# Patient Record
Sex: FEMALE | Race: WHITE
[De-identification: names, ages, dates, MRNs, and addresses within clinical notes are randomized per-mention and may not be internally consistent; named-entity substitution may affect disease eponyms.]

---

## 2018-02-19 ENCOUNTER — HOSPITAL ENCOUNTER (OUTPATIENT)
Age: 68
End: 2018-02-19
Payer: MEDICARE

## 2018-02-19 DIAGNOSIS — R91.8: Primary | ICD-10-CM

## 2018-02-19 DIAGNOSIS — Z87.891: ICD-10-CM

## 2018-02-19 DIAGNOSIS — C50.911: ICD-10-CM

## 2018-02-19 PROCEDURE — 77067 SCR MAMMO BI INCL CAD: CPT

## 2018-02-19 PROCEDURE — 77080 DXA BONE DENSITY AXIAL: CPT

## 2018-06-05 ENCOUNTER — HOSPITAL ENCOUNTER (OUTPATIENT)
Age: 68
End: 2018-06-05
Payer: MEDICARE

## 2018-06-05 DIAGNOSIS — F17.200: ICD-10-CM

## 2018-06-05 DIAGNOSIS — Z78.0: ICD-10-CM

## 2018-06-05 DIAGNOSIS — C50.911: Primary | ICD-10-CM

## 2018-06-05 DIAGNOSIS — R91.8: ICD-10-CM

## 2018-06-05 PROCEDURE — 71250 CT THORAX DX C-: CPT

## 2018-06-06 ENCOUNTER — HOSPITAL ENCOUNTER (OUTPATIENT)
Age: 68
End: 2018-06-06
Payer: MEDICARE

## 2018-06-06 DIAGNOSIS — Z79.810: ICD-10-CM

## 2018-06-06 DIAGNOSIS — Z51.81: ICD-10-CM

## 2018-06-06 DIAGNOSIS — R29.898: ICD-10-CM

## 2018-06-06 DIAGNOSIS — C50.911: Primary | ICD-10-CM

## 2018-06-06 DIAGNOSIS — M81.0: ICD-10-CM

## 2018-06-06 LAB
ALBUMIN LEVEL: 3.5 GM/DL (ref 3.4–5)
ALBUMIN/GLOB SERPL: 1.3 {RATIO} (ref 1.1–1.8)
ALP ISO SERPL-ACNC: 52 U/L (ref 46–116)
ALT SERPLBLD-CCNC: 31 U/L (ref 12–78)
ANION GAP SERPL CALC-SCNC: 11.1 MEQ/L (ref 5–15)
AST SERPL QL: 27 U/L (ref 15–37)
BILIRUBIN,TOTAL: 0.2 MG/DL (ref 0.2–1)
BUN SERPL-MCNC: 23 MG/DL (ref 7–18)
CALCIUM SPEC-MCNC: 8.5 MG/DL (ref 8.5–10.1)
CHLORIDE SPEC-SCNC: 105 MMOL/L (ref 98–107)
CO2 SERPL-SCNC: 31 MMOL/L (ref 21–32)
CREAT BLD-SCNC: 0.91 MG/DL (ref 0.55–1.02)
ESTIMATED GLOMERULAR FILT RATE: 62 ML/MIN (ref 60–?)
GFR (AFRICAN AMERICAN): 75 ML/MIN (ref 60–?)
GLOBULIN SER CALC-MCNC: 2.8 GM/DL (ref 1.3–3.2)
GLUCOSE: 92 MG/DL (ref 74–106)
HCT VFR BLD CALC: 38 % (ref 37–47)
HGB BLD-MCNC: 11.2 G/DL (ref 12.2–16.2)
MCHC RBC-ENTMCNC: 29.5 G/DL (ref 31.8–35.4)
MCV RBC: 103.7 FL (ref 81–99)
MEAN CORPUSCULAR HEMOGLOBIN: 30.6 PG (ref 27–31.2)
PLATELET # BLD: 223 K/MM3 (ref 142–424)
POTASSIUM: 4.1 MMOL/L (ref 3.5–5.1)
PROT SERPL-MCNC: 6.3 GM/DL (ref 6.4–8.2)
RBC # BLD AUTO: 3.66 M/MM3 (ref 4.2–5.4)
SODIUM SPEC-SCNC: 143 MMOL/L (ref 136–145)
WBC # BLD AUTO: 8.4 K/MM3 (ref 4.8–10.8)

## 2018-06-06 PROCEDURE — 80053 COMPREHEN METABOLIC PANEL: CPT

## 2018-06-06 PROCEDURE — 36415 COLL VENOUS BLD VENIPUNCTURE: CPT

## 2018-06-06 PROCEDURE — 85025 COMPLETE CBC W/AUTO DIFF WBC: CPT

## 2018-07-17 ENCOUNTER — HOSPITAL ENCOUNTER (OUTPATIENT)
Dept: HOSPITAL 22 - PT | Age: 68
Discharge: HOME | End: 2018-07-17
Payer: MEDICARE

## 2018-07-17 DIAGNOSIS — I89.0: Primary | ICD-10-CM

## 2018-07-17 PROCEDURE — 97162 PT EVAL MOD COMPLEX 30 MIN: CPT

## 2018-07-17 PROCEDURE — 97140 MANUAL THERAPY 1/> REGIONS: CPT

## 2018-07-17 PROCEDURE — 97760 ORTHOTIC MGMT&TRAING 1ST ENC: CPT

## 2018-10-29 ENCOUNTER — HOSPITAL ENCOUNTER (OUTPATIENT)
Age: 68
End: 2018-10-29
Payer: MEDICARE

## 2018-10-29 DIAGNOSIS — N89.8: ICD-10-CM

## 2018-10-29 DIAGNOSIS — R09.89: Primary | ICD-10-CM

## 2018-10-29 PROCEDURE — 76830 TRANSVAGINAL US NON-OB: CPT

## 2018-10-29 PROCEDURE — 93880 EXTRACRANIAL BILAT STUDY: CPT

## 2020-02-22 ENCOUNTER — HOSPITAL ENCOUNTER (OUTPATIENT)
Age: 70
End: 2020-02-22
Payer: MEDICARE

## 2020-02-22 DIAGNOSIS — D50.9: Primary | ICD-10-CM

## 2020-02-22 DIAGNOSIS — C50.111: ICD-10-CM

## 2020-02-22 PROCEDURE — 71046 X-RAY EXAM CHEST 2 VIEWS: CPT

## 2020-02-22 PROCEDURE — 36415 COLL VENOUS BLD VENIPUNCTURE: CPT

## 2020-02-22 PROCEDURE — 86850 RBC ANTIBODY SCREEN: CPT

## 2020-02-24 ENCOUNTER — HOSPITAL ENCOUNTER (OUTPATIENT)
Dept: HOSPITAL 22 - INF | Age: 70
Discharge: HOME | End: 2020-02-24
Payer: MEDICARE

## 2020-02-24 VITALS
TEMPERATURE: 98.24 F | SYSTOLIC BLOOD PRESSURE: 117 MMHG | DIASTOLIC BLOOD PRESSURE: 54 MMHG | RESPIRATION RATE: 20 BRPM | HEART RATE: 72 BPM | OXYGEN SATURATION: 97 %

## 2020-02-24 VITALS
HEART RATE: 71 BPM | DIASTOLIC BLOOD PRESSURE: 59 MMHG | RESPIRATION RATE: 18 BRPM | SYSTOLIC BLOOD PRESSURE: 110 MMHG | TEMPERATURE: 98.4 F | OXYGEN SATURATION: 96 %

## 2020-02-24 VITALS
TEMPERATURE: 98.3 F | RESPIRATION RATE: 16 BRPM | SYSTOLIC BLOOD PRESSURE: 114 MMHG | DIASTOLIC BLOOD PRESSURE: 52 MMHG | OXYGEN SATURATION: 97 % | HEART RATE: 72 BPM

## 2020-02-24 VITALS
SYSTOLIC BLOOD PRESSURE: 106 MMHG | RESPIRATION RATE: 18 BRPM | DIASTOLIC BLOOD PRESSURE: 54 MMHG | HEART RATE: 72 BPM | OXYGEN SATURATION: 97 % | TEMPERATURE: 98.24 F

## 2020-02-24 VITALS
SYSTOLIC BLOOD PRESSURE: 114 MMHG | RESPIRATION RATE: 18 BRPM | TEMPERATURE: 98.7 F | HEART RATE: 74 BPM | OXYGEN SATURATION: 94 % | DIASTOLIC BLOOD PRESSURE: 58 MMHG

## 2020-02-24 VITALS
RESPIRATION RATE: 20 BRPM | OXYGEN SATURATION: 94 % | TEMPERATURE: 98.24 F | HEART RATE: 78 BPM | DIASTOLIC BLOOD PRESSURE: 56 MMHG | SYSTOLIC BLOOD PRESSURE: 122 MMHG

## 2020-02-24 VITALS
HEART RATE: 76 BPM | TEMPERATURE: 98.4 F | DIASTOLIC BLOOD PRESSURE: 59 MMHG | SYSTOLIC BLOOD PRESSURE: 114 MMHG | OXYGEN SATURATION: 96 % | RESPIRATION RATE: 18 BRPM

## 2020-02-24 VITALS
DIASTOLIC BLOOD PRESSURE: 73 MMHG | OXYGEN SATURATION: 94 % | RESPIRATION RATE: 20 BRPM | HEART RATE: 75 BPM | SYSTOLIC BLOOD PRESSURE: 137 MMHG | TEMPERATURE: 98.9 F

## 2020-02-24 VITALS
DIASTOLIC BLOOD PRESSURE: 61 MMHG | HEART RATE: 71 BPM | RESPIRATION RATE: 18 BRPM | OXYGEN SATURATION: 99 % | SYSTOLIC BLOOD PRESSURE: 114 MMHG | TEMPERATURE: 98.4 F

## 2020-02-24 VITALS
HEART RATE: 75 BPM | OXYGEN SATURATION: 97 % | TEMPERATURE: 98.3 F | DIASTOLIC BLOOD PRESSURE: 52 MMHG | SYSTOLIC BLOOD PRESSURE: 111 MMHG | RESPIRATION RATE: 20 BRPM

## 2020-02-24 VITALS
HEART RATE: 72 BPM | DIASTOLIC BLOOD PRESSURE: 56 MMHG | OXYGEN SATURATION: 98 % | TEMPERATURE: 98.6 F | SYSTOLIC BLOOD PRESSURE: 109 MMHG | RESPIRATION RATE: 18 BRPM

## 2020-02-24 VITALS
RESPIRATION RATE: 16 BRPM | DIASTOLIC BLOOD PRESSURE: 50 MMHG | HEART RATE: 71 BPM | TEMPERATURE: 98.24 F | SYSTOLIC BLOOD PRESSURE: 118 MMHG | OXYGEN SATURATION: 98 %

## 2020-02-24 VITALS
SYSTOLIC BLOOD PRESSURE: 126 MMHG | RESPIRATION RATE: 18 BRPM | DIASTOLIC BLOOD PRESSURE: 53 MMHG | OXYGEN SATURATION: 94 % | HEART RATE: 73 BPM | TEMPERATURE: 98.6 F

## 2020-02-24 VITALS
RESPIRATION RATE: 18 BRPM | SYSTOLIC BLOOD PRESSURE: 112 MMHG | TEMPERATURE: 98.42 F | DIASTOLIC BLOOD PRESSURE: 51 MMHG | OXYGEN SATURATION: 98 % | HEART RATE: 71 BPM

## 2020-02-24 VITALS
TEMPERATURE: 98.5 F | SYSTOLIC BLOOD PRESSURE: 123 MMHG | HEART RATE: 77 BPM | DIASTOLIC BLOOD PRESSURE: 63 MMHG | RESPIRATION RATE: 18 BRPM | OXYGEN SATURATION: 93 %

## 2020-02-24 VITALS
SYSTOLIC BLOOD PRESSURE: 125 MMHG | TEMPERATURE: 98.24 F | DIASTOLIC BLOOD PRESSURE: 62 MMHG | HEART RATE: 73 BPM | OXYGEN SATURATION: 94 % | RESPIRATION RATE: 16 BRPM

## 2020-02-24 VITALS
RESPIRATION RATE: 16 BRPM | HEART RATE: 74 BPM | OXYGEN SATURATION: 94 % | SYSTOLIC BLOOD PRESSURE: 138 MMHG | TEMPERATURE: 98.3 F | DIASTOLIC BLOOD PRESSURE: 64 MMHG

## 2020-02-24 VITALS
HEART RATE: 73 BPM | RESPIRATION RATE: 18 BRPM | DIASTOLIC BLOOD PRESSURE: 60 MMHG | OXYGEN SATURATION: 97 % | SYSTOLIC BLOOD PRESSURE: 115 MMHG | TEMPERATURE: 98.24 F

## 2020-02-24 VITALS
TEMPERATURE: 98.24 F | RESPIRATION RATE: 20 BRPM | HEART RATE: 73 BPM | OXYGEN SATURATION: 97 % | SYSTOLIC BLOOD PRESSURE: 115 MMHG | DIASTOLIC BLOOD PRESSURE: 50 MMHG

## 2020-02-24 VITALS — BODY MASS INDEX: 21.2 KG/M2

## 2020-02-24 VITALS
RESPIRATION RATE: 18 BRPM | DIASTOLIC BLOOD PRESSURE: 59 MMHG | OXYGEN SATURATION: 98 % | HEART RATE: 72 BPM | TEMPERATURE: 98.4 F | SYSTOLIC BLOOD PRESSURE: 104 MMHG

## 2020-02-24 VITALS
RESPIRATION RATE: 16 BRPM | HEART RATE: 75 BPM | SYSTOLIC BLOOD PRESSURE: 105 MMHG | DIASTOLIC BLOOD PRESSURE: 51 MMHG | OXYGEN SATURATION: 94 % | TEMPERATURE: 98.5 F

## 2020-02-24 DIAGNOSIS — D50.8: Primary | ICD-10-CM

## 2020-02-24 LAB
HCT VFR BLD CALC: 29.9 % (ref 37–47)
HCT VFR BLD CALC: 35.9 % (ref 37–47)
HGB BLD-MCNC: 10.7 G/DL (ref 12.2–16.2)
HGB BLD-MCNC: 8.3 G/DL (ref 12.2–16.2)
MCHC RBC-ENTMCNC: 27.6 G/DL (ref 31.8–35.4)
MCV RBC: 90.9 FL (ref 81–99)
MEAN CORPUSCULAR HEMOGLOBIN: 25.1 PG (ref 27–31.2)
PLATELET # BLD: 440 K/MM3 (ref 142–424)
RBC # BLD AUTO: 3.29 M/MM3 (ref 4.2–5.4)
WBC # BLD AUTO: 7 K/MM3 (ref 4.8–10.8)

## 2020-02-24 PROCEDURE — 85014 HEMATOCRIT: CPT

## 2020-02-24 PROCEDURE — 85025 COMPLETE CBC W/AUTO DIFF WBC: CPT

## 2020-02-24 PROCEDURE — P9016 RBC LEUKOCYTES REDUCED: HCPCS

## 2020-02-24 PROCEDURE — 36430 TRANSFUSION BLD/BLD COMPNT: CPT

## 2020-02-24 PROCEDURE — 85018 HEMOGLOBIN: CPT

## 2020-02-24 PROCEDURE — 36415 COLL VENOUS BLD VENIPUNCTURE: CPT

## 2020-02-24 NOTE — PC.NURSE
1600  1st unit of blood complete. Pt has tolerated transfusion of 1st unit of blood well with no problems/no s/s reaction noted. VSS. Resp unlabored. O2 remains in use at 2lpm per NC, as pt is home O2 dependent at 2lpm per NC. Pt denies complaints

## 2020-02-24 NOTE — PC.NURSE
1345  1st unit of PRBC's initiated at this time. VSS. Lungs diminished throughout, pt with hx of severe COPD. O2 in use at 2lpm per nc consistent with pt baseline home O2 dependency. IV patent. Sister at bedside. Resp unlabored. Skin warm/dry to

## 2020-02-24 NOTE — PC.NURSE
1720  Pt tolerating 2nd unit of blood well. Denies c/o. VSS. Lungs consistent with baseline pre-transfusion assessment. Resp unlabored. ORe2 remains in use at 2lpm per nc.  Pt transported via wheelchair per [facility] and [facility] to room 210

## 2020-09-03 ENCOUNTER — TRANSCRIBE ORDERS (OUTPATIENT)
Dept: ADMINISTRATIVE | Facility: HOSPITAL | Age: 70
End: 2020-09-03

## 2020-09-03 DIAGNOSIS — Z12.31 VISIT FOR SCREENING MAMMOGRAM: Primary | ICD-10-CM

## 2020-10-05 ENCOUNTER — APPOINTMENT (OUTPATIENT)
Dept: OTHER | Facility: HOSPITAL | Age: 70
End: 2020-10-05

## 2020-10-05 ENCOUNTER — HOSPITAL ENCOUNTER (OUTPATIENT)
Dept: MAMMOGRAPHY | Facility: HOSPITAL | Age: 70
Discharge: HOME | End: 2020-10-05
Admitting: NURSE PRACTITIONER

## 2020-10-05 DIAGNOSIS — Z12.31 VISIT FOR SCREENING MAMMOGRAM: ICD-10-CM

## 2020-10-05 PROCEDURE — 77067 SCR MAMMO BI INCL CAD: CPT

## 2020-10-05 PROCEDURE — 77063 BREAST TOMOSYNTHESIS BI: CPT | Performed by: RADIOLOGY

## 2020-10-05 PROCEDURE — 77063 BREAST TOMOSYNTHESIS BI: CPT

## 2020-10-05 PROCEDURE — 77067 SCR MAMMO BI INCL CAD: CPT | Performed by: RADIOLOGY

## 2021-12-21 ENCOUNTER — TELEPHONE (OUTPATIENT)
Dept: ONCOLOGY | Facility: CLINIC | Age: 71
End: 2021-12-21

## 2021-12-21 NOTE — TELEPHONE ENCOUNTER
Caller: Sharmila Delarosa    Relationship to patient: Self    Best call back number: [phone]    Type of visit: NEW PATIENT    Requested date: PLEASE CALL TO R/S.    If rescheduling, when is the original appointment: 12/21    Additional notes: HUB UNABLE TO REACH WARM TRANSFER.

## 2022-01-01 ENCOUNTER — TRANSCRIBE ORDERS (OUTPATIENT)
Dept: ADMINISTRATIVE | Facility: HOSPITAL | Age: 72
End: 2022-01-01

## 2022-01-01 ENCOUNTER — TELEPHONE (OUTPATIENT)
Dept: ONCOLOGY | Facility: CLINIC | Age: 72
End: 2022-01-01

## 2022-01-01 ENCOUNTER — OFFICE VISIT (OUTPATIENT)
Dept: PULMONOLOGY | Facility: CLINIC | Age: 72
End: 2022-01-01

## 2022-01-01 ENCOUNTER — LAB (OUTPATIENT)
Dept: LAB | Facility: HOSPITAL | Age: 72
End: 2022-01-01

## 2022-01-01 ENCOUNTER — CONSULT (OUTPATIENT)
Dept: ONCOLOGY | Facility: CLINIC | Age: 72
End: 2022-01-01

## 2022-01-01 ENCOUNTER — HOSPITAL ENCOUNTER (OUTPATIENT)
Dept: MAMMOGRAPHY | Facility: HOSPITAL | Age: 72
Discharge: HOME | End: 2022-07-26
Admitting: FAMILY MEDICINE

## 2022-01-01 VITALS
TEMPERATURE: 97.1 F | OXYGEN SATURATION: 100 % | WEIGHT: 130.8 LBS | BODY MASS INDEX: 21.02 KG/M2 | DIASTOLIC BLOOD PRESSURE: 90 MMHG | HEART RATE: 84 BPM | SYSTOLIC BLOOD PRESSURE: 130 MMHG | HEIGHT: 66 IN

## 2022-01-01 VITALS
SYSTOLIC BLOOD PRESSURE: 124 MMHG | HEART RATE: 90 BPM | HEIGHT: 66 IN | BODY MASS INDEX: 21.21 KG/M2 | RESPIRATION RATE: 20 BRPM | WEIGHT: 132 LBS | DIASTOLIC BLOOD PRESSURE: 56 MMHG | TEMPERATURE: 97.3 F | OXYGEN SATURATION: 92 %

## 2022-01-01 DIAGNOSIS — Z00.6 EXAMINATION FOR NORMAL COMPARISON OR CONTROL IN CLINICAL RESEARCH: Primary | ICD-10-CM

## 2022-01-01 DIAGNOSIS — Z12.31 VISIT FOR SCREENING MAMMOGRAM: ICD-10-CM

## 2022-01-01 DIAGNOSIS — Z87.891 FORMER SMOKER: ICD-10-CM

## 2022-01-01 DIAGNOSIS — J44.9 STAGE 4 VERY SEVERE COPD BY GOLD CLASSIFICATION: ICD-10-CM

## 2022-01-01 DIAGNOSIS — D64.9 ANEMIA, UNSPECIFIED TYPE: ICD-10-CM

## 2022-01-01 DIAGNOSIS — J96.11 CHRONIC RESPIRATORY FAILURE WITH HYPOXIA: ICD-10-CM

## 2022-01-01 DIAGNOSIS — J47.9 BRONCHIECTASIS WITHOUT COMPLICATION: ICD-10-CM

## 2022-01-01 DIAGNOSIS — D64.9 ANEMIA, UNSPECIFIED TYPE: Primary | ICD-10-CM

## 2022-01-01 DIAGNOSIS — M81.0 OSTEOPOROSIS WITHOUT CURRENT PATHOLOGICAL FRACTURE, UNSPECIFIED OSTEOPOROSIS TYPE: ICD-10-CM

## 2022-01-01 DIAGNOSIS — R91.1 PULMONARY NODULE: ICD-10-CM

## 2022-01-01 DIAGNOSIS — R06.02 SOB (SHORTNESS OF BREATH): Primary | ICD-10-CM

## 2022-01-01 DIAGNOSIS — Z12.31 VISIT FOR SCREENING MAMMOGRAM: Primary | ICD-10-CM

## 2022-01-01 LAB
25(OH)D3 SERPL-MCNC: 48.2 NG/ML (ref 30–100)
ALBUMIN SERPL ELPH-MCNC: 3.5 G/DL (ref 2.9–4.4)
ALBUMIN SERPL-MCNC: 3.9 G/DL (ref 3.5–5.2)
ALBUMIN/GLOB SERPL: 1.1 {RATIO} (ref 0.7–1.7)
ALBUMIN/GLOB SERPL: 1.3 G/DL
ALP SERPL-CCNC: 57 U/L (ref 39–117)
ALPHA1 GLOB SERPL ELPH-MCNC: 0.2 G/DL (ref 0–0.4)
ALPHA2 GLOB SERPL ELPH-MCNC: 0.8 G/DL (ref 0.4–1)
ALT SERPL W P-5'-P-CCNC: 16 U/L (ref 1–33)
ANION GAP SERPL CALCULATED.3IONS-SCNC: 9 MMOL/L (ref 5–15)
AST SERPL-CCNC: 18 U/L (ref 1–32)
B-GLOBULIN SERPL ELPH-MCNC: 0.9 G/DL (ref 0.7–1.3)
BASOPHILS # BLD AUTO: 0.02 10*3/MM3 (ref 0–0.2)
BASOPHILS NFR BLD AUTO: 0.2 % (ref 0–1.5)
BILIRUB SERPL-MCNC: 0.2 MG/DL (ref 0–1.2)
BUN SERPL-MCNC: 21 MG/DL (ref 8–23)
BUN/CREAT SERPL: 22.1 (ref 7–25)
CALCIUM SPEC-SCNC: 10.3 MG/DL (ref 8.6–10.5)
CHLORIDE SERPL-SCNC: 99 MMOL/L (ref 98–107)
CO2 SERPL-SCNC: 37 MMOL/L (ref 22–29)
CREAT SERPL-MCNC: 0.95 MG/DL (ref 0.57–1)
DEPRECATED RDW RBC AUTO: 57.3 FL (ref 37–54)
EGFRCR SERPLBLD CKD-EPI 2021: 64.6 ML/MIN/1.73
EOSINOPHIL # BLD AUTO: 0.17 10*3/MM3 (ref 0–0.4)
EOSINOPHIL NFR BLD AUTO: 1.6 % (ref 0.3–6.2)
ERYTHROCYTE [DISTWIDTH] IN BLOOD BY AUTOMATED COUNT: 16.1 % (ref 12.3–15.4)
FERRITIN SERPL-MCNC: 106.1 NG/ML (ref 13–150)
FOLATE SERPL-MCNC: >20 NG/ML (ref 4.78–24.2)
GAMMA GLOB SERPL ELPH-MCNC: 1.2 G/DL (ref 0.4–1.8)
GLOBULIN SER CALC-MCNC: 3.2 G/DL (ref 2.2–3.9)
GLOBULIN UR ELPH-MCNC: 2.9 GM/DL
GLUCOSE SERPL-MCNC: 91 MG/DL (ref 65–99)
HAPTOGLOB SERPL-MCNC: 174 MG/DL (ref 30–200)
HCT VFR BLD AUTO: 35.7 % (ref 34–46.6)
HGB BLD-MCNC: 10 G/DL (ref 12–15.9)
IMM GRANULOCYTES # BLD AUTO: 0.02 10*3/MM3 (ref 0–0.05)
IMM GRANULOCYTES NFR BLD AUTO: 0.2 % (ref 0–0.5)
IRON 24H UR-MRATE: 26 MCG/DL (ref 37–145)
IRON SATN MFR SERPL: 8 % (ref 20–50)
LABORATORY COMMENT REPORT: NORMAL
LDH SERPL-CCNC: 166 U/L (ref 135–214)
LYMPHOCYTES # BLD AUTO: 1.91 10*3/MM3 (ref 0.7–3.1)
LYMPHOCYTES NFR BLD AUTO: 17.5 % (ref 19.6–45.3)
M PROTEIN SERPL ELPH-MCNC: NORMAL G/DL
MCH RBC QN AUTO: 27.3 PG (ref 26.6–33)
MCHC RBC AUTO-ENTMCNC: 28 G/DL (ref 31.5–35.7)
MCV RBC AUTO: 97.5 FL (ref 79–97)
MONOCYTES # BLD AUTO: 1.4 10*3/MM3 (ref 0.1–0.9)
MONOCYTES NFR BLD AUTO: 12.8 % (ref 5–12)
NEUTROPHILS NFR BLD AUTO: 67.7 % (ref 42.7–76)
NEUTROPHILS NFR BLD AUTO: 7.4 10*3/MM3 (ref 1.7–7)
PLATELET # BLD AUTO: 255 10*3/MM3 (ref 140–450)
PMV BLD AUTO: 8.2 FL (ref 6–12)
POTASSIUM SERPL-SCNC: 4.7 MMOL/L (ref 3.5–5.2)
PROT SERPL-MCNC: 6.7 G/DL (ref 6–8.5)
PROT SERPL-MCNC: 6.8 G/DL (ref 6–8.5)
RBC # BLD AUTO: 3.66 10*6/MM3 (ref 3.77–5.28)
RETICS # AUTO: 0.07 10*6/MM3 (ref 0.02–0.13)
RETICS/RBC NFR AUTO: 1.84 % (ref 0.7–1.9)
SODIUM SERPL-SCNC: 145 MMOL/L (ref 136–145)
TIBC SERPL-MCNC: 341 MCG/DL (ref 298–536)
TRANSFERRIN SERPL-MCNC: 229 MG/DL (ref 200–360)
VIT B12 BLD-MCNC: 455 PG/ML (ref 211–946)
WBC NRBC COR # BLD: 10.92 10*3/MM3 (ref 3.4–10.8)

## 2022-01-01 PROCEDURE — 36415 COLL VENOUS BLD VENIPUNCTURE: CPT

## 2022-01-01 PROCEDURE — 82728 ASSAY OF FERRITIN: CPT

## 2022-01-01 PROCEDURE — 82306 VITAMIN D 25 HYDROXY: CPT

## 2022-01-01 PROCEDURE — 99204 OFFICE O/P NEW MOD 45 MIN: CPT | Performed by: INTERNAL MEDICINE

## 2022-01-01 PROCEDURE — 77067 SCR MAMMO BI INCL CAD: CPT

## 2022-01-01 PROCEDURE — 94729 DIFFUSING CAPACITY: CPT | Performed by: INTERNAL MEDICINE

## 2022-01-01 PROCEDURE — 94726 PLETHYSMOGRAPHY LUNG VOLUMES: CPT | Performed by: INTERNAL MEDICINE

## 2022-01-01 PROCEDURE — 84466 ASSAY OF TRANSFERRIN: CPT

## 2022-01-01 PROCEDURE — 80053 COMPREHEN METABOLIC PANEL: CPT

## 2022-01-01 PROCEDURE — 77063 BREAST TOMOSYNTHESIS BI: CPT | Performed by: RADIOLOGY

## 2022-01-01 PROCEDURE — 83540 ASSAY OF IRON: CPT

## 2022-01-01 PROCEDURE — 83010 ASSAY OF HAPTOGLOBIN QUANT: CPT

## 2022-01-01 PROCEDURE — 77063 BREAST TOMOSYNTHESIS BI: CPT

## 2022-01-01 PROCEDURE — 94010 BREATHING CAPACITY TEST: CPT | Performed by: INTERNAL MEDICINE

## 2022-01-01 PROCEDURE — 85045 AUTOMATED RETICULOCYTE COUNT: CPT

## 2022-01-01 PROCEDURE — 83615 LACTATE (LD) (LDH) ENZYME: CPT

## 2022-01-01 PROCEDURE — 82746 ASSAY OF FOLIC ACID SERUM: CPT

## 2022-01-01 PROCEDURE — 77067 SCR MAMMO BI INCL CAD: CPT | Performed by: RADIOLOGY

## 2022-01-01 PROCEDURE — 84165 PROTEIN E-PHORESIS SERUM: CPT

## 2022-01-01 PROCEDURE — 85025 COMPLETE CBC W/AUTO DIFF WBC: CPT

## 2022-01-01 PROCEDURE — 99205 OFFICE O/P NEW HI 60 MIN: CPT | Performed by: INTERNAL MEDICINE

## 2022-01-01 PROCEDURE — 82607 VITAMIN B-12: CPT

## 2022-01-01 RX ORDER — FEXOFENADINE HYDROCHLORIDE AND PSEUDOEPHEDRINE HYDROCHLORIDE 60; 120 MG/1; MG/1
1 TABLET, FILM COATED, EXTENDED RELEASE ORAL DAILY PRN
COMMUNITY
Start: 2022-01-01

## 2022-01-01 RX ORDER — METOPROLOL SUCCINATE 50 MG/1
50 TABLET, EXTENDED RELEASE ORAL DAILY
COMMUNITY
Start: 2022-01-01

## 2022-01-01 RX ORDER — BUDESONIDE 0.5 MG/2ML
0.5 INHALANT ORAL 2 TIMES DAILY
Qty: 60 EACH | Refills: 5 | Status: SHIPPED | OUTPATIENT
Start: 2022-01-01 | End: 2023-01-01

## 2022-01-01 RX ORDER — REVEFENACIN 175 UG/3ML
175 SOLUTION RESPIRATORY (INHALATION) DAILY
Qty: 90 ML | Refills: 5 | Status: SHIPPED | OUTPATIENT
Start: 2022-01-01 | End: 2023-01-01

## 2022-01-01 RX ORDER — ALBUTEROL SULFATE 90 UG/1
4 AEROSOL, METERED RESPIRATORY (INHALATION) ONCE
Status: COMPLETED | OUTPATIENT
Start: 2022-01-01 | End: 2022-01-01

## 2022-01-01 RX ORDER — AZITHROMYCIN 250 MG/1
TABLET, FILM COATED ORAL
COMMUNITY
Start: 2022-01-01 | End: 2022-01-01

## 2022-01-01 RX ORDER — ALENDRONATE SODIUM 70 MG/1
70 TABLET ORAL
COMMUNITY
Start: 2022-01-01

## 2022-01-01 RX ORDER — SERTRALINE HYDROCHLORIDE 100 MG/1
100 TABLET, FILM COATED ORAL DAILY
COMMUNITY
Start: 2022-01-01

## 2022-01-01 RX ORDER — ESOMEPRAZOLE MAGNESIUM 40 MG/1
40 CAPSULE, DELAYED RELEASE ORAL DAILY
COMMUNITY
Start: 2022-01-01

## 2022-01-01 RX ORDER — HYDROXYCHLOROQUINE SULFATE 200 MG/1
200 TABLET, FILM COATED ORAL DAILY
COMMUNITY

## 2022-01-01 RX ORDER — FOLIC ACID 1 MG/1
1 TABLET ORAL DAILY
COMMUNITY
End: 2023-01-01

## 2022-01-01 RX ORDER — OXYBUTYNIN CHLORIDE 5 MG/1
5 TABLET ORAL 2 TIMES DAILY
COMMUNITY
Start: 2022-01-01

## 2022-01-01 RX ORDER — SODIUM, POTASSIUM,MAG SULFATES 17.5-3.13G
SOLUTION, RECONSTITUTED, ORAL ORAL
COMMUNITY
Start: 2022-01-01 | End: 2022-01-01

## 2022-01-01 RX ORDER — FORMOTEROL FUMARATE 20 UG/2ML
20 SOLUTION RESPIRATORY (INHALATION) 2 TIMES DAILY
Qty: 60 EACH | Refills: 5 | Status: SHIPPED | OUTPATIENT
Start: 2022-01-01 | End: 2023-01-01

## 2022-01-01 RX ORDER — PREDNISONE 1 MG/1
10 TABLET ORAL DAILY
COMMUNITY
Start: 2022-01-01 | End: 2022-01-01

## 2022-01-01 RX ORDER — MULTIPLE VITAMINS W/ MINERALS TAB 9MG-400MCG
1 TAB ORAL DAILY
COMMUNITY

## 2022-01-01 RX ORDER — TIOTROPIUM BROMIDE INHALATION SPRAY 3.12 UG/1
2 SPRAY, METERED RESPIRATORY (INHALATION)
COMMUNITY

## 2022-01-01 RX ORDER — PREDNISONE 10 MG/1
30 TABLET ORAL DAILY
COMMUNITY
Start: 2022-01-01

## 2022-01-01 RX ORDER — TAMOXIFEN CITRATE 20 MG/1
20 TABLET ORAL EVERY OTHER DAY
COMMUNITY

## 2022-01-01 RX ADMIN — ALBUTEROL SULFATE 4 PUFF: 90 AEROSOL, METERED RESPIRATORY (INHALATION) at 10:51

## 2022-07-29 NOTE — TELEPHONE ENCOUNTER
Caller: Sharmila Delarosa    Relationship: Self    Best call back number:[phone]    What was the call regarding: PATIENT HAD A REFERRAL TO SEE US BACK IN November 2021 AND HAD AN APPOINTMENT SCHEDULED BUT CANCELED AND NOW WOULD LIKE TO SCHEDULE.       Do you require a callback: YES

## 2022-09-08 NOTE — PROGRESS NOTES
Hematology and Oncology [place]  Office number [phone]    Fax number [phone]    New Patient Office Visit      Date: 2022     Patient Name: Sharmila Delarosa  MRN: [id number]  : 1950    Referring Physician: Dr. Gomez    Chief Complaint: iron deficiency anemia      History of Present Illness: Sharmila Delarosa is a pleasant 72 y.o. female who presents today for evaluation of iron deficiency anemia.  She also requests to establish care regarding history of right breast cancer.    According to her PCP notes, she has an intermittent anemia over the past 5 years.  She has had a history of remote blood transfusions, 2 days during chemotherapy. She is intolerant of oral iron which causes nausea and vomiting.  She does have chronic GERD but this is controlled with a PPI.  She has not noted any visible bleeding or melena.    She reports a history of right breast cancer treated at [facility] in [facility] in .  She has subsequently relocated to [place] and has not been followed by oncology in several years.  Her treatment records are not available to me at the time of our consultation.   However she reports that she underwent lumpectomy followed by chemotherapy, radiation therapy, and adjuvant tamoxifen.  She continues on adjuvant tamoxifen which she reports she is tolerating well.  She is up-to-date with her recent mammogram.  She is unaware of how long a course of adjuvant tamoxifen is planned.  She denies any current breast complaints such as swelling, palpable masses, nipple discharge, or skin change.  She has no personal history of VTE.  She is overdue for gynecologic exam, but reports no postmenopausal vaginal bleeding.  Her records have been requested from [facility] and are pending.    She has a 40-pack-year smoking history, but quit at age 66.  Most recent screening lung CT was in 2022 showing a 4 mm left lower lobe lung nodule,  reports continued imaging surveillance is planned.    Regarding colon cancer screening, she underwent Cologuard test earlier this year which was positive/abnormal.  She was scheduled for a colonoscopy 2 weeks ago with [facility], but this had to be delayed due to bronchitis and she has not yet rescheduled it, but is planning to assume that she has clearance.      Review of Systems:   I have reviewed the review of systems completed by the patient. This is negative for clinically significant symptoms except as noted below. This document has been scanned into the patient's chart.    Past Medical History:   Past Medical History:   Diagnosis Date   • Anxiety    • Breast cancer (HCC) 2015    Right breast    • Depression    • Drug therapy    • Hx of radiation therapy    COPD on 2L-3 with exertion  RA on 2 DMARDs as well as chronic prednisone.  Followed by Dr. Castellano.  Osteoporosis  No VTE, CVA, MI    Past Surgical History:   Past Surgical History:   Procedure Laterality Date   • BREAST BIOPSY Right 2015    X 3 BX's   • BREAST LUMPECTOMY         Family History:   Family History   Problem Relation Age of Onset   • Aortic stenosis Mother    • Melanoma Father    • Ovarian cancer Sister    • Breast cancer Sister    • Rectal cancer Sister    • Breast cancer Maternal Grandmother    • Breast cancer Daughter    Her daughter had a bilateral mastectomy at age 39 and is doing well.  She believes her daughter and herself have undergone genetic testing, those records are pending.  Sister with thalassemia.      Social History:   Social History     Socioeconomic History   • Marital status:    Tobacco Use   • Smoking status: Former Smoker   • Smokeless tobacco: Never Used   Substance and Sexual Activity   • Alcohol use: Never   • Drug use: Never       Medications:     Current Outpatient Medications:   •  alendronate (FOSAMAX) 70 MG tablet, TAKE 1 TABLET BY MOUTH ONE TIME A WEEK, Disp: ,  "Rfl:   •  Allegra-D Allergy & Congestion  MG per 12 hr tablet, Take 1 tablet by mouth Daily As Needed., Disp: , Rfl:   •  ascorbic acid (VITAMIN C) 100 MG tablet, Take 100 mg by mouth Daily., Disp: , Rfl:   •  calcium carbonate-cholecalciferol 500-400 MG-UNIT tablet tablet, Take 1 tablet by mouth Daily., Disp: , Rfl:   •  FERROUS GLUCONATE IRON PO, Take 225 mg by mouth 2 (Two) Times a Day., Disp: , Rfl:   •  folic acid (FOLVITE) 1 MG tablet, Take 1 mg by mouth Daily., Disp: , Rfl:   •  hydroxychloroquine (PLAQUENIL) 200 MG tablet, Take 200 mg by mouth Daily., Disp: , Rfl:   •  methotrexate 2.5 MG tablet, Take 2.5 mg by mouth 1 (One) Time Per Week., Disp: , Rfl:   •  multivitamin with minerals (MULTIVITAMIN ADULT PO), Take 1 tablet by mouth Daily., Disp: , Rfl:   •  predniSONE (DELTASONE) 10 MG tablet, Take 10 mg by mouth Daily., Disp: , Rfl:   •  sertraline (ZOLOFT) 100 MG tablet, Take 100 mg by mouth Daily., Disp: , Rfl:   •  tamoxifen (NOLVADEX) 20 MG chemo tablet, Take  by mouth Daily., Disp: , Rfl:   •  tiotropium bromide monohydrate (Spiriva Respimat) 2.5 MCG/ACT aerosol solution inhaler, Inhale 2 puffs Daily., Disp: , Rfl:   •  esomeprazole (nexIUM) 40 MG capsule, Take 40 mg by mouth Daily., Disp: , Rfl:   •  metoprolol succinate XL (TOPROL-XL) 50 MG 24 hr tablet, Take 50 mg by mouth Daily., Disp: , Rfl:   •  oxybutynin (DITROPAN) 5 MG tablet, Take 5 mg by mouth 2 (Two) Times a Day., Disp: , Rfl:     Allergies:   Not on File    Objective     Vital Signs:   Vitals:    09/08/22 1254   BP: 124/56   Pulse: 90   Resp: 20   Temp: 97.3 °F (36.3 °C)   TempSrc: Temporal   SpO2: 92%  Comment: 3L O2   Weight: 59.9 kg (132 lb)   Height: 167.6 cm (66\")   PainSc: 7  Comment: ARTHRITIS PAIN    Body mass index is 21.31 kg/m².   Pain Score    09/08/22 1254   PainSc: 7  Comment: ARTHRITIS PAIN       Physical Exam:   General: No acute distress on supplemental O2. Well appearing.  Examined in her wheelchair  HEENT: " Normocephalic, atraumatic. Sclera anicteric. Masked.  Neck: supple, no adenopathy.   Cardiovascular: regular rate and rhythm,. No murmurs.   Respiratory: Normal rate. Clear to auscultation bilaterally.  Abdomen: Soft, nontender, non distended with normoactive bowel sounds. No hepatosplenomegaly  Lymph: no cervical, supraclavicular or axillary adenopathy.  Neuro: Alert and oriented x 3. No focal deficits.   Ext: Symmetric, no swelling.   Psych: Euthymic      Laboratory/Imaging Reviewed:   CT of the chest showed 4 mm left lower lobe nodule.  Outside labs are notable for a CBC on 8/25/2022 which shows a white blood cell count of 10.1; hemoglobin 10.6; platelet count 394; MCV 92   ESR elevated at 42    CBC from 5/2022, notably was obtained at the same time he was symptomatic from swab and demonstrated a mild elevation in her white blood cell count at 11.4; hemoglobin of 9.1; MCV of 96; platelet count of 400.  Her iron saturation was noted to be low at 14% with a normal TIBC at 251 and a mild reduction in serum iron at 36.    Assessment / Plan      Assessment/Plan:   Diagnoses and all orders for this visit:    1. Anemia, unspecified type  -This may be at least partially explained by iron deficiency, but her iron studies could also be consistent with anemia of chronic disease.  Regardless, in the context of positive Cologuard I have recommended that she undergo GI work-up as is currently planned.  She is currently planned for colonoscopy, and this is pending scheduling, but if she has confirmed iron deficiency anemia I also would suggest an upper endoscopy at the same time.  Normal MCV argues against a pure iron deficiency anemia.  I will send a more comprehensive anemia profile including the following.  She will meet back with me with results.  -     Ferritin; Future  -     Folate; Future  -     Haptoglobin; Future  -     Lactate Dehydrogenase; Future  -     Protein Electrophoresis, Total; Future  -     Vitamin B12;  Future  -     Iron Profile; Future  -     Reticulocytes; Future  -     CBC & Differential; Future  -     Comprehensive Metabolic Panel;     2.  History of right breast cancer  -Currently on tamoxifen.  We reviewed side effects and schedule including general duration of 5 to 10 years.  I have recommended her outside oncology records and told her that we will further address her breast cancer history as well as treatment recommendations after I been able to obtain and review these.  We did review tamoxifen side effects including the risk for venous thromboembolism and endometrial hyperplasia/malignancies.  She should have regular gynecology exams and seek attention for any abnormal vaginal bleeding. DVT precautions reviewed. The medication should be held for elective surgeries.  She was encouraged to remain well hydrated and ambulate frequently in high risk travel situations.     3.  Extensive family history of malignancy  -She believes she has had prior genetic testing.  Will await records from outside oncologist and to readdress this on her follow-up if not previously performed.       Follow Up:   No follow-ups on file.     Christine Granger MD  Hematology and Oncology     Time spent on the day of service was 45 minutes inclusive of time before, during, and after office visit on record review, medically appropriate history and physical, counseling patient, ordering tests, documenting in the medical record..

## 2022-09-15 PROBLEM — J44.9 STAGE 4 VERY SEVERE COPD BY GOLD CLASSIFICATION (HCC): Status: ACTIVE | Noted: 2022-01-01

## 2022-09-15 PROBLEM — J96.11 CHRONIC RESPIRATORY FAILURE WITH HYPOXIA (HCC): Status: ACTIVE | Noted: 2022-01-01

## 2022-09-15 PROBLEM — R91.1 PULMONARY NODULE: Status: ACTIVE | Noted: 2022-01-01

## 2022-09-15 PROBLEM — J47.9 BRONCHIECTASIS WITHOUT COMPLICATION: Status: ACTIVE | Noted: 2022-01-01

## 2022-09-15 PROBLEM — Z87.891 FORMER SMOKER: Status: ACTIVE | Noted: 2022-01-01

## 2022-09-15 NOTE — PROGRESS NOTES
Initial Pulmonary Consult Note    Subjective   Reason for consultation/Chief Complaint: Shortness of Breath    Sharmila Delarosa is a 72 y.o. female is being seen for consultation today at the request of Bala Gomez MD    History of Present Illness    Ms. Delarosa is a 71yo F who is referred for emphysema. She has a previous history of breast cancer. She has chronic respiratory failure and has been on 2L home O2 for many years.     She was seen by her pcp at the end of August and treated for an acute exacerbation with antibiotics and steroids.     She presents to clinic today for further evaluation. She was first diagnosed with COPD in approximately 2011. She followed with Dr. Lyles at [facility].     She is currently on Spiriva. She has tried samples of Trelegy without much improvement. She is also on Prednisone 10mg daily for COPD and RA. She usually has at least 2 exacerbations per year but feels that this past year has been worse.     She has a history of breast cancer and did receive radiation to her right chest.     Active Ambulatory Problems     Diagnosis Date Noted   • Stage 4 very severe COPD by GOLD classification ([facility]) 09/15/2022   • Bronchiectasis without complication ([facility]) 09/15/2022   • Chronic respiratory failure with hypoxia ([facility]) 09/15/2022   • Pulmonary nodule 09/15/2022   • Former smoker 09/15/2022     Resolved Ambulatory Problems     Diagnosis Date Noted   • No Resolved Ambulatory Problems     Past Medical History:   Diagnosis Date   • Anxiety    • Breast cancer ([facility]) 2015   • Depression    • Drug therapy    • Hx of radiation therapy        Past Surgical History:   Procedure Laterality Date   • BREAST BIOPSY Right 2015    X 3 BX's   • BREAST LUMPECTOMY         Family History   Problem Relation Age of Onset   • Aortic stenosis Mother    • Melanoma Father    • Ovarian cancer Sister    • Breast cancer Sister    • Rectal cancer Sister    • Breast cancer Maternal  Grandmother    • Breast cancer Daughter        Social History     Socioeconomic History   • Marital status:    Tobacco Use   • Smoking status: Former Smoker     Packs/day: 1.00     Years: 40.00     Pack years: 40.00     Quit date:      Years since quittin.7   • Smokeless tobacco: Never Used   Vaping Use   • Vaping Use: Never used   Substance and Sexual Activity   • Alcohol use: Never   • Drug use: Never   • Sexual activity: Defer       Allergies   Allergen Reactions   • Cephalosporins Unknown - Low Severity       Current Outpatient Medications   Medication Sig Dispense Refill   • alendronate (FOSAMAX) 70 MG tablet TAKE 1 TABLET BY MOUTH ONE TIME A WEEK     • Allegra-D Allergy & Congestion  MG per 12 hr tablet Take 1 tablet by mouth Daily As Needed.     • ascorbic acid (VITAMIN C) 100 MG tablet Take 100 mg by mouth Daily.     • calcium carbonate-cholecalciferol 500-400 MG-UNIT tablet tablet Take 1 tablet by mouth Daily.     • FERROUS GLUCONATE IRON PO Take 225 mg by mouth 2 (Two) Times a Day.     • folic acid (FOLVITE) 1 MG tablet Take 1 mg by mouth Daily.     • hydroxychloroquine (PLAQUENIL) 200 MG tablet Take 200 mg by mouth Daily.     • methotrexate 2.5 MG tablet Take 2.5 mg by mouth 1 (One) Time Per Week.     • multivitamin with minerals tablet tablet Take 1 tablet by mouth Daily.     • predniSONE (DELTASONE) 10 MG tablet Take 10 mg by mouth Daily.     • sertraline (ZOLOFT) 100 MG tablet Take 100 mg by mouth Daily.     • tamoxifen (NOLVADEX) 20 MG chemo tablet Take  by mouth Daily.     • tiotropium bromide monohydrate (Spiriva Respimat) 2.5 MCG/ACT aerosol solution inhaler Inhale 2 puffs Daily.     • esomeprazole (nexIUM) 40 MG capsule Take 40 mg by mouth Daily.     • metoprolol succinate XL (TOPROL-XL) 50 MG 24 hr tablet Take 50 mg by mouth Daily.     • oxybutynin (DITROPAN) 5 MG tablet Take 5 mg by mouth 2 (Two) Times a Day.       No current facility-administered medications for this  "visit.       Review of Systems   HENT: Positive for congestion, dental problem and rhinorrhea.    Eyes: Positive for photophobia.   Respiratory: Positive for cough, choking and shortness of breath.    Cardiovascular: Negative.    Gastrointestinal: Positive for diarrhea.   Endocrine: Positive for heat intolerance.   Genitourinary: Negative.    Musculoskeletal: Positive for arthralgias, gait problem and myalgias.   Skin: Negative.    Allergic/Immunologic: Negative.    Neurological: Positive for weakness and headaches.   Hematological: Bruises/bleeds easily.   Psychiatric/Behavioral: Positive for confusion and dysphoric mood. The patient is nervous/anxious.          Objective   Blood pressure 130/90, pulse 84, temperature 97.1 °F (36.2 °C), height 167.6 cm (66\"), weight 59.3 kg (130 lb 12.8 oz), SpO2 100 %.  Physical Exam  Vitals and nursing note reviewed.   Constitutional:       General: She is not in acute distress.     Appearance: She is well-developed.   HENT:      Head: Normocephalic and atraumatic.   Eyes:      General: No scleral icterus.     Conjunctiva/sclera: Conjunctivae normal.      Pupils: Pupils are equal, round, and reactive to light.   Neck:      Thyroid: No thyromegaly.      Trachea: No tracheal deviation.   Cardiovascular:      Rate and Rhythm: Normal rate and regular rhythm.      Heart sounds: Normal heart sounds.   Pulmonary:      Effort: Pulmonary effort is normal. No respiratory distress.      Breath sounds: Decreased breath sounds present. No wheezing.   Abdominal:      General: Bowel sounds are normal.      Palpations: Abdomen is soft.      Tenderness: There is no abdominal tenderness.   Musculoskeletal:         General: Normal range of motion.      Cervical back: Normal range of motion and neck supple.   Lymphadenopathy:      Cervical: No cervical adenopathy.   Skin:     General: Skin is warm and dry.      Findings: No erythema or rash.   Neurological:      Mental Status: She is alert and " oriented to person, place, and time.      Motor: No abnormal muscle tone.      Coordination: Coordination normal.   Psychiatric:         Speech: Speech normal.         Behavior: Behavior normal.         Judgment: Judgment normal.         PFTs:  Performed in clinic and personally reviewed.   There is very severe airway obstruction that improves to severe obstruction with bronchodilators.   Significant bronchodilator response  There is no restriction. There is severe air trapping and hyperinflation.   The DLCO is severely reduced.     Imaging:  CT Chest from 8/18/22 reviewed. The lungs are hyperinflated. There is bronchiectasis of the right middle and right lower lobes. There is a 4mm nodule in the left lower lobe. There is debris noted within the bronchi.     Assessment & Plan   Diagnoses and all orders for this visit:    1. SOB (shortness of breath) (Primary)  -     albuterol sulfate HFA (PROVENTIL HFA;VENTOLIN HFA;PROAIR HFA) inhaler 4 puff  -     Pulmonary Function Test    2. Stage 4 very severe COPD by GOLD classification (HCC)    3. Bronchiectasis without complication (HCC)    4. Chronic respiratory failure with hypoxia (HCC)    5. Pulmonary nodule    6. Former smoker        Discussion:  Ms. Delarosa is a 73yo F who is referred for further management of emphysema.    1. COPD  - She has Stage III/IV disease based on her PFTs.  - She likely does not have enough forced vital capacity for inhalers and I would like to switch her over to a nebulized regimen instead.   - We will order her a home nebulizer.   - Start Yupelri nebs once daily. I have given her samples in case we need to obtain a PA.   - Start Brovana and Pulmicort nebs twice daily.   - Continue PRN Albuterol.  - Continue Spiriva until she gets her nebulizer and medications.   - Check Alpha-1 kit  - Continue Prednisone 10mg daily.     2. Bronchiectasis  - Noted on CT chest in the right middle and right lower lobes with debris noted in the airways.   -  Presumed secondary to RA but may also be secondary to history of right chest radiation.  - Nebulized regimen as above.   - I have given her an incentive spirometer to use after her nebulizers.   - She may need a flutter valve.     3. Chronic Respiratory Failure  - Continue home O2 at 2L at rest and 3L with exertion.     4. Left Lower Lobe Pulmonary Nodule  - Noted on CT chest from 8/18/22.   - This does not require any specific follow up.  - Continue yearly low-dose CT chest for lung cancer screening. She will be due again in August 2023.     Follow up in 3 months. Call with any questions.      I have spent 65 minutes reviewing the patient record, face to face with the patient and her son in discussion of test results and plans for further management and in documentation and coordination of care.       Irena Ayala, DO  Pulmonary and Critical Care Medicine  Note Electronically Signed

## 2023-01-01 ENCOUNTER — APPOINTMENT (OUTPATIENT)
Dept: GENERAL RADIOLOGY | Facility: HOSPITAL | Age: 73
DRG: 871 | End: 2023-01-01
Payer: MEDICARE

## 2023-01-01 ENCOUNTER — HOSPITAL ENCOUNTER (INPATIENT)
Facility: HOSPITAL | Age: 73
LOS: 1 days | DRG: 951 | End: 2023-04-01
Attending: INTERNAL MEDICINE | Admitting: INTERNAL MEDICINE
Payer: COMMERCIAL

## 2023-01-01 ENCOUNTER — HOSPITAL ENCOUNTER (INPATIENT)
Facility: HOSPITAL | Age: 73
LOS: 9 days | Discharge: HOSPICE | DRG: 871 | End: 2023-03-31
Attending: EMERGENCY MEDICINE | Admitting: HOSPITALIST
Payer: MEDICARE

## 2023-01-01 ENCOUNTER — ANCILLARY PROCEDURE (OUTPATIENT)
Dept: SPEECH THERAPY | Facility: HOSPITAL | Age: 73
DRG: 871 | End: 2023-01-01
Payer: MEDICARE

## 2023-01-01 ENCOUNTER — APPOINTMENT (OUTPATIENT)
Dept: CARDIOLOGY | Facility: HOSPITAL | Age: 73
DRG: 871 | End: 2023-01-01
Payer: MEDICARE

## 2023-01-01 ENCOUNTER — APPOINTMENT (OUTPATIENT)
Dept: CT IMAGING | Facility: HOSPITAL | Age: 73
DRG: 871 | End: 2023-01-01
Payer: MEDICARE

## 2023-01-01 VITALS
WEIGHT: 148 LBS | DIASTOLIC BLOOD PRESSURE: 63 MMHG | OXYGEN SATURATION: 97 % | RESPIRATION RATE: 26 BRPM | TEMPERATURE: 97.8 F | BODY MASS INDEX: 23.78 KG/M2 | HEART RATE: 115 BPM | SYSTOLIC BLOOD PRESSURE: 109 MMHG | HEIGHT: 66 IN

## 2023-01-01 VITALS
RESPIRATION RATE: 14 BRPM | DIASTOLIC BLOOD PRESSURE: 61 MMHG | TEMPERATURE: 96.3 F | SYSTOLIC BLOOD PRESSURE: 127 MMHG | OXYGEN SATURATION: 91 % | HEART RATE: 75 BPM

## 2023-01-01 DIAGNOSIS — J96.21 ACUTE ON CHRONIC RESPIRATORY FAILURE WITH HYPOXIA AND HYPERCAPNIA: ICD-10-CM

## 2023-01-01 DIAGNOSIS — R13.12 OROPHARYNGEAL DYSPHAGIA: ICD-10-CM

## 2023-01-01 DIAGNOSIS — J96.02 ACUTE RESPIRATORY FAILURE WITH HYPOXIA AND HYPERCARBIA: Primary | ICD-10-CM

## 2023-01-01 DIAGNOSIS — R09.02 HYPOXIA: ICD-10-CM

## 2023-01-01 DIAGNOSIS — J44.9 STAGE 4 VERY SEVERE COPD BY GOLD CLASSIFICATION: Chronic | ICD-10-CM

## 2023-01-01 DIAGNOSIS — R53.83 LETHARGY: ICD-10-CM

## 2023-01-01 DIAGNOSIS — J96.22 ACUTE ON CHRONIC RESPIRATORY FAILURE WITH HYPOXIA AND HYPERCAPNIA: ICD-10-CM

## 2023-01-01 DIAGNOSIS — J96.01 ACUTE RESPIRATORY FAILURE WITH HYPOXIA AND HYPERCARBIA: Primary | ICD-10-CM

## 2023-01-01 LAB
ABO GROUP BLD: NORMAL
ABO GROUP BLD: NORMAL
ALBUMIN SERPL-MCNC: 2.9 G/DL (ref 3.5–5.2)
ALBUMIN SERPL-MCNC: 3 G/DL (ref 3.5–5.2)
ALBUMIN SERPL-MCNC: 3.2 G/DL (ref 3.5–5.2)
ALBUMIN/GLOB SERPL: 0.9 G/DL
ALBUMIN/GLOB SERPL: 0.9 G/DL
ALBUMIN/GLOB SERPL: 1.3 G/DL
ALP SERPL-CCNC: 43 U/L (ref 39–117)
ALP SERPL-CCNC: 57 U/L (ref 39–117)
ALP SERPL-CCNC: 80 U/L (ref 39–117)
ALT SERPL W P-5'-P-CCNC: 15 U/L (ref 1–33)
ALT SERPL W P-5'-P-CCNC: 16 U/L (ref 1–33)
ALT SERPL W P-5'-P-CCNC: 9 U/L (ref 1–33)
ANION GAP SERPL CALCULATED.3IONS-SCNC: 11 MMOL/L (ref 5–15)
ANION GAP SERPL CALCULATED.3IONS-SCNC: 3 MMOL/L (ref 5–15)
ANION GAP SERPL CALCULATED.3IONS-SCNC: 4 MMOL/L (ref 5–15)
ANION GAP SERPL CALCULATED.3IONS-SCNC: 5 MMOL/L (ref 5–15)
ANION GAP SERPL CALCULATED.3IONS-SCNC: 6 MMOL/L (ref 5–15)
ANION GAP SERPL CALCULATED.3IONS-SCNC: 9 MMOL/L (ref 5–15)
ANION GAP SERPL CALCULATED.3IONS-SCNC: ABNORMAL MMOL/L
ANISOCYTOSIS BLD QL: NORMAL
ANISOCYTOSIS BLD QL: NORMAL
ARTERIAL PATENCY WRIST A: ABNORMAL
ARTERIAL PATENCY WRIST A: NORMAL
ASCENDING AORTA: 3.3 CM
AST SERPL-CCNC: 12 U/L (ref 1–32)
AST SERPL-CCNC: 17 U/L (ref 1–32)
AST SERPL-CCNC: 29 U/L (ref 1–32)
ATMOSPHERIC PRESS: ABNORMAL MM[HG]
ATMOSPHERIC PRESS: NORMAL MM[HG]
B PARAPERT DNA SPEC QL NAA+PROBE: NOT DETECTED
B PERT DNA SPEC QL NAA+PROBE: NOT DETECTED
BACTERIA SPEC AEROBE CULT: NORMAL
BACTERIA SPEC AEROBE CULT: NORMAL
BACTERIA UR QL AUTO: ABNORMAL /HPF
BASE EXCESS BLDA CALC-SCNC: 14.9 MMOL/L (ref 0–2)
BASE EXCESS BLDA CALC-SCNC: 15.6 MMOL/L (ref 0–2)
BASE EXCESS BLDA CALC-SCNC: 16.3 MMOL/L (ref 0–2)
BASE EXCESS BLDA CALC-SCNC: 26.1 MMOL/L (ref 0–2)
BASE EXCESS BLDA CALC-SCNC: NORMAL MMOL/L
BASOPHILS # BLD AUTO: 0.01 10*3/MM3 (ref 0–0.2)
BASOPHILS # BLD AUTO: 0.02 10*3/MM3 (ref 0–0.2)
BASOPHILS # BLD AUTO: 0.03 10*3/MM3 (ref 0–0.2)
BASOPHILS # BLD AUTO: 0.06 10*3/MM3 (ref 0–0.2)
BASOPHILS NFR BLD AUTO: 0.1 % (ref 0–1.5)
BASOPHILS NFR BLD AUTO: 0.1 % (ref 0–1.5)
BASOPHILS NFR BLD AUTO: 0.2 % (ref 0–1.5)
BASOPHILS NFR BLD AUTO: 0.2 % (ref 0–1.5)
BDY SITE: ABNORMAL
BDY SITE: NORMAL
BH BB BLOOD EXPIRATION DATE: NORMAL
BH BB BLOOD EXPIRATION DATE: NORMAL
BH BB BLOOD TYPE BARCODE: 6200
BH BB BLOOD TYPE BARCODE: 6200
BH BB DISPENSE STATUS: NORMAL
BH BB DISPENSE STATUS: NORMAL
BH BB PRODUCT CODE: NORMAL
BH BB PRODUCT CODE: NORMAL
BH BB UNIT NUMBER: NORMAL
BH BB UNIT NUMBER: NORMAL
BH CV ECHO MEAS - AO MAX PG: 27.2 MMHG
BH CV ECHO MEAS - AO MEAN PG: 12.2 MMHG
BH CV ECHO MEAS - AO ROOT DIAM: 3.6 CM
BH CV ECHO MEAS - AO V2 MAX: 260.6 CM/SEC
BH CV ECHO MEAS - AO V2 VTI: 46.2 CM
BH CV ECHO MEAS - AVA(I,D): 0.79 CM2
BH CV ECHO MEAS - EDV(CUBED): 118.1 ML
BH CV ECHO MEAS - EDV(MOD-SP2): 75 ML
BH CV ECHO MEAS - EDV(MOD-SP4): 92.8 ML
BH CV ECHO MEAS - EF(MOD-SP2): 46.9 %
BH CV ECHO MEAS - EF(MOD-SP4): 44.5 %
BH CV ECHO MEAS - ESV(CUBED): 35.9 ML
BH CV ECHO MEAS - ESV(MOD-SP2): 39.8 ML
BH CV ECHO MEAS - ESV(MOD-SP4): 51.5 ML
BH CV ECHO MEAS - FS: 32.7 %
BH CV ECHO MEAS - IVS/LVPW: 0.76 CM
BH CV ECHO MEAS - IVSD: 0.72 CM
BH CV ECHO MEAS - LA DIMENSION: 2.6 CM
BH CV ECHO MEAS - LAT PEAK E' VEL: 4.7 CM/SEC
BH CV ECHO MEAS - LV DIASTOLIC VOL/BSA (35-75): 55.5 CM2
BH CV ECHO MEAS - LV MASS(C)D: 138.3 GRAMS
BH CV ECHO MEAS - LV MAX PG: 1.5 MMHG
BH CV ECHO MEAS - LV MEAN PG: 0.62 MMHG
BH CV ECHO MEAS - LV SYSTOLIC VOL/BSA (12-30): 30.8 CM2
BH CV ECHO MEAS - LV V1 MAX: 61.2 CM/SEC
BH CV ECHO MEAS - LV V1 VTI: 12.7 CM
BH CV ECHO MEAS - LVIDD: 4.9 CM
BH CV ECHO MEAS - LVIDS: 3.3 CM
BH CV ECHO MEAS - LVOT AREA: 2.9 CM2
BH CV ECHO MEAS - LVOT DIAM: 1.92 CM
BH CV ECHO MEAS - LVPWD: 0.94 CM
BH CV ECHO MEAS - MED PEAK E' VEL: 10.2 CM/SEC
BH CV ECHO MEAS - MV A MAX VEL: 100.1 CM/SEC
BH CV ECHO MEAS - MV DEC SLOPE: 412.6 CM/SEC2
BH CV ECHO MEAS - MV DEC TIME: 0.26 MSEC
BH CV ECHO MEAS - MV E MAX VEL: 92 CM/SEC
BH CV ECHO MEAS - MV E/A: 0.92
BH CV ECHO MEAS - MV MAX PG: 5.3 MMHG
BH CV ECHO MEAS - MV MEAN PG: 3.2 MMHG
BH CV ECHO MEAS - MV P1/2T: 72.8 MSEC
BH CV ECHO MEAS - MV V2 VTI: 21.8 CM
BH CV ECHO MEAS - MVA(P1/2T): 3 CM2
BH CV ECHO MEAS - MVA(VTI): 1.68 CM2
BH CV ECHO MEAS - PA ACC TIME: 0.19 SEC
BH CV ECHO MEAS - PA PR(ACCEL): -6.6 MMHG
BH CV ECHO MEAS - PA V2 MAX: 129 CM/SEC
BH CV ECHO MEAS - RAP SYSTOLE: 8 MMHG
BH CV ECHO MEAS - RVSP: 56.6 MMHG
BH CV ECHO MEAS - SI(MOD-SP2): 21.1 ML/M2
BH CV ECHO MEAS - SI(MOD-SP4): 24.7 ML/M2
BH CV ECHO MEAS - SV(LVOT): 36.7 ML
BH CV ECHO MEAS - SV(MOD-SP2): 35.2 ML
BH CV ECHO MEAS - SV(MOD-SP4): 41.3 ML
BH CV ECHO MEAS - TAPSE (>1.6): 1.94 CM
BH CV ECHO MEAS - TR MAX PG: 48.6 MMHG
BH CV ECHO MEAS - TR MAX VEL: 348.4 CM/SEC
BH CV ECHO MEASUREMENTS AVERAGE E/E' RATIO: 12.35
BH CV VAS BP RIGHT ARM: NORMAL MMHG
BH CV XLRA - RV BASE: 3.8 CM
BH CV XLRA - RV LENGTH: 6.5 CM
BH CV XLRA - RV MID: 4.2 CM
BH CV XLRA - TDI S': 9 CM/SEC
BILIRUB SERPL-MCNC: 0.2 MG/DL (ref 0–1.2)
BILIRUB SERPL-MCNC: 0.3 MG/DL (ref 0–1.2)
BILIRUB SERPL-MCNC: 0.7 MG/DL (ref 0–1.2)
BILIRUB UR QL STRIP: NEGATIVE
BLD GP AB SCN SERPL QL: NEGATIVE
BODY TEMPERATURE: 37 C
BODY TEMPERATURE: NORMAL
BUN SERPL-MCNC: 24 MG/DL (ref 8–23)
BUN SERPL-MCNC: 24 MG/DL (ref 8–23)
BUN SERPL-MCNC: 34 MG/DL (ref 8–23)
BUN SERPL-MCNC: 35 MG/DL (ref 8–23)
BUN SERPL-MCNC: 36 MG/DL (ref 8–23)
BUN SERPL-MCNC: 38 MG/DL (ref 8–23)
BUN SERPL-MCNC: 40 MG/DL (ref 8–23)
BUN SERPL-MCNC: 40 MG/DL (ref 8–23)
BUN SERPL-MCNC: 42 MG/DL (ref 8–23)
BUN/CREAT SERPL: 19 (ref 7–25)
BUN/CREAT SERPL: 19.5 (ref 7–25)
BUN/CREAT SERPL: 31.8 (ref 7–25)
BUN/CREAT SERPL: 41 (ref 7–25)
BUN/CREAT SERPL: 41.2 (ref 7–25)
BUN/CREAT SERPL: 41.9 (ref 7–25)
BUN/CREAT SERPL: 43.2 (ref 7–25)
BUN/CREAT SERPL: 46.5 (ref 7–25)
BUN/CREAT SERPL: 47.6 (ref 7–25)
C PNEUM DNA NPH QL NAA+NON-PROBE: NOT DETECTED
CA-I SERPL ISE-MCNC: 1.09 MMOL/L (ref 1.12–1.32)
CA-I SERPL ISE-MCNC: 1.17 MMOL/L (ref 1.12–1.32)
CA-I SERPL ISE-MCNC: 1.32 MMOL/L (ref 1.12–1.32)
CALCIUM SPEC-SCNC: 7.8 MG/DL (ref 8.6–10.5)
CALCIUM SPEC-SCNC: 7.8 MG/DL (ref 8.6–10.5)
CALCIUM SPEC-SCNC: 7.9 MG/DL (ref 8.6–10.5)
CALCIUM SPEC-SCNC: 8.4 MG/DL (ref 8.6–10.5)
CALCIUM SPEC-SCNC: 8.5 MG/DL (ref 8.6–10.5)
CALCIUM SPEC-SCNC: 8.7 MG/DL (ref 8.6–10.5)
CALCIUM SPEC-SCNC: 8.9 MG/DL (ref 8.6–10.5)
CALCIUM SPEC-SCNC: 8.9 MG/DL (ref 8.6–10.5)
CALCIUM SPEC-SCNC: 9 MG/DL (ref 8.6–10.5)
CHLORIDE SERPL-SCNC: 100 MMOL/L (ref 98–107)
CHLORIDE SERPL-SCNC: 102 MMOL/L (ref 98–107)
CHLORIDE SERPL-SCNC: 104 MMOL/L (ref 98–107)
CHLORIDE SERPL-SCNC: 106 MMOL/L (ref 98–107)
CHLORIDE SERPL-SCNC: 107 MMOL/L (ref 98–107)
CHLORIDE SERPL-SCNC: 108 MMOL/L (ref 98–107)
CHLORIDE SERPL-SCNC: 88 MMOL/L (ref 98–107)
CHLORIDE SERPL-SCNC: 95 MMOL/L (ref 98–107)
CHLORIDE SERPL-SCNC: 99 MMOL/L (ref 98–107)
CHOLEST SERPL-MCNC: 148 MG/DL (ref 0–200)
CLARITY UR: ABNORMAL
CO2 BLDA-SCNC: 45.4 MMOL/L (ref 22–33)
CO2 BLDA-SCNC: 46.5 MMOL/L (ref 22–33)
CO2 BLDA-SCNC: 46.9 MMOL/L (ref 22–33)
CO2 BLDA-SCNC: 57.5 MMOL/L (ref 22–33)
CO2 BLDA-SCNC: NORMAL MMOL/L
CO2 SERPL-SCNC: 33 MMOL/L (ref 22–29)
CO2 SERPL-SCNC: 33 MMOL/L (ref 22–29)
CO2 SERPL-SCNC: 34 MMOL/L (ref 22–29)
CO2 SERPL-SCNC: 37 MMOL/L (ref 22–29)
CO2 SERPL-SCNC: 40 MMOL/L (ref 22–29)
CO2 SERPL-SCNC: 41 MMOL/L (ref 22–29)
CO2 SERPL-SCNC: 42 MMOL/L (ref 22–29)
CO2 SERPL-SCNC: 44 MMOL/L (ref 22–29)
CO2 SERPL-SCNC: >50 MMOL/L (ref 22–29)
COHGB MFR BLD: 1.8 % (ref 0–2)
COHGB MFR BLD: 1.8 % (ref 0–2)
COHGB MFR BLD: 1.9 % (ref 0–2)
COHGB MFR BLD: 2.3 % (ref 0–2)
COHGB MFR BLD: NORMAL %
COLOR UR: YELLOW
CORTIS SERPL-MCNC: 73.81 MCG/DL
CREAT SERPL-MCNC: 0.83 MG/DL (ref 0.57–1)
CREAT SERPL-MCNC: 0.84 MG/DL (ref 0.57–1)
CREAT SERPL-MCNC: 0.86 MG/DL (ref 0.57–1)
CREAT SERPL-MCNC: 0.86 MG/DL (ref 0.57–1)
CREAT SERPL-MCNC: 0.88 MG/DL (ref 0.57–1)
CREAT SERPL-MCNC: 1.02 MG/DL (ref 0.57–1)
CREAT SERPL-MCNC: 1.1 MG/DL (ref 0.57–1)
CREAT SERPL-MCNC: 1.23 MG/DL (ref 0.57–1)
CREAT SERPL-MCNC: 1.26 MG/DL (ref 0.57–1)
CROSSMATCH INTERPRETATION: NORMAL
CROSSMATCH INTERPRETATION: NORMAL
D DIMER PPP FEU-MCNC: 0.85 MCGFEU/ML (ref 0–0.7)
D-LACTATE SERPL-SCNC: 1.6 MMOL/L (ref 0.5–2)
D-LACTATE SERPL-SCNC: 2.5 MMOL/L (ref 0.5–2)
DEPRECATED RDW RBC AUTO: 63.3 FL (ref 37–54)
DEPRECATED RDW RBC AUTO: 63.6 FL (ref 37–54)
DEPRECATED RDW RBC AUTO: 63.7 FL (ref 37–54)
DEPRECATED RDW RBC AUTO: 63.9 FL (ref 37–54)
DEPRECATED RDW RBC AUTO: 64.8 FL (ref 37–54)
DEPRECATED RDW RBC AUTO: 66 FL (ref 37–54)
DEPRECATED RDW RBC AUTO: 66.1 FL (ref 37–54)
DEPRECATED RDW RBC AUTO: 66.4 FL (ref 37–54)
DEPRECATED RDW RBC AUTO: 70.3 FL (ref 37–54)
DEPRECATED RDW RBC AUTO: 74.2 FL (ref 37–54)
EGFRCR SERPLBLD CKD-EPI 2021: 46 ML/MIN/1.73
EGFRCR SERPLBLD CKD-EPI 2021: 47.4 ML/MIN/1.73
EGFRCR SERPLBLD CKD-EPI 2021: 54.2 ML/MIN/1.73
EGFRCR SERPLBLD CKD-EPI 2021: 59.3 ML/MIN/1.73
EGFRCR SERPLBLD CKD-EPI 2021: 70.8 ML/MIN/1.73
EGFRCR SERPLBLD CKD-EPI 2021: 72.8 ML/MIN/1.73
EGFRCR SERPLBLD CKD-EPI 2021: 72.8 ML/MIN/1.73
EGFRCR SERPLBLD CKD-EPI 2021: 74.9 ML/MIN/1.73
EGFRCR SERPLBLD CKD-EPI 2021: 75.9 ML/MIN/1.73
EOSINOPHIL # BLD AUTO: 0 10*3/MM3 (ref 0–0.4)
EOSINOPHIL # BLD AUTO: 0.07 10*3/MM3 (ref 0–0.4)
EOSINOPHIL NFR BLD AUTO: 0 % (ref 0.3–6.2)
EOSINOPHIL NFR BLD AUTO: 0.3 % (ref 0.3–6.2)
EPAP: 0
EPAP: 8
ERYTHROCYTE [DISTWIDTH] IN BLOOD BY AUTOMATED COUNT: 16.5 % (ref 12.3–15.4)
ERYTHROCYTE [DISTWIDTH] IN BLOOD BY AUTOMATED COUNT: 16.6 % (ref 12.3–15.4)
ERYTHROCYTE [DISTWIDTH] IN BLOOD BY AUTOMATED COUNT: 17.2 % (ref 12.3–15.4)
ERYTHROCYTE [DISTWIDTH] IN BLOOD BY AUTOMATED COUNT: 17.3 % (ref 12.3–15.4)
ERYTHROCYTE [DISTWIDTH] IN BLOOD BY AUTOMATED COUNT: 18.6 % (ref 12.3–15.4)
ERYTHROCYTE [DISTWIDTH] IN BLOOD BY AUTOMATED COUNT: 20.4 % (ref 12.3–15.4)
FERRITIN SERPL-MCNC: 335.6 NG/ML (ref 13–150)
FLUAV SUBTYP SPEC NAA+PROBE: NOT DETECTED
FLUBV RNA ISLT QL NAA+PROBE: NOT DETECTED
FOLATE SERPL-MCNC: 14.1 NG/ML (ref 4.78–24.2)
GEN 5 2HR TROPONIN T REFLEX: 56 NG/L
GLOBULIN UR ELPH-MCNC: 2.3 GM/DL
GLOBULIN UR ELPH-MCNC: 3.2 GM/DL
GLOBULIN UR ELPH-MCNC: 3.5 GM/DL
GLUCOSE BLDC GLUCOMTR-MCNC: 108 MG/DL (ref 70–130)
GLUCOSE BLDC GLUCOMTR-MCNC: 134 MG/DL (ref 70–130)
GLUCOSE BLDC GLUCOMTR-MCNC: 145 MG/DL (ref 70–130)
GLUCOSE BLDC GLUCOMTR-MCNC: 147 MG/DL (ref 70–130)
GLUCOSE BLDC GLUCOMTR-MCNC: 150 MG/DL (ref 70–130)
GLUCOSE BLDC GLUCOMTR-MCNC: 155 MG/DL (ref 70–130)
GLUCOSE BLDC GLUCOMTR-MCNC: 155 MG/DL (ref 70–130)
GLUCOSE BLDC GLUCOMTR-MCNC: 164 MG/DL (ref 70–130)
GLUCOSE BLDC GLUCOMTR-MCNC: 167 MG/DL (ref 70–130)
GLUCOSE BLDC GLUCOMTR-MCNC: 172 MG/DL (ref 70–130)
GLUCOSE BLDC GLUCOMTR-MCNC: 180 MG/DL (ref 70–130)
GLUCOSE BLDC GLUCOMTR-MCNC: 189 MG/DL (ref 70–130)
GLUCOSE BLDC GLUCOMTR-MCNC: 196 MG/DL (ref 70–130)
GLUCOSE BLDC GLUCOMTR-MCNC: 198 MG/DL (ref 70–130)
GLUCOSE BLDC GLUCOMTR-MCNC: 199 MG/DL (ref 70–130)
GLUCOSE BLDC GLUCOMTR-MCNC: 201 MG/DL (ref 70–130)
GLUCOSE BLDC GLUCOMTR-MCNC: 209 MG/DL (ref 70–130)
GLUCOSE BLDC GLUCOMTR-MCNC: 212 MG/DL (ref 70–130)
GLUCOSE BLDC GLUCOMTR-MCNC: 217 MG/DL (ref 70–130)
GLUCOSE BLDC GLUCOMTR-MCNC: 219 MG/DL (ref 70–130)
GLUCOSE BLDC GLUCOMTR-MCNC: 249 MG/DL (ref 70–130)
GLUCOSE BLDC GLUCOMTR-MCNC: 250 MG/DL (ref 70–130)
GLUCOSE BLDC GLUCOMTR-MCNC: 96 MG/DL (ref 70–130)
GLUCOSE SERPL-MCNC: 112 MG/DL (ref 65–99)
GLUCOSE SERPL-MCNC: 134 MG/DL (ref 65–99)
GLUCOSE SERPL-MCNC: 159 MG/DL (ref 65–99)
GLUCOSE SERPL-MCNC: 164 MG/DL (ref 65–99)
GLUCOSE SERPL-MCNC: 212 MG/DL (ref 65–99)
GLUCOSE SERPL-MCNC: 220 MG/DL (ref 65–99)
GLUCOSE SERPL-MCNC: 416 MG/DL (ref 65–99)
GLUCOSE SERPL-MCNC: 89 MG/DL (ref 65–99)
GLUCOSE SERPL-MCNC: 91 MG/DL (ref 65–99)
GLUCOSE UR STRIP-MCNC: NEGATIVE MG/DL
HADV DNA SPEC NAA+PROBE: NOT DETECTED
HCO3 BLDA-SCNC: 42.8 MMOL/L (ref 20–26)
HCO3 BLDA-SCNC: 43.8 MMOL/L (ref 20–26)
HCO3 BLDA-SCNC: 44.3 MMOL/L (ref 20–26)
HCO3 BLDA-SCNC: 54.6 MMOL/L (ref 20–26)
HCO3 BLDA-SCNC: NORMAL MMOL/L
HCOV 229E RNA SPEC QL NAA+PROBE: NOT DETECTED
HCOV HKU1 RNA SPEC QL NAA+PROBE: NOT DETECTED
HCOV NL63 RNA SPEC QL NAA+PROBE: NOT DETECTED
HCOV OC43 RNA SPEC QL NAA+PROBE: NOT DETECTED
HCT VFR BLD AUTO: 23.8 % (ref 34–46.6)
HCT VFR BLD AUTO: 25.7 % (ref 34–46.6)
HCT VFR BLD AUTO: 26 % (ref 34–46.6)
HCT VFR BLD AUTO: 27.4 % (ref 34–46.6)
HCT VFR BLD AUTO: 30 % (ref 34–46.6)
HCT VFR BLD AUTO: 30.9 % (ref 34–46.6)
HCT VFR BLD AUTO: 31.1 % (ref 34–46.6)
HCT VFR BLD AUTO: 31.4 % (ref 34–46.6)
HCT VFR BLD AUTO: 32.1 % (ref 34–46.6)
HCT VFR BLD AUTO: 32.1 % (ref 34–46.6)
HCT VFR BLD AUTO: 32.9 % (ref 34–46.6)
HCT VFR BLD AUTO: 33.1 % (ref 34–46.6)
HCT VFR BLD AUTO: 33.1 % (ref 34–46.6)
HCT VFR BLD CALC: 20.2 % (ref 38–51)
HCT VFR BLD CALC: 21 % (ref 38–51)
HCT VFR BLD CALC: 21.2 % (ref 38–51)
HCT VFR BLD CALC: 24.1 % (ref 38–51)
HCT VFR BLD CALC: NORMAL %
HEMOCCULT STL QL: NEGATIVE
HGB BLD-MCNC: 6.3 G/DL (ref 12–15.9)
HGB BLD-MCNC: 7 G/DL (ref 12–15.9)
HGB BLD-MCNC: 7 G/DL (ref 12–15.9)
HGB BLD-MCNC: 7.8 G/DL (ref 12–15.9)
HGB BLD-MCNC: 8.1 G/DL (ref 12–15.9)
HGB BLD-MCNC: 8.3 G/DL (ref 12–15.9)
HGB BLD-MCNC: 8.3 G/DL (ref 12–15.9)
HGB BLD-MCNC: 8.6 G/DL (ref 12–15.9)
HGB BLD-MCNC: 8.6 G/DL (ref 12–15.9)
HGB BLD-MCNC: 8.7 G/DL (ref 12–15.9)
HGB BLD-MCNC: 8.9 G/DL (ref 12–15.9)
HGB BLD-MCNC: 8.9 G/DL (ref 12–15.9)
HGB BLD-MCNC: 9.2 G/DL (ref 12–15.9)
HGB BLDA-MCNC: 6.6 G/DL (ref 14–18)
HGB BLDA-MCNC: 6.8 G/DL (ref 14–18)
HGB BLDA-MCNC: 6.9 G/DL (ref 14–18)
HGB BLDA-MCNC: 7.9 G/DL (ref 14–18)
HGB BLDA-MCNC: NORMAL G/DL
HGB UR QL STRIP.AUTO: ABNORMAL
HMPV RNA NPH QL NAA+NON-PROBE: NOT DETECTED
HOLD SPECIMEN: NORMAL
HPIV1 RNA ISLT QL NAA+PROBE: NOT DETECTED
HPIV2 RNA SPEC QL NAA+PROBE: NOT DETECTED
HPIV3 RNA NPH QL NAA+PROBE: NOT DETECTED
HPIV4 P GENE NPH QL NAA+PROBE: NOT DETECTED
HYALINE CASTS UR QL AUTO: ABNORMAL /LPF
HYPOCHROMIA BLD QL: NORMAL
IMM GRANULOCYTES # BLD AUTO: 0.07 10*3/MM3 (ref 0–0.05)
IMM GRANULOCYTES # BLD AUTO: 0.15 10*3/MM3 (ref 0–0.05)
IMM GRANULOCYTES # BLD AUTO: 0.19 10*3/MM3 (ref 0–0.05)
IMM GRANULOCYTES # BLD AUTO: 0.24 10*3/MM3 (ref 0–0.05)
IMM GRANULOCYTES NFR BLD AUTO: 0.5 % (ref 0–0.5)
IMM GRANULOCYTES NFR BLD AUTO: 0.9 % (ref 0–0.5)
IMM GRANULOCYTES NFR BLD AUTO: 0.9 % (ref 0–0.5)
IMM GRANULOCYTES NFR BLD AUTO: 1.3 % (ref 0–0.5)
INHALED O2 CONCENTRATION: 40 %
INHALED O2 CONCENTRATION: 45 %
INHALED O2 CONCENTRATION: 45 %
INHALED O2 CONCENTRATION: 80 %
INHALED O2 CONCENTRATION: NORMAL %
IPAP: 0
IPAP: 18
IRON 24H UR-MRATE: 8 MCG/DL (ref 37–145)
IRON SATN MFR SERPL: 3 % (ref 20–50)
IVRT: 87 MSEC
KETONES UR QL STRIP: NEGATIVE
L PNEUMO1 AG UR QL IA: NEGATIVE
LEFT ATRIUM VOLUME INDEX: 27.6 ML/M2
LEUKOCYTE ESTERASE UR QL STRIP.AUTO: ABNORMAL
LYMPHOCYTES # BLD AUTO: 0.25 10*3/MM3 (ref 0.7–3.1)
LYMPHOCYTES # BLD AUTO: 0.34 10*3/MM3 (ref 0.7–3.1)
LYMPHOCYTES # BLD AUTO: 0.68 10*3/MM3 (ref 0.7–3.1)
LYMPHOCYTES # BLD AUTO: 0.79 10*3/MM3 (ref 0.7–3.1)
LYMPHOCYTES NFR BLD AUTO: 2.1 % (ref 19.6–45.3)
LYMPHOCYTES NFR BLD AUTO: 2.3 % (ref 19.6–45.3)
LYMPHOCYTES NFR BLD AUTO: 2.8 % (ref 19.6–45.3)
LYMPHOCYTES NFR BLD AUTO: 3.1 % (ref 19.6–45.3)
Lab: ABNORMAL
M PNEUMO IGG SER IA-ACNC: NOT DETECTED
MAGNESIUM SERPL-MCNC: 0.8 MG/DL (ref 1.6–2.4)
MAGNESIUM SERPL-MCNC: 1.6 MG/DL (ref 1.6–2.4)
MAGNESIUM SERPL-MCNC: 1.9 MG/DL (ref 1.6–2.4)
MAGNESIUM SERPL-MCNC: 1.9 MG/DL (ref 1.6–2.4)
MAGNESIUM SERPL-MCNC: 2.1 MG/DL (ref 1.6–2.4)
MAGNESIUM SERPL-MCNC: 2.2 MG/DL (ref 1.6–2.4)
MAGNESIUM SERPL-MCNC: 2.2 MG/DL (ref 1.6–2.4)
MAGNESIUM SERPL-MCNC: 2.4 MG/DL (ref 1.6–2.4)
MAGNESIUM SERPL-MCNC: 2.5 MG/DL (ref 1.6–2.4)
MAGNESIUM SERPL-MCNC: 3.2 MG/DL (ref 1.6–2.4)
MAXIMAL PREDICTED HEART RATE: 150 BPM
MCH RBC QN AUTO: 27.4 PG (ref 26.6–33)
MCH RBC QN AUTO: 27.6 PG (ref 26.6–33)
MCH RBC QN AUTO: 27.7 PG (ref 26.6–33)
MCH RBC QN AUTO: 27.9 PG (ref 26.6–33)
MCH RBC QN AUTO: 28 PG (ref 26.6–33)
MCH RBC QN AUTO: 28.1 PG (ref 26.6–33)
MCH RBC QN AUTO: 28.1 PG (ref 26.6–33)
MCH RBC QN AUTO: 28.5 PG (ref 26.6–33)
MCH RBC QN AUTO: 28.6 PG (ref 26.6–33)
MCH RBC QN AUTO: 28.6 PG (ref 26.6–33)
MCHC RBC AUTO-ENTMCNC: 26.5 G/DL (ref 31.5–35.7)
MCHC RBC AUTO-ENTMCNC: 26.8 G/DL (ref 31.5–35.7)
MCHC RBC AUTO-ENTMCNC: 26.9 G/DL (ref 31.5–35.7)
MCHC RBC AUTO-ENTMCNC: 27 G/DL (ref 31.5–35.7)
MCHC RBC AUTO-ENTMCNC: 27.1 G/DL (ref 31.5–35.7)
MCHC RBC AUTO-ENTMCNC: 27.2 G/DL (ref 31.5–35.7)
MCHC RBC AUTO-ENTMCNC: 27.8 G/DL (ref 31.5–35.7)
MCHC RBC AUTO-ENTMCNC: 28.5 G/DL (ref 31.5–35.7)
MCV RBC AUTO: 100 FL (ref 79–97)
MCV RBC AUTO: 102.5 FL (ref 79–97)
MCV RBC AUTO: 103.5 FL (ref 79–97)
MCV RBC AUTO: 103.7 FL (ref 79–97)
MCV RBC AUTO: 103.8 FL (ref 79–97)
MCV RBC AUTO: 104.4 FL (ref 79–97)
MCV RBC AUTO: 104.9 FL (ref 79–97)
MCV RBC AUTO: 104.9 FL (ref 79–97)
MCV RBC AUTO: 106.4 FL (ref 79–97)
MCV RBC AUTO: 99.4 FL (ref 79–97)
METHGB BLD QL: -0.1 % (ref 0–1.5)
METHGB BLD QL: -0.1 % (ref 0–1.5)
METHGB BLD QL: 0 % (ref 0–1.5)
METHGB BLD QL: ABNORMAL
METHGB BLD QL: NORMAL
MODALITY: ABNORMAL
MODALITY: NORMAL
MONOCYTES # BLD AUTO: 0.36 10*3/MM3 (ref 0.1–0.9)
MONOCYTES # BLD AUTO: 0.43 10*3/MM3 (ref 0.1–0.9)
MONOCYTES # BLD AUTO: 0.98 10*3/MM3 (ref 0.1–0.9)
MONOCYTES # BLD AUTO: 2.11 10*3/MM3 (ref 0.1–0.9)
MONOCYTES NFR BLD AUTO: 2.4 % (ref 5–12)
MONOCYTES NFR BLD AUTO: 3.6 % (ref 5–12)
MONOCYTES NFR BLD AUTO: 4.4 % (ref 5–12)
MONOCYTES NFR BLD AUTO: 7.6 % (ref 5–12)
MRSA DNA SPEC QL NAA+PROBE: NEGATIVE
NEUTROPHILS NFR BLD AUTO: 11.13 10*3/MM3 (ref 1.7–7)
NEUTROPHILS NFR BLD AUTO: 14.19 10*3/MM3 (ref 1.7–7)
NEUTROPHILS NFR BLD AUTO: 20.38 10*3/MM3 (ref 1.7–7)
NEUTROPHILS NFR BLD AUTO: 24.47 10*3/MM3 (ref 1.7–7)
NEUTROPHILS NFR BLD AUTO: 88.2 % (ref 42.7–76)
NEUTROPHILS NFR BLD AUTO: 91.5 % (ref 42.7–76)
NEUTROPHILS NFR BLD AUTO: 92.8 % (ref 42.7–76)
NEUTROPHILS NFR BLD AUTO: 94.7 % (ref 42.7–76)
NITRITE UR QL STRIP: NEGATIVE
NOTE: ABNORMAL
NOTE: NORMAL
NOTIFIED BY: ABNORMAL
NOTIFIED WHO: ABNORMAL
NRBC BLD AUTO-RTO: 0 /100 WBC (ref 0–0.2)
NT-PROBNP SERPL-MCNC: 3834 PG/ML (ref 0–900)
OXYHGB MFR BLDV: 86.2 % (ref 94–99)
OXYHGB MFR BLDV: 95.6 % (ref 94–99)
OXYHGB MFR BLDV: 96.8 % (ref 94–99)
OXYHGB MFR BLDV: 98.1 % (ref 94–99)
OXYHGB MFR BLDV: NORMAL %
PAW @ PEAK INSP FLOW SETTING VENT: 0 CMH2O
PCO2 BLDA: 84 MM HG (ref 35–45)
PCO2 BLDA: 87.2 MM HG (ref 35–45)
PCO2 BLDA: 88.8 MM HG (ref 35–45)
PCO2 BLDA: 93.2 MM HG (ref 35–45)
PCO2 BLDA: NORMAL MM[HG]
PCO2 TEMP ADJ BLD: 84 MM HG (ref 35–45)
PCO2 TEMP ADJ BLD: 87.2 MM HG (ref 35–45)
PCO2 TEMP ADJ BLD: 88.8 MM HG (ref 35–45)
PCO2 TEMP ADJ BLD: 93.2 MM HG (ref 35–45)
PCO2 TEMP ADJ BLD: NORMAL MM[HG]
PH BLDA: 7.3 PH UNITS (ref 7.35–7.45)
PH BLDA: 7.31 PH UNITS (ref 7.35–7.45)
PH BLDA: 7.32 PH UNITS (ref 7.35–7.45)
PH BLDA: 7.38 PH UNITS (ref 7.35–7.45)
PH BLDA: NORMAL [PH]
PH UR STRIP.AUTO: 6 [PH] (ref 5–8)
PH, TEMP CORRECTED: 7.3 PH UNITS
PH, TEMP CORRECTED: 7.31 PH UNITS
PH, TEMP CORRECTED: 7.32 PH UNITS
PH, TEMP CORRECTED: 7.38 PH UNITS
PH, TEMP CORRECTED: NORMAL
PHOSPHATE SERPL-MCNC: 2 MG/DL (ref 2.5–4.5)
PHOSPHATE SERPL-MCNC: 2 MG/DL (ref 2.5–4.5)
PHOSPHATE SERPL-MCNC: 2.5 MG/DL (ref 2.5–4.5)
PHOSPHATE SERPL-MCNC: 2.5 MG/DL (ref 2.5–4.5)
PHOSPHATE SERPL-MCNC: 2.6 MG/DL (ref 2.5–4.5)
PHOSPHATE SERPL-MCNC: 3.3 MG/DL (ref 2.5–4.5)
PHOSPHATE SERPL-MCNC: 3.6 MG/DL (ref 2.5–4.5)
PHOSPHATE SERPL-MCNC: 4 MG/DL (ref 2.5–4.5)
PHOSPHATE SERPL-MCNC: 4.3 MG/DL (ref 2.5–4.5)
PLAT MORPH BLD: NORMAL
PLATELET # BLD AUTO: 114 10*3/MM3 (ref 140–450)
PLATELET # BLD AUTO: 116 10*3/MM3 (ref 140–450)
PLATELET # BLD AUTO: 126 10*3/MM3 (ref 140–450)
PLATELET # BLD AUTO: 132 10*3/MM3 (ref 140–450)
PLATELET # BLD AUTO: 143 10*3/MM3 (ref 140–450)
PLATELET # BLD AUTO: 161 10*3/MM3 (ref 140–450)
PLATELET # BLD AUTO: 179 10*3/MM3 (ref 140–450)
PLATELET # BLD AUTO: 191 10*3/MM3 (ref 140–450)
PLATELET # BLD AUTO: 192 10*3/MM3 (ref 140–450)
PLATELET # BLD AUTO: 251 10*3/MM3 (ref 140–450)
PMV BLD AUTO: 10 FL (ref 6–12)
PMV BLD AUTO: 10 FL (ref 6–12)
PMV BLD AUTO: 10.1 FL (ref 6–12)
PMV BLD AUTO: 10.3 FL (ref 6–12)
PMV BLD AUTO: 11.1 FL (ref 6–12)
PMV BLD AUTO: 9.3 FL (ref 6–12)
PMV BLD AUTO: 9.5 FL (ref 6–12)
PMV BLD AUTO: 9.5 FL (ref 6–12)
PMV BLD AUTO: 9.7 FL (ref 6–12)
PMV BLD AUTO: 9.9 FL (ref 6–12)
PO2 BLDA: 106 MM HG (ref 83–108)
PO2 BLDA: 173 MM HG (ref 83–108)
PO2 BLDA: 61.1 MM HG (ref 83–108)
PO2 BLDA: 77.9 MM HG (ref 83–108)
PO2 BLDA: NORMAL MM[HG]
PO2 TEMP ADJ BLD: 106 MM HG (ref 83–108)
PO2 TEMP ADJ BLD: 173 MM HG (ref 83–108)
PO2 TEMP ADJ BLD: 61.1 MM HG (ref 83–108)
PO2 TEMP ADJ BLD: 77.9 MM HG (ref 83–108)
PO2 TEMP ADJ BLD: NORMAL MM[HG]
POTASSIUM SERPL-SCNC: 3.1 MMOL/L (ref 3.5–5.2)
POTASSIUM SERPL-SCNC: 3.6 MMOL/L (ref 3.5–5.2)
POTASSIUM SERPL-SCNC: 4.2 MMOL/L (ref 3.5–5.2)
POTASSIUM SERPL-SCNC: 4.5 MMOL/L (ref 3.5–5.2)
POTASSIUM SERPL-SCNC: 4.6 MMOL/L (ref 3.5–5.2)
POTASSIUM SERPL-SCNC: 4.7 MMOL/L (ref 3.5–5.2)
POTASSIUM SERPL-SCNC: 4.8 MMOL/L (ref 3.5–5.2)
POTASSIUM SERPL-SCNC: 5 MMOL/L (ref 3.5–5.2)
PROCALCITONIN SERPL-MCNC: 1.22 NG/ML (ref 0–0.25)
PROT SERPL-MCNC: 5.2 G/DL (ref 6–8.5)
PROT SERPL-MCNC: 6.2 G/DL (ref 6–8.5)
PROT SERPL-MCNC: 6.7 G/DL (ref 6–8.5)
PROT UR QL STRIP: ABNORMAL
PSV: 10 CMH2O
QT INTERVAL: 266 MS
QT INTERVAL: 268 MS
QT INTERVAL: 334 MS
QT INTERVAL: 374 MS
QT INTERVAL: 376 MS
QT INTERVAL: 380 MS
QT INTERVAL: 402 MS
QT INTERVAL: 464 MS
QTC INTERVAL: 408 MS
QTC INTERVAL: 413 MS
QTC INTERVAL: 423 MS
QTC INTERVAL: 431 MS
QTC INTERVAL: 431 MS
QTC INTERVAL: 464 MS
QTC INTERVAL: 475 MS
QTC INTERVAL: 655 MS
RBC # BLD AUTO: 2.3 10*6/MM3 (ref 3.77–5.28)
RBC # BLD AUTO: 2.45 10*6/MM3 (ref 3.77–5.28)
RBC # BLD AUTO: 2.49 10*6/MM3 (ref 3.77–5.28)
RBC # BLD AUTO: 2.74 10*6/MM3 (ref 3.77–5.28)
RBC # BLD AUTO: 2.89 10*6/MM3 (ref 3.77–5.28)
RBC # BLD AUTO: 2.98 10*6/MM3 (ref 3.77–5.28)
RBC # BLD AUTO: 3.06 10*6/MM3 (ref 3.77–5.28)
RBC # BLD AUTO: 3.11 10*6/MM3 (ref 3.77–5.28)
RBC # BLD AUTO: 3.21 10*6/MM3 (ref 3.77–5.28)
RBC # BLD AUTO: 3.33 10*6/MM3 (ref 3.77–5.28)
RBC # UR STRIP: ABNORMAL /HPF
REF LAB TEST METHOD: ABNORMAL
RH BLD: POSITIVE
RH BLD: POSITIVE
RHINOVIRUS RNA SPEC NAA+PROBE: NOT DETECTED
RSV RNA NPH QL NAA+NON-PROBE: NOT DETECTED
S PNEUM AG SPEC QL LA: NEGATIVE
SARS-COV-2 RNA NPH QL NAA+NON-PROBE: NOT DETECTED
SODIUM SERPL-SCNC: 141 MMOL/L (ref 136–145)
SODIUM SERPL-SCNC: 142 MMOL/L (ref 136–145)
SODIUM SERPL-SCNC: 145 MMOL/L (ref 136–145)
SODIUM SERPL-SCNC: 146 MMOL/L (ref 136–145)
SODIUM SERPL-SCNC: 147 MMOL/L (ref 136–145)
SODIUM SERPL-SCNC: 147 MMOL/L (ref 136–145)
SODIUM SERPL-SCNC: 148 MMOL/L (ref 136–145)
SODIUM SERPL-SCNC: 148 MMOL/L (ref 136–145)
SODIUM SERPL-SCNC: 150 MMOL/L (ref 136–145)
SP GR UR STRIP: 1.01 (ref 1–1.03)
SQUAMOUS #/AREA URNS HPF: ABNORMAL /HPF
STOMATOCYTES BLD QL SMEAR: NORMAL
STRESS TARGET HR: 128 BPM
T&S EXPIRATION DATE: NORMAL
TIBC SERPL-MCNC: 235 MCG/DL (ref 298–536)
TOTAL RATE: 0 BREATHS/MINUTE
TOTAL RATE: 14 BREATHS/MINUTE
TRANSFERRIN SERPL-MCNC: 158 MG/DL (ref 200–360)
TRIGL SERPL-MCNC: 267 MG/DL (ref 0–150)
TROPONIN T DELTA: -3 NG/L
TROPONIN T SERPL HS-MCNC: 42 NG/L
TROPONIN T SERPL HS-MCNC: 59 NG/L
TSH SERPL DL<=0.05 MIU/L-ACNC: 0.45 UIU/ML (ref 0.27–4.2)
UNIT  ABO: NORMAL
UNIT  ABO: NORMAL
UNIT  RH: NORMAL
UNIT  RH: NORMAL
UROBILINOGEN UR QL STRIP: ABNORMAL
VANCOMYCIN SERPL-MCNC: 9.4 MCG/ML (ref 5–40)
VENTILATOR MODE: ABNORMAL
VIT B12 BLD-MCNC: 450 PG/ML (ref 211–946)
VT ON VENT VENT: 0.35 ML
WBC # UR STRIP: ABNORMAL /HPF
WBC MORPH BLD: NORMAL
WBC NRBC COR # BLD: 10.85 10*3/MM3 (ref 3.4–10.8)
WBC NRBC COR # BLD: 11.37 10*3/MM3 (ref 3.4–10.8)
WBC NRBC COR # BLD: 11.98 10*3/MM3 (ref 3.4–10.8)
WBC NRBC COR # BLD: 14.97 10*3/MM3 (ref 3.4–10.8)
WBC NRBC COR # BLD: 20.32 10*3/MM3 (ref 3.4–10.8)
WBC NRBC COR # BLD: 22.26 10*3/MM3 (ref 3.4–10.8)
WBC NRBC COR # BLD: 27.74 10*3/MM3 (ref 3.4–10.8)
WBC NRBC COR # BLD: 4.75 10*3/MM3 (ref 3.4–10.8)
WBC NRBC COR # BLD: 7.6 10*3/MM3 (ref 3.4–10.8)
WBC NRBC COR # BLD: 9.27 10*3/MM3 (ref 3.4–10.8)
WHOLE BLOOD HOLD COAG: NORMAL
WHOLE BLOOD HOLD SPECIMEN: NORMAL

## 2023-01-01 PROCEDURE — 25010000002 AMIODARONE IN DEXTROSE 5% 150-4.21 MG/100ML-% SOLUTION: Performed by: INTERNAL MEDICINE

## 2023-01-01 PROCEDURE — 83605 ASSAY OF LACTIC ACID: CPT | Performed by: EMERGENCY MEDICINE

## 2023-01-01 PROCEDURE — 82375 ASSAY CARBOXYHB QUANT: CPT

## 2023-01-01 PROCEDURE — 94660 CPAP INITIATION&MGMT: CPT

## 2023-01-01 PROCEDURE — 99233 SBSQ HOSP IP/OBS HIGH 50: CPT | Performed by: NURSE PRACTITIONER

## 2023-01-01 PROCEDURE — 94799 UNLISTED PULMONARY SVC/PX: CPT

## 2023-01-01 PROCEDURE — 25010000002 PIPERACILLIN SOD-TAZOBACTAM PER 1 G: Performed by: NURSE PRACTITIONER

## 2023-01-01 PROCEDURE — 36415 COLL VENOUS BLD VENIPUNCTURE: CPT

## 2023-01-01 PROCEDURE — 83735 ASSAY OF MAGNESIUM: CPT | Performed by: NURSE PRACTITIONER

## 2023-01-01 PROCEDURE — 84100 ASSAY OF PHOSPHORUS: CPT | Performed by: NURSE PRACTITIONER

## 2023-01-01 PROCEDURE — 74018 RADEX ABDOMEN 1 VIEW: CPT

## 2023-01-01 PROCEDURE — 36600 WITHDRAWAL OF ARTERIAL BLOOD: CPT

## 2023-01-01 PROCEDURE — 71045 X-RAY EXAM CHEST 1 VIEW: CPT

## 2023-01-01 PROCEDURE — 63710000001 INSULIN REGULAR HUMAN PER 5 UNITS: Performed by: NURSE PRACTITIONER

## 2023-01-01 PROCEDURE — 0 MAGNESIUM SULFATE 4 GM/100ML SOLUTION: Performed by: NURSE PRACTITIONER

## 2023-01-01 PROCEDURE — 80048 BASIC METABOLIC PNL TOTAL CA: CPT | Performed by: NURSE PRACTITIONER

## 2023-01-01 PROCEDURE — 63710000001 PREDNISONE PER 1 MG: Performed by: INTERNAL MEDICINE

## 2023-01-01 PROCEDURE — 25010000002 MAGNESIUM SULFATE 2 GM/50ML SOLUTION: Performed by: NURSE PRACTITIONER

## 2023-01-01 PROCEDURE — 82746 ASSAY OF FOLIC ACID SERUM: CPT | Performed by: INTERNAL MEDICINE

## 2023-01-01 PROCEDURE — 86850 RBC ANTIBODY SCREEN: CPT

## 2023-01-01 PROCEDURE — 84443 ASSAY THYROID STIM HORMONE: CPT | Performed by: INTERNAL MEDICINE

## 2023-01-01 PROCEDURE — 93005 ELECTROCARDIOGRAM TRACING: CPT | Performed by: NURSE PRACTITIONER

## 2023-01-01 PROCEDURE — 99232 SBSQ HOSP IP/OBS MODERATE 35: CPT | Performed by: INTERNAL MEDICINE

## 2023-01-01 PROCEDURE — 25010000002 HEPARIN (PORCINE) PER 1000 UNITS: Performed by: INTERNAL MEDICINE

## 2023-01-01 PROCEDURE — 94664 DEMO&/EVAL PT USE INHALER: CPT

## 2023-01-01 PROCEDURE — 80053 COMPREHEN METABOLIC PANEL: CPT | Performed by: EMERGENCY MEDICINE

## 2023-01-01 PROCEDURE — 93306 TTE W/DOPPLER COMPLETE: CPT | Performed by: INTERNAL MEDICINE

## 2023-01-01 PROCEDURE — 25010000002 PHENYLEPHRINE 10 MG/ML SOLUTION 5 ML VIAL: Performed by: INTERNAL MEDICINE

## 2023-01-01 PROCEDURE — 25010000002 LORAZEPAM PER 2 MG: Performed by: INTERNAL MEDICINE

## 2023-01-01 PROCEDURE — 85014 HEMATOCRIT: CPT | Performed by: NURSE PRACTITIONER

## 2023-01-01 PROCEDURE — 87641 MR-STAPH DNA AMP PROBE: CPT | Performed by: NURSE PRACTITIONER

## 2023-01-01 PROCEDURE — 25010000002 AMIODARONE IN DEXTROSE 5% 360-4.14 MG/200ML-% SOLUTION: Performed by: NURSE PRACTITIONER

## 2023-01-01 PROCEDURE — 83540 ASSAY OF IRON: CPT | Performed by: INTERNAL MEDICINE

## 2023-01-01 PROCEDURE — 25010000002 PIPERACILLIN SOD-TAZOBACTAM PER 1 G: Performed by: EMERGENCY MEDICINE

## 2023-01-01 PROCEDURE — 82805 BLOOD GASES W/O2 SATURATION: CPT

## 2023-01-01 PROCEDURE — 93005 ELECTROCARDIOGRAM TRACING: CPT | Performed by: INTERNAL MEDICINE

## 2023-01-01 PROCEDURE — 0 POTASSIUM CHLORIDE 10 MEQ/100ML SOLUTION: Performed by: EMERGENCY MEDICINE

## 2023-01-01 PROCEDURE — 82962 GLUCOSE BLOOD TEST: CPT

## 2023-01-01 PROCEDURE — 71275 CT ANGIOGRAPHY CHEST: CPT

## 2023-01-01 PROCEDURE — 25010000002 VANCOMYCIN 10 G RECONSTITUTED SOLUTION

## 2023-01-01 PROCEDURE — 93010 ELECTROCARDIOGRAM REPORT: CPT | Performed by: INTERNAL MEDICINE

## 2023-01-01 PROCEDURE — 80053 COMPREHEN METABOLIC PANEL: CPT

## 2023-01-01 PROCEDURE — 99239 HOSP IP/OBS DSCHRG MGMT >30: CPT | Performed by: HOSPITALIST

## 2023-01-01 PROCEDURE — 86900 BLOOD TYPING SEROLOGIC ABO: CPT

## 2023-01-01 PROCEDURE — 83735 ASSAY OF MAGNESIUM: CPT

## 2023-01-01 PROCEDURE — 25010000002 VANCOMYCIN PER 500 MG: Performed by: INTERNAL MEDICINE

## 2023-01-01 PROCEDURE — 84100 ASSAY OF PHOSPHORUS: CPT

## 2023-01-01 PROCEDURE — 25010000002 METOCLOPRAMIDE PER 10 MG: Performed by: NURSE PRACTITIONER

## 2023-01-01 PROCEDURE — 84484 ASSAY OF TROPONIN QUANT: CPT | Performed by: EMERGENCY MEDICINE

## 2023-01-01 PROCEDURE — 84145 PROCALCITONIN (PCT): CPT | Performed by: NURSE PRACTITIONER

## 2023-01-01 PROCEDURE — 99232 SBSQ HOSP IP/OBS MODERATE 35: CPT

## 2023-01-01 PROCEDURE — 99291 CRITICAL CARE FIRST HOUR: CPT | Performed by: INTERNAL MEDICINE

## 2023-01-01 PROCEDURE — 94669 MECHANICAL CHEST WALL OSCILL: CPT

## 2023-01-01 PROCEDURE — 93005 ELECTROCARDIOGRAM TRACING: CPT | Performed by: EMERGENCY MEDICINE

## 2023-01-01 PROCEDURE — 83050 HGB METHEMOGLOBIN QUAN: CPT

## 2023-01-01 PROCEDURE — 25010000002 METHYLPREDNISOLONE PER 40 MG: Performed by: INTERNAL MEDICINE

## 2023-01-01 PROCEDURE — 63710000001 INSULIN DETEMIR PER 5 UNITS: Performed by: NURSE PRACTITIONER

## 2023-01-01 PROCEDURE — 87040 BLOOD CULTURE FOR BACTERIA: CPT | Performed by: EMERGENCY MEDICINE

## 2023-01-01 PROCEDURE — 25010000002 HEPARIN (PORCINE) PER 1000 UNITS: Performed by: NURSE PRACTITIONER

## 2023-01-01 PROCEDURE — 85379 FIBRIN DEGRADATION QUANT: CPT | Performed by: EMERGENCY MEDICINE

## 2023-01-01 PROCEDURE — 84132 ASSAY OF SERUM POTASSIUM: CPT | Performed by: NURSE PRACTITIONER

## 2023-01-01 PROCEDURE — 82272 OCCULT BLD FECES 1-3 TESTS: CPT | Performed by: NURSE PRACTITIONER

## 2023-01-01 PROCEDURE — 86923 COMPATIBILITY TEST ELECTRIC: CPT

## 2023-01-01 PROCEDURE — 92610 EVALUATE SWALLOWING FUNCTION: CPT

## 2023-01-01 PROCEDURE — 25510000001 IOPAMIDOL PER 1 ML: Performed by: EMERGENCY MEDICINE

## 2023-01-01 PROCEDURE — 85027 COMPLETE CBC AUTOMATED: CPT

## 2023-01-01 PROCEDURE — 83880 ASSAY OF NATRIURETIC PEPTIDE: CPT | Performed by: EMERGENCY MEDICINE

## 2023-01-01 PROCEDURE — 85027 COMPLETE CBC AUTOMATED: CPT | Performed by: NURSE PRACTITIONER

## 2023-01-01 PROCEDURE — 84466 ASSAY OF TRANSFERRIN: CPT | Performed by: INTERNAL MEDICINE

## 2023-01-01 PROCEDURE — 02HV33Z INSERTION OF INFUSION DEVICE INTO SUPERIOR VENA CAVA, PERCUTANEOUS APPROACH: ICD-10-PCS | Performed by: INTERNAL MEDICINE

## 2023-01-01 PROCEDURE — 82607 VITAMIN B-12: CPT | Performed by: INTERNAL MEDICINE

## 2023-01-01 PROCEDURE — 94640 AIRWAY INHALATION TREATMENT: CPT

## 2023-01-01 PROCEDURE — 85025 COMPLETE CBC W/AUTO DIFF WBC: CPT

## 2023-01-01 PROCEDURE — 25010000002 AMIODARONE IN DEXTROSE 5% 150-4.21 MG/100ML-% SOLUTION: Performed by: NURSE PRACTITIONER

## 2023-01-01 PROCEDURE — 85007 BL SMEAR W/DIFF WBC COUNT: CPT | Performed by: NURSE PRACTITIONER

## 2023-01-01 PROCEDURE — 0202U NFCT DS 22 TRGT SARS-COV-2: CPT | Performed by: EMERGENCY MEDICINE

## 2023-01-01 PROCEDURE — 25010000002 AMIODARONE IN DEXTROSE 5% 360-4.14 MG/200ML-% SOLUTION: Performed by: INTERNAL MEDICINE

## 2023-01-01 PROCEDURE — 85025 COMPLETE CBC W/AUTO DIFF WBC: CPT | Performed by: NURSE PRACTITIONER

## 2023-01-01 PROCEDURE — 93306 TTE W/DOPPLER COMPLETE: CPT

## 2023-01-01 PROCEDURE — 25010000002 ALBUMIN HUMAN 5% PER 50 ML: Performed by: EMERGENCY MEDICINE

## 2023-01-01 PROCEDURE — 25010000002 METHYLPREDNISOLONE PER 40 MG: Performed by: NURSE PRACTITIONER

## 2023-01-01 PROCEDURE — 85018 HEMOGLOBIN: CPT | Performed by: NURSE PRACTITIONER

## 2023-01-01 PROCEDURE — 87449 NOS EACH ORGANISM AG IA: CPT | Performed by: NURSE PRACTITIONER

## 2023-01-01 PROCEDURE — 93010 ELECTROCARDIOGRAM REPORT: CPT | Performed by: STUDENT IN AN ORGANIZED HEALTH CARE EDUCATION/TRAINING PROGRAM

## 2023-01-01 PROCEDURE — C1751 CATH, INF, PER/CENT/MIDLINE: HCPCS

## 2023-01-01 PROCEDURE — 25010000002 LORAZEPAM PER 2 MG

## 2023-01-01 PROCEDURE — 86901 BLOOD TYPING SEROLOGIC RH(D): CPT

## 2023-01-01 PROCEDURE — 82533 TOTAL CORTISOL: CPT | Performed by: NURSE PRACTITIONER

## 2023-01-01 PROCEDURE — 94761 N-INVAS EAR/PLS OXIMETRY MLT: CPT

## 2023-01-01 PROCEDURE — 92612 ENDOSCOPY SWALLOW (FEES) VID: CPT

## 2023-01-01 PROCEDURE — 82330 ASSAY OF CALCIUM: CPT | Performed by: NURSE PRACTITIONER

## 2023-01-01 PROCEDURE — 82465 ASSAY BLD/SERUM CHOLESTEROL: CPT

## 2023-01-01 PROCEDURE — 80048 BASIC METABOLIC PNL TOTAL CA: CPT

## 2023-01-01 PROCEDURE — 0 MAGNESIUM SULFATE 4 GM/100ML SOLUTION: Performed by: EMERGENCY MEDICINE

## 2023-01-01 PROCEDURE — 99285 EMERGENCY DEPT VISIT HI MDM: CPT

## 2023-01-01 PROCEDURE — 25010000002 AZITHROMYCIN PER 500 MG: Performed by: EMERGENCY MEDICINE

## 2023-01-01 PROCEDURE — 82728 ASSAY OF FERRITIN: CPT

## 2023-01-01 PROCEDURE — P9041 ALBUMIN (HUMAN),5%, 50ML: HCPCS | Performed by: EMERGENCY MEDICINE

## 2023-01-01 PROCEDURE — 84478 ASSAY OF TRIGLYCERIDES: CPT

## 2023-01-01 PROCEDURE — P9016 RBC LEUKOCYTES REDUCED: HCPCS

## 2023-01-01 PROCEDURE — 99233 SBSQ HOSP IP/OBS HIGH 50: CPT

## 2023-01-01 PROCEDURE — 99222 1ST HOSP IP/OBS MODERATE 55: CPT | Performed by: INTERNAL MEDICINE

## 2023-01-01 PROCEDURE — 25010000002 METHYLPREDNISOLONE PER 125 MG: Performed by: EMERGENCY MEDICINE

## 2023-01-01 PROCEDURE — 63710000001 INSULIN REGULAR HUMAN PER 5 UNITS: Performed by: INTERNAL MEDICINE

## 2023-01-01 PROCEDURE — 84484 ASSAY OF TROPONIN QUANT: CPT | Performed by: INTERNAL MEDICINE

## 2023-01-01 PROCEDURE — P9612 CATHETERIZE FOR URINE SPEC: HCPCS

## 2023-01-01 PROCEDURE — 81001 URINALYSIS AUTO W/SCOPE: CPT | Performed by: EMERGENCY MEDICINE

## 2023-01-01 PROCEDURE — 93005 ELECTROCARDIOGRAM TRACING: CPT

## 2023-01-01 PROCEDURE — 70450 CT HEAD/BRAIN W/O DYE: CPT

## 2023-01-01 PROCEDURE — 25010000002 MORPHINE PER 10 MG

## 2023-01-01 PROCEDURE — 36430 TRANSFUSION BLD/BLD COMPNT: CPT

## 2023-01-01 PROCEDURE — 63710000001 INSULIN DETEMIR PER 5 UNITS: Performed by: INTERNAL MEDICINE

## 2023-01-01 PROCEDURE — 25010000002 MORPHINE PER 10 MG: Performed by: INTERNAL MEDICINE

## 2023-01-01 PROCEDURE — 85025 COMPLETE CBC W/AUTO DIFF WBC: CPT | Performed by: EMERGENCY MEDICINE

## 2023-01-01 PROCEDURE — 85007 BL SMEAR W/DIFF WBC COUNT: CPT

## 2023-01-01 PROCEDURE — 80202 ASSAY OF VANCOMYCIN: CPT

## 2023-01-01 PROCEDURE — C1894 INTRO/SHEATH, NON-LASER: HCPCS

## 2023-01-01 RX ORDER — METOPROLOL SUCCINATE 25 MG/1
25 TABLET, EXTENDED RELEASE ORAL ONCE
Status: COMPLETED | OUTPATIENT
Start: 2023-01-01 | End: 2023-01-01

## 2023-01-01 RX ORDER — LORAZEPAM 2 MG/ML
0.5 INJECTION INTRAMUSCULAR EVERY 4 HOURS
Status: DISCONTINUED | OUTPATIENT
Start: 2023-01-01 | End: 2023-01-01 | Stop reason: HOSPADM

## 2023-01-01 RX ORDER — VANCOMYCIN HYDROCHLORIDE 1 G/200ML
1000 INJECTION, SOLUTION INTRAVENOUS EVERY 24 HOURS
Status: DISCONTINUED | OUTPATIENT
Start: 2023-01-01 | End: 2023-01-01

## 2023-01-01 RX ORDER — IPRATROPIUM BROMIDE AND ALBUTEROL SULFATE 2.5; .5 MG/3ML; MG/3ML
3 SOLUTION RESPIRATORY (INHALATION) ONCE
Status: DISCONTINUED | OUTPATIENT
Start: 2023-01-01 | End: 2023-01-01

## 2023-01-01 RX ORDER — BUDESONIDE 0.5 MG/2ML
0.5 INHALANT ORAL
Status: CANCELLED | OUTPATIENT
Start: 2023-01-01

## 2023-01-01 RX ORDER — NICOTINE POLACRILEX 4 MG
15 LOZENGE BUCCAL
Status: DISCONTINUED | OUTPATIENT
Start: 2023-01-01 | End: 2023-01-01

## 2023-01-01 RX ORDER — METOPROLOL SUCCINATE 50 MG/1
50 TABLET, EXTENDED RELEASE ORAL DAILY
Status: DISCONTINUED | OUTPATIENT
Start: 2023-01-01 | End: 2023-01-01

## 2023-01-01 RX ORDER — PREDNISONE 20 MG/1
60 TABLET ORAL
Status: DISCONTINUED | OUTPATIENT
Start: 2023-04-02 | End: 2023-01-01

## 2023-01-01 RX ORDER — SODIUM CHLORIDE 0.9 % (FLUSH) 0.9 %
10 SYRINGE (ML) INJECTION AS NEEDED
Status: CANCELLED | OUTPATIENT
Start: 2023-01-01

## 2023-01-01 RX ORDER — HEPARIN SODIUM 1000 [USP'U]/ML
50 INJECTION, SOLUTION INTRAVENOUS; SUBCUTANEOUS AS NEEDED
Status: DISCONTINUED | OUTPATIENT
Start: 2023-01-01 | End: 2023-01-01

## 2023-01-01 RX ORDER — METHYLPREDNISOLONE SODIUM SUCCINATE 125 MG/2ML
125 INJECTION, POWDER, LYOPHILIZED, FOR SOLUTION INTRAMUSCULAR; INTRAVENOUS ONCE
Status: COMPLETED | OUTPATIENT
Start: 2023-01-01 | End: 2023-01-01

## 2023-01-01 RX ORDER — ALBUMIN, HUMAN INJ 5% 5 %
500 SOLUTION INTRAVENOUS ONCE
Status: COMPLETED | OUTPATIENT
Start: 2023-01-01 | End: 2023-01-01

## 2023-01-01 RX ORDER — METHYLPREDNISOLONE SODIUM SUCCINATE 40 MG/ML
40 INJECTION, POWDER, LYOPHILIZED, FOR SOLUTION INTRAMUSCULAR; INTRAVENOUS EVERY 12 HOURS
Status: COMPLETED | OUTPATIENT
Start: 2023-01-01 | End: 2023-01-01

## 2023-01-01 RX ORDER — MORPHINE SULFATE 4 MG/ML
4 INJECTION, SOLUTION INTRAMUSCULAR; INTRAVENOUS
Status: DISCONTINUED | OUTPATIENT
Start: 2023-01-01 | End: 2023-01-01 | Stop reason: HOSPADM

## 2023-01-01 RX ORDER — POTASSIUM CHLORIDE 7.45 MG/ML
10 INJECTION INTRAVENOUS
Status: COMPLETED | OUTPATIENT
Start: 2023-01-01 | End: 2023-01-01

## 2023-01-01 RX ORDER — ACETAMINOPHEN 160 MG/5ML
650 SOLUTION ORAL EVERY 6 HOURS PRN
Status: DISCONTINUED | OUTPATIENT
Start: 2023-01-01 | End: 2023-01-01

## 2023-01-01 RX ORDER — AMIODARONE HYDROCHLORIDE 200 MG/1
200 TABLET ORAL DAILY
Status: DISCONTINUED | OUTPATIENT
Start: 2023-04-17 | End: 2023-01-01

## 2023-01-01 RX ORDER — ACETAMINOPHEN 160 MG/5ML
650 SOLUTION ORAL EVERY 6 HOURS PRN
Status: CANCELLED | OUTPATIENT
Start: 2023-01-01

## 2023-01-01 RX ORDER — MORPHINE SULFATE 4 MG/ML
4 INJECTION, SOLUTION INTRAMUSCULAR; INTRAVENOUS ONCE
Status: COMPLETED | OUTPATIENT
Start: 2023-01-01 | End: 2023-01-01

## 2023-01-01 RX ORDER — METOPROLOL TARTRATE 5 MG/5ML
5 INJECTION INTRAVENOUS ONCE
Status: DISCONTINUED | OUTPATIENT
Start: 2023-01-01 | End: 2023-01-01

## 2023-01-01 RX ORDER — ARFORMOTEROL TARTRATE 15 UG/2ML
15 SOLUTION RESPIRATORY (INHALATION)
Status: DISCONTINUED | OUTPATIENT
Start: 2023-01-01 | End: 2023-01-01 | Stop reason: HOSPADM

## 2023-01-01 RX ORDER — SODIUM CHLORIDE FOR INHALATION 7 %
4 VIAL, NEBULIZER (ML) INHALATION
Status: DISCONTINUED | OUTPATIENT
Start: 2023-01-01 | End: 2023-01-01 | Stop reason: HOSPADM

## 2023-01-01 RX ORDER — AMIODARONE HYDROCHLORIDE 200 MG/1
200 TABLET ORAL EVERY 12 HOURS SCHEDULED
Status: DISCONTINUED | OUTPATIENT
Start: 2023-01-01 | End: 2023-01-01

## 2023-01-01 RX ORDER — METOPROLOL TARTRATE 5 MG/5ML
5 INJECTION INTRAVENOUS ONCE
Status: COMPLETED | OUTPATIENT
Start: 2023-01-01 | End: 2023-01-01

## 2023-01-01 RX ORDER — METHYLPREDNISOLONE SODIUM SUCCINATE 40 MG/ML
20 INJECTION, POWDER, LYOPHILIZED, FOR SOLUTION INTRAMUSCULAR; INTRAVENOUS EVERY 12 HOURS
Status: DISCONTINUED | OUTPATIENT
Start: 2023-01-01 | End: 2023-01-01

## 2023-01-01 RX ORDER — SODIUM CHLORIDE FOR INHALATION 7 %
4 VIAL, NEBULIZER (ML) INHALATION
Status: CANCELLED | OUTPATIENT
Start: 2023-01-01 | End: 2023-01-01

## 2023-01-01 RX ORDER — PANTOPRAZOLE SODIUM 40 MG/1
40 TABLET, DELAYED RELEASE ORAL
Status: DISCONTINUED | OUTPATIENT
Start: 2023-01-01 | End: 2023-01-01

## 2023-01-01 RX ORDER — PANTOPRAZOLE SODIUM 40 MG/10ML
40 INJECTION, POWDER, LYOPHILIZED, FOR SOLUTION INTRAVENOUS
Status: CANCELLED | OUTPATIENT
Start: 2023-01-01

## 2023-01-01 RX ORDER — KETOROLAC TROMETHAMINE 15 MG/ML
15 INJECTION, SOLUTION INTRAMUSCULAR; INTRAVENOUS EVERY 6 HOURS PRN
Status: DISCONTINUED | OUTPATIENT
Start: 2023-01-01 | End: 2023-01-01 | Stop reason: HOSPADM

## 2023-01-01 RX ORDER — AMIODARONE HYDROCHLORIDE 200 MG/1
200 TABLET ORAL EVERY 8 HOURS
Status: DISCONTINUED | OUTPATIENT
Start: 2023-01-01 | End: 2023-01-01

## 2023-01-01 RX ORDER — INSULIN LISPRO 100 [IU]/ML
0-7 INJECTION, SOLUTION INTRAVENOUS; SUBCUTANEOUS
Status: DISCONTINUED | OUTPATIENT
Start: 2023-01-01 | End: 2023-01-01

## 2023-01-01 RX ORDER — IBUPROFEN 600 MG/1
1 TABLET ORAL
Status: DISCONTINUED | OUTPATIENT
Start: 2023-01-01 | End: 2023-01-01

## 2023-01-01 RX ORDER — ARFORMOTEROL TARTRATE 15 UG/2ML
15 SOLUTION RESPIRATORY (INHALATION)
Status: CANCELLED | OUTPATIENT
Start: 2023-01-01

## 2023-01-01 RX ORDER — MAGNESIUM SULFATE HEPTAHYDRATE 40 MG/ML
2 INJECTION, SOLUTION INTRAVENOUS ONCE
Status: COMPLETED | OUTPATIENT
Start: 2023-01-01 | End: 2023-01-01

## 2023-01-01 RX ORDER — HEPARIN SODIUM 1000 [USP'U]/ML
25 INJECTION, SOLUTION INTRAVENOUS; SUBCUTANEOUS AS NEEDED
Status: DISCONTINUED | OUTPATIENT
Start: 2023-01-01 | End: 2023-01-01

## 2023-01-01 RX ORDER — METOCLOPRAMIDE HYDROCHLORIDE 5 MG/ML
10 INJECTION INTRAMUSCULAR; INTRAVENOUS ONCE
Status: COMPLETED | OUTPATIENT
Start: 2023-01-01 | End: 2023-01-01

## 2023-01-01 RX ORDER — ONDANSETRON 4 MG/1
4 TABLET, FILM COATED ORAL EVERY 6 HOURS PRN
Status: DISCONTINUED | OUTPATIENT
Start: 2023-01-01 | End: 2023-01-01

## 2023-01-01 RX ORDER — IPRATROPIUM BROMIDE AND ALBUTEROL SULFATE 2.5; .5 MG/3ML; MG/3ML
3 SOLUTION RESPIRATORY (INHALATION) EVERY 4 HOURS PRN
Status: CANCELLED | OUTPATIENT
Start: 2023-01-01

## 2023-01-01 RX ORDER — LORAZEPAM 2 MG/ML
0.25 INJECTION INTRAMUSCULAR ONCE
Status: COMPLETED | OUTPATIENT
Start: 2023-01-01 | End: 2023-01-01

## 2023-01-01 RX ORDER — METOPROLOL TARTRATE 5 MG/5ML
5 INJECTION INTRAVENOUS
Status: COMPLETED | OUTPATIENT
Start: 2023-01-01 | End: 2023-01-01

## 2023-01-01 RX ORDER — LORAZEPAM 2 MG/ML
1 INJECTION INTRAMUSCULAR ONCE
Status: COMPLETED | OUTPATIENT
Start: 2023-01-01 | End: 2023-01-01

## 2023-01-01 RX ORDER — ACETAMINOPHEN 325 MG/1
650 TABLET ORAL EVERY 4 HOURS PRN
Status: DISCONTINUED | OUTPATIENT
Start: 2023-01-01 | End: 2023-01-01

## 2023-01-01 RX ORDER — GLYCOPYRROLATE 0.2 MG/ML
0.4 INJECTION INTRAMUSCULAR; INTRAVENOUS EVERY 4 HOURS PRN
Status: CANCELLED | OUTPATIENT
Start: 2023-01-01

## 2023-01-01 RX ORDER — MORPHINE SULFATE 2 MG/ML
2 INJECTION, SOLUTION INTRAMUSCULAR; INTRAVENOUS
Status: DISCONTINUED | OUTPATIENT
Start: 2023-01-01 | End: 2023-01-01 | Stop reason: HOSPADM

## 2023-01-01 RX ORDER — BISACODYL 10 MG
10 SUPPOSITORY, RECTAL RECTAL DAILY PRN
Status: DISCONTINUED | OUTPATIENT
Start: 2023-01-01 | End: 2023-01-01 | Stop reason: HOSPADM

## 2023-01-01 RX ORDER — POLYVINYL ALCOHOL 14 MG/ML
1 SOLUTION/ DROPS OPHTHALMIC
Status: DISCONTINUED | OUTPATIENT
Start: 2023-01-01 | End: 2023-01-01 | Stop reason: HOSPADM

## 2023-01-01 RX ORDER — SODIUM CHLORIDE 0.9 % (FLUSH) 0.9 %
10 SYRINGE (ML) INJECTION AS NEEDED
Status: DISCONTINUED | OUTPATIENT
Start: 2023-01-01 | End: 2023-01-01 | Stop reason: HOSPADM

## 2023-01-01 RX ORDER — LORAZEPAM 2 MG/ML
0.5 INJECTION INTRAMUSCULAR EVERY 4 HOURS PRN
Status: DISCONTINUED | OUTPATIENT
Start: 2023-01-01 | End: 2023-01-01

## 2023-01-01 RX ORDER — METHYLPREDNISOLONE SODIUM SUCCINATE 40 MG/ML
40 INJECTION, POWDER, LYOPHILIZED, FOR SOLUTION INTRAMUSCULAR; INTRAVENOUS EVERY 8 HOURS
Status: DISCONTINUED | OUTPATIENT
Start: 2023-01-01 | End: 2023-01-01

## 2023-01-01 RX ORDER — SERTRALINE HYDROCHLORIDE 100 MG/1
100 TABLET, FILM COATED ORAL DAILY
Status: DISCONTINUED | OUTPATIENT
Start: 2023-01-01 | End: 2023-01-01

## 2023-01-01 RX ORDER — DEXTROSE MONOHYDRATE 25 G/50ML
25 INJECTION, SOLUTION INTRAVENOUS
Status: DISCONTINUED | OUTPATIENT
Start: 2023-01-01 | End: 2023-01-01

## 2023-01-01 RX ORDER — GLYCOPYRROLATE 0.2 MG/ML
0.4 INJECTION INTRAMUSCULAR; INTRAVENOUS EVERY 4 HOURS PRN
Status: DISCONTINUED | OUTPATIENT
Start: 2023-01-01 | End: 2023-01-01 | Stop reason: HOSPADM

## 2023-01-01 RX ORDER — HEPARIN SODIUM 5000 [USP'U]/ML
5000 INJECTION, SOLUTION INTRAVENOUS; SUBCUTANEOUS EVERY 8 HOURS SCHEDULED
Status: DISCONTINUED | OUTPATIENT
Start: 2023-01-01 | End: 2023-01-01

## 2023-01-01 RX ORDER — ACETAMINOPHEN 160 MG/5ML
650 SOLUTION ORAL EVERY 6 HOURS PRN
Status: DISCONTINUED | OUTPATIENT
Start: 2023-01-01 | End: 2023-01-01 | Stop reason: HOSPADM

## 2023-01-01 RX ORDER — AMIODARONE HYDROCHLORIDE 200 MG/1
200 TABLET ORAL 3 TIMES DAILY
Status: DISCONTINUED | OUTPATIENT
Start: 2023-01-01 | End: 2023-01-01

## 2023-01-01 RX ORDER — SODIUM CHLORIDE 9 MG/ML
75 INJECTION, SOLUTION INTRAVENOUS CONTINUOUS
Status: DISCONTINUED | OUTPATIENT
Start: 2023-01-01 | End: 2023-01-01

## 2023-01-01 RX ORDER — SODIUM CHLORIDE 0.9 % (FLUSH) 0.9 %
20 SYRINGE (ML) INJECTION AS NEEDED
Status: DISCONTINUED | OUTPATIENT
Start: 2023-01-01 | End: 2023-01-01

## 2023-01-01 RX ORDER — IPRATROPIUM BROMIDE AND ALBUTEROL SULFATE 2.5; .5 MG/3ML; MG/3ML
3 SOLUTION RESPIRATORY (INHALATION) EVERY 4 HOURS PRN
Status: DISCONTINUED | OUTPATIENT
Start: 2023-01-01 | End: 2023-01-01 | Stop reason: HOSPADM

## 2023-01-01 RX ORDER — HEPARIN SODIUM 10000 [USP'U]/100ML
12 INJECTION, SOLUTION INTRAVENOUS
Status: DISCONTINUED | OUTPATIENT
Start: 2023-01-01 | End: 2023-01-01

## 2023-01-01 RX ORDER — PANTOPRAZOLE SODIUM 40 MG/10ML
40 INJECTION, POWDER, LYOPHILIZED, FOR SOLUTION INTRAVENOUS
Status: DISCONTINUED | OUTPATIENT
Start: 2023-01-01 | End: 2023-01-01

## 2023-01-01 RX ORDER — AMIODARONE HYDROCHLORIDE 200 MG/1
200 TABLET ORAL EVERY 12 HOURS
Status: DISCONTINUED | OUTPATIENT
Start: 2023-04-02 | End: 2023-01-01

## 2023-01-01 RX ORDER — PREDNISONE 20 MG/1
40 TABLET ORAL 2 TIMES DAILY WITH MEALS
Status: DISCONTINUED | OUTPATIENT
Start: 2023-01-01 | End: 2023-01-01

## 2023-01-01 RX ORDER — LORAZEPAM 2 MG/ML
0.5 INJECTION INTRAMUSCULAR EVERY 4 HOURS PRN
Status: DISCONTINUED | OUTPATIENT
Start: 2023-01-01 | End: 2023-01-01 | Stop reason: HOSPADM

## 2023-01-01 RX ORDER — SODIUM CHLORIDE 0.9 % (FLUSH) 0.9 %
10 SYRINGE (ML) INJECTION EVERY 12 HOURS SCHEDULED
Status: CANCELLED | OUTPATIENT
Start: 2023-01-01

## 2023-01-01 RX ORDER — ACETAMINOPHEN 650 MG/1
650 SUPPOSITORY RECTAL EVERY 4 HOURS PRN
Status: DISCONTINUED | OUTPATIENT
Start: 2023-01-01 | End: 2023-01-01 | Stop reason: HOSPADM

## 2023-01-01 RX ORDER — METOPROLOL SUCCINATE 25 MG/1
25 TABLET, EXTENDED RELEASE ORAL DAILY
Status: DISCONTINUED | OUTPATIENT
Start: 2023-01-01 | End: 2023-01-01

## 2023-01-01 RX ORDER — MAGNESIUM SULFATE HEPTAHYDRATE 40 MG/ML
4 INJECTION, SOLUTION INTRAVENOUS ONCE
Status: COMPLETED | OUTPATIENT
Start: 2023-01-01 | End: 2023-01-01

## 2023-01-01 RX ORDER — ONDANSETRON 2 MG/ML
4 INJECTION INTRAMUSCULAR; INTRAVENOUS EVERY 6 HOURS PRN
Status: DISCONTINUED | OUTPATIENT
Start: 2023-01-01 | End: 2023-01-01 | Stop reason: HOSPADM

## 2023-01-01 RX ORDER — PANTOPRAZOLE SODIUM 40 MG/10ML
40 INJECTION, POWDER, LYOPHILIZED, FOR SOLUTION INTRAVENOUS
Status: DISCONTINUED | OUTPATIENT
Start: 2023-01-01 | End: 2023-01-01 | Stop reason: HOSPADM

## 2023-01-01 RX ORDER — POTASSIUM CHLORIDE 1.5 G/1.77G
40 POWDER, FOR SOLUTION ORAL EVERY 4 HOURS
Status: COMPLETED | OUTPATIENT
Start: 2023-01-01 | End: 2023-01-01

## 2023-01-01 RX ORDER — SCOLOPAMINE TRANSDERMAL SYSTEM 1 MG/1
1 PATCH, EXTENDED RELEASE TRANSDERMAL
Status: DISCONTINUED | OUTPATIENT
Start: 2023-01-01 | End: 2023-01-01 | Stop reason: HOSPADM

## 2023-01-01 RX ORDER — SODIUM CHLORIDE 0.9 % (FLUSH) 0.9 %
10 SYRINGE (ML) INJECTION AS NEEDED
Status: DISCONTINUED | OUTPATIENT
Start: 2023-01-01 | End: 2023-01-01

## 2023-01-01 RX ORDER — SODIUM CHLORIDE 9 MG/ML
40 INJECTION, SOLUTION INTRAVENOUS AS NEEDED
Status: DISCONTINUED | OUTPATIENT
Start: 2023-01-01 | End: 2023-01-01

## 2023-01-01 RX ORDER — BUDESONIDE 0.5 MG/2ML
0.5 INHALANT ORAL
Status: DISCONTINUED | OUTPATIENT
Start: 2023-01-01 | End: 2023-01-01 | Stop reason: HOSPADM

## 2023-01-01 RX ORDER — METOCLOPRAMIDE HYDROCHLORIDE 5 MG/ML
10 INJECTION INTRAMUSCULAR; INTRAVENOUS EVERY 6 HOURS
Status: DISCONTINUED | OUTPATIENT
Start: 2023-01-01 | End: 2023-01-01

## 2023-01-01 RX ORDER — MORPHINE SULFATE 4 MG/ML
4 INJECTION, SOLUTION INTRAMUSCULAR; INTRAVENOUS EVERY 4 HOURS
Status: DISCONTINUED | OUTPATIENT
Start: 2023-01-01 | End: 2023-01-01 | Stop reason: HOSPADM

## 2023-01-01 RX ORDER — IPRATROPIUM BROMIDE AND ALBUTEROL SULFATE 2.5; .5 MG/3ML; MG/3ML
3 SOLUTION RESPIRATORY (INHALATION)
Status: DISCONTINUED | OUTPATIENT
Start: 2023-01-01 | End: 2023-01-01

## 2023-01-01 RX ORDER — LORAZEPAM 2 MG/ML
0.5 INJECTION INTRAMUSCULAR EVERY 4 HOURS PRN
Status: CANCELLED | OUTPATIENT
Start: 2023-01-01 | End: 2023-04-07

## 2023-01-01 RX ORDER — ACETAMINOPHEN 160 MG/5ML
650 SOLUTION ORAL EVERY 4 HOURS PRN
Status: DISCONTINUED | OUTPATIENT
Start: 2023-01-01 | End: 2023-01-01

## 2023-01-01 RX ORDER — AMIODARONE HYDROCHLORIDE 200 MG/1
200 TABLET ORAL ONCE
Status: DISCONTINUED | OUTPATIENT
Start: 2023-01-01 | End: 2023-01-01

## 2023-01-01 RX ORDER — LABETALOL HYDROCHLORIDE 5 MG/ML
20 INJECTION, SOLUTION INTRAVENOUS EVERY 4 HOURS PRN
Status: DISCONTINUED | OUTPATIENT
Start: 2023-01-01 | End: 2023-01-01

## 2023-01-01 RX ORDER — MORPHINE SULFATE 2 MG/ML
2 INJECTION, SOLUTION INTRAMUSCULAR; INTRAVENOUS
Status: DISCONTINUED | OUTPATIENT
Start: 2023-01-01 | End: 2023-01-01

## 2023-01-01 RX ORDER — LORAZEPAM 2 MG/ML
1 INJECTION INTRAMUSCULAR
Status: DISCONTINUED | OUTPATIENT
Start: 2023-01-01 | End: 2023-01-01 | Stop reason: HOSPADM

## 2023-01-01 RX ORDER — AMIODARONE HYDROCHLORIDE 200 MG/1
200 TABLET ORAL EVERY 12 HOURS
Status: DISCONTINUED | OUTPATIENT
Start: 2023-04-03 | End: 2023-01-01

## 2023-01-01 RX ORDER — MORPHINE SULFATE 2 MG/ML
2 INJECTION, SOLUTION INTRAMUSCULAR; INTRAVENOUS
Status: CANCELLED | OUTPATIENT
Start: 2023-01-01 | End: 2023-04-07

## 2023-01-01 RX ORDER — SODIUM CHLORIDE 0.9 % (FLUSH) 0.9 %
10 SYRINGE (ML) INJECTION EVERY 12 HOURS SCHEDULED
Status: DISCONTINUED | OUTPATIENT
Start: 2023-01-01 | End: 2023-01-01 | Stop reason: HOSPADM

## 2023-01-01 RX ADMIN — IPRATROPIUM BROMIDE AND ALBUTEROL SULFATE 3 ML: 2.5; .5 SOLUTION RESPIRATORY (INHALATION) at 03:51

## 2023-01-01 RX ADMIN — ACETAMINOPHEN 650 MG: 650 SOLUTION ORAL at 15:27

## 2023-01-01 RX ADMIN — AMIODARONE HYDROCHLORIDE 200 MG: 200 TABLET ORAL at 19:53

## 2023-01-01 RX ADMIN — BUDESONIDE 0.5 MG: 0.5 SUSPENSION RESPIRATORY (INHALATION) at 07:25

## 2023-01-01 RX ADMIN — METHYLPREDNISOLONE SODIUM SUCCINATE 40 MG: 40 INJECTION, POWDER, FOR SOLUTION INTRAMUSCULAR; INTRAVENOUS at 17:38

## 2023-01-01 RX ADMIN — HEPARIN SODIUM 5000 UNITS: 5000 INJECTION INTRAVENOUS; SUBCUTANEOUS at 05:27

## 2023-01-01 RX ADMIN — INSULIN HUMAN 2 UNITS: 100 INJECTION, SOLUTION PARENTERAL at 12:53

## 2023-01-01 RX ADMIN — IPRATROPIUM BROMIDE AND ALBUTEROL SULFATE 3 ML: 2.5; .5 SOLUTION RESPIRATORY (INHALATION) at 12:21

## 2023-01-01 RX ADMIN — METOPROLOL TARTRATE 5 MG: 5 INJECTION INTRAVENOUS at 19:16

## 2023-01-01 RX ADMIN — ALBUMIN (HUMAN) 500 ML: 12.5 INJECTION, SOLUTION INTRAVENOUS at 01:15

## 2023-01-01 RX ADMIN — MORPHINE SULFATE 4 MG: 4 INJECTION, SOLUTION INTRAMUSCULAR; INTRAVENOUS at 00:33

## 2023-01-01 RX ADMIN — SERTRALINE 100 MG: 100 TABLET, FILM COATED ORAL at 08:27

## 2023-01-01 RX ADMIN — MORPHINE SULFATE 4 MG: 4 INJECTION, SOLUTION INTRAMUSCULAR; INTRAVENOUS at 21:14

## 2023-01-01 RX ADMIN — POTASSIUM CHLORIDE 10 MEQ: 7.46 INJECTION, SOLUTION INTRAVENOUS at 04:01

## 2023-01-01 RX ADMIN — METOCLOPRAMIDE 10 MG: 5 INJECTION, SOLUTION INTRAMUSCULAR; INTRAVENOUS at 22:34

## 2023-01-01 RX ADMIN — METOPROLOL TARTRATE 25 MG: 25 TABLET, FILM COATED ORAL at 19:53

## 2023-01-01 RX ADMIN — Medication 10 ML: at 22:17

## 2023-01-01 RX ADMIN — PANTOPRAZOLE SODIUM 40 MG: 40 INJECTION, POWDER, LYOPHILIZED, FOR SOLUTION INTRAVENOUS at 05:37

## 2023-01-01 RX ADMIN — IPRATROPIUM BROMIDE AND ALBUTEROL SULFATE 3 ML: 2.5; .5 SOLUTION RESPIRATORY (INHALATION) at 00:05

## 2023-01-01 RX ADMIN — HEPARIN SODIUM 5000 UNITS: 5000 INJECTION INTRAVENOUS; SUBCUTANEOUS at 05:31

## 2023-01-01 RX ADMIN — IPRATROPIUM BROMIDE AND ALBUTEROL SULFATE 3 ML: 2.5; .5 SOLUTION RESPIRATORY (INHALATION) at 23:10

## 2023-01-01 RX ADMIN — MAGNESIUM SULFATE HEPTAHYDRATE 4 G: 40 INJECTION, SOLUTION INTRAVENOUS at 12:25

## 2023-01-01 RX ADMIN — MAGNESIUM SULFATE HEPTAHYDRATE 2 G: 2 INJECTION, SOLUTION INTRAVENOUS at 00:57

## 2023-01-01 RX ADMIN — METHYLPREDNISOLONE SODIUM SUCCINATE 40 MG: 40 INJECTION, POWDER, FOR SOLUTION INTRAMUSCULAR; INTRAVENOUS at 22:35

## 2023-01-01 RX ADMIN — AMIODARONE HYDROCHLORIDE 1 MG/MIN: 1.8 INJECTION, SOLUTION INTRAVENOUS at 22:06

## 2023-01-01 RX ADMIN — METOPROLOL TARTRATE 25 MG: 25 TABLET, FILM COATED ORAL at 08:27

## 2023-01-01 RX ADMIN — TAZOBACTAM SODIUM AND PIPERACILLIN SODIUM 3.38 G: 375; 3 INJECTION, SOLUTION INTRAVENOUS at 05:46

## 2023-01-01 RX ADMIN — INSULIN HUMAN 2 UNITS: 100 INJECTION, SOLUTION PARENTERAL at 12:02

## 2023-01-01 RX ADMIN — AMIODARONE HYDROCHLORIDE 200 MG: 200 TABLET ORAL at 15:06

## 2023-01-01 RX ADMIN — METHYLPREDNISOLONE SODIUM SUCCINATE 125 MG: 125 INJECTION, POWDER, FOR SOLUTION INTRAMUSCULAR; INTRAVENOUS at 21:44

## 2023-01-01 RX ADMIN — INSULIN HUMAN 4 UNITS: 100 INJECTION, SOLUTION PARENTERAL at 13:10

## 2023-01-01 RX ADMIN — IPRATROPIUM BROMIDE AND ALBUTEROL SULFATE 3 ML: 2.5; .5 SOLUTION RESPIRATORY (INHALATION) at 19:58

## 2023-01-01 RX ADMIN — HEPARIN SODIUM 5000 UNITS: 5000 INJECTION INTRAVENOUS; SUBCUTANEOUS at 05:24

## 2023-01-01 RX ADMIN — INSULIN DETEMIR 6 UNITS: 100 INJECTION, SOLUTION SUBCUTANEOUS at 06:38

## 2023-01-01 RX ADMIN — IPRATROPIUM BROMIDE AND ALBUTEROL SULFATE 3 ML: 2.5; .5 SOLUTION RESPIRATORY (INHALATION) at 03:23

## 2023-01-01 RX ADMIN — TAZOBACTAM SODIUM AND PIPERACILLIN SODIUM 3.38 G: 375; 3 INJECTION, SOLUTION INTRAVENOUS at 22:32

## 2023-01-01 RX ADMIN — TAZOBACTAM SODIUM AND PIPERACILLIN SODIUM 3.38 G: 375; 3 INJECTION, SOLUTION INTRAVENOUS at 21:15

## 2023-01-01 RX ADMIN — HEPARIN SODIUM 5000 UNITS: 5000 INJECTION INTRAVENOUS; SUBCUTANEOUS at 21:16

## 2023-01-01 RX ADMIN — TAZOBACTAM SODIUM AND PIPERACILLIN SODIUM 3.38 G: 375; 3 INJECTION, SOLUTION INTRAVENOUS at 05:26

## 2023-01-01 RX ADMIN — BUDESONIDE 0.5 MG: 0.5 SUSPENSION RESPIRATORY (INHALATION) at 20:29

## 2023-01-01 RX ADMIN — METOPROLOL TARTRATE 5 MG: 5 INJECTION INTRAVENOUS at 23:34

## 2023-01-01 RX ADMIN — IPRATROPIUM BROMIDE AND ALBUTEROL SULFATE 3 ML: 2.5; .5 SOLUTION RESPIRATORY (INHALATION) at 06:31

## 2023-01-01 RX ADMIN — HEPARIN SODIUM 5000 UNITS: 5000 INJECTION INTRAVENOUS; SUBCUTANEOUS at 06:29

## 2023-01-01 RX ADMIN — METHYLPREDNISOLONE SODIUM SUCCINATE 40 MG: 40 INJECTION, POWDER, FOR SOLUTION INTRAMUSCULAR; INTRAVENOUS at 05:24

## 2023-01-01 RX ADMIN — Medication 10 ML: at 10:51

## 2023-01-01 RX ADMIN — INSULIN HUMAN 2 UNITS: 100 INJECTION, SOLUTION PARENTERAL at 17:38

## 2023-01-01 RX ADMIN — ARFORMOTEROL TARTRATE 15 MCG: 15 SOLUTION RESPIRATORY (INHALATION) at 09:27

## 2023-01-01 RX ADMIN — Medication 10 ML: at 20:34

## 2023-01-01 RX ADMIN — BUDESONIDE 0.5 MG: 0.5 SUSPENSION RESPIRATORY (INHALATION) at 08:12

## 2023-01-01 RX ADMIN — IPRATROPIUM BROMIDE AND ALBUTEROL SULFATE 3 ML: 2.5; .5 SOLUTION RESPIRATORY (INHALATION) at 00:04

## 2023-01-01 RX ADMIN — AMIODARONE HYDROCHLORIDE 200 MG: 200 TABLET ORAL at 21:26

## 2023-01-01 RX ADMIN — Medication 10 ML: at 19:33

## 2023-01-01 RX ADMIN — BUDESONIDE 0.5 MG: 0.5 SUSPENSION RESPIRATORY (INHALATION) at 09:27

## 2023-01-01 RX ADMIN — TAZOBACTAM SODIUM AND PIPERACILLIN SODIUM 3.38 G: 375; 3 INJECTION, SOLUTION INTRAVENOUS at 05:01

## 2023-01-01 RX ADMIN — BUDESONIDE 0.5 MG: 0.5 SUSPENSION RESPIRATORY (INHALATION) at 20:10

## 2023-01-01 RX ADMIN — INSULIN DETEMIR 10 UNITS: 100 INJECTION, SOLUTION SUBCUTANEOUS at 08:37

## 2023-01-01 RX ADMIN — Medication 10 ML: at 08:57

## 2023-01-01 RX ADMIN — Medication 10 ML: at 21:24

## 2023-01-01 RX ADMIN — HEPARIN SODIUM 5000 UNITS: 5000 INJECTION INTRAVENOUS; SUBCUTANEOUS at 13:26

## 2023-01-01 RX ADMIN — METOPROLOL SUCCINATE 50 MG: 50 TABLET, EXTENDED RELEASE ORAL at 09:53

## 2023-01-01 RX ADMIN — AZITHROMYCIN 500 MG: 500 INJECTION, POWDER, LYOPHILIZED, FOR SOLUTION INTRAVENOUS at 23:33

## 2023-01-01 RX ADMIN — HEPARIN SODIUM 5000 UNITS: 5000 INJECTION INTRAVENOUS; SUBCUTANEOUS at 19:59

## 2023-01-01 RX ADMIN — TAZOBACTAM SODIUM AND PIPERACILLIN SODIUM 3.38 G: 375; 3 INJECTION, SOLUTION INTRAVENOUS at 21:14

## 2023-01-01 RX ADMIN — PANTOPRAZOLE SODIUM 40 MG: 40 TABLET, DELAYED RELEASE ORAL at 05:07

## 2023-01-01 RX ADMIN — MORPHINE SULFATE 2 MG: 2 INJECTION, SOLUTION INTRAMUSCULAR; INTRAVENOUS at 14:28

## 2023-01-01 RX ADMIN — HYDROGEN PEROXIDE: 3 LIQUID TOPICAL at 22:42

## 2023-01-01 RX ADMIN — METOPROLOL TARTRATE 25 MG: 25 TABLET, FILM COATED ORAL at 21:26

## 2023-01-01 RX ADMIN — AMIODARONE HYDROCHLORIDE 150 MG: 1.5 INJECTION, SOLUTION INTRAVENOUS at 16:06

## 2023-01-01 RX ADMIN — TAZOBACTAM SODIUM AND PIPERACILLIN SODIUM 3.38 G: 375; 3 INJECTION, SOLUTION INTRAVENOUS at 21:27

## 2023-01-01 RX ADMIN — HEPARIN SODIUM 5000 UNITS: 5000 INJECTION INTRAVENOUS; SUBCUTANEOUS at 21:23

## 2023-01-01 RX ADMIN — PANTOPRAZOLE SODIUM 40 MG: 40 INJECTION, POWDER, LYOPHILIZED, FOR SOLUTION INTRAVENOUS at 06:11

## 2023-01-01 RX ADMIN — ACETAMINOPHEN 650 MG: 650 SOLUTION ORAL at 20:08

## 2023-01-01 RX ADMIN — METHYLPREDNISOLONE SODIUM SUCCINATE 40 MG: 40 INJECTION, POWDER, FOR SOLUTION INTRAMUSCULAR; INTRAVENOUS at 05:31

## 2023-01-01 RX ADMIN — ACETAMINOPHEN 650 MG: 650 SOLUTION ORAL at 21:31

## 2023-01-01 RX ADMIN — POTASSIUM & SODIUM PHOSPHATES POWDER PACK 280-160-250 MG 2 PACKET: 280-160-250 PACK at 06:38

## 2023-01-01 RX ADMIN — TAZOBACTAM SODIUM AND PIPERACILLIN SODIUM 3.38 G: 375; 3 INJECTION, SOLUTION INTRAVENOUS at 14:09

## 2023-01-01 RX ADMIN — BUDESONIDE 0.5 MG: 0.5 SUSPENSION RESPIRATORY (INHALATION) at 07:53

## 2023-01-01 RX ADMIN — IPRATROPIUM BROMIDE AND ALBUTEROL SULFATE 3 ML: 2.5; .5 SOLUTION RESPIRATORY (INHALATION) at 18:43

## 2023-01-01 RX ADMIN — IPRATROPIUM BROMIDE AND ALBUTEROL SULFATE 3 ML: 2.5; .5 SOLUTION RESPIRATORY (INHALATION) at 23:13

## 2023-01-01 RX ADMIN — GLYCOPYRROLATE 0.4 MG: 0.2 INJECTION INTRAMUSCULAR; INTRAVENOUS at 14:27

## 2023-01-01 RX ADMIN — HEPARIN SODIUM 5000 UNITS: 5000 INJECTION INTRAVENOUS; SUBCUTANEOUS at 05:06

## 2023-01-01 RX ADMIN — PANTOPRAZOLE SODIUM 40 MG: 40 INJECTION, POWDER, LYOPHILIZED, FOR SOLUTION INTRAVENOUS at 05:24

## 2023-01-01 RX ADMIN — AMIODARONE HYDROCHLORIDE 1 MG/MIN: 1.8 INJECTION, SOLUTION INTRAVENOUS at 20:58

## 2023-01-01 RX ADMIN — INSULIN HUMAN 2 UNITS: 100 INJECTION, SOLUTION PARENTERAL at 23:41

## 2023-01-01 RX ADMIN — BUDESONIDE 0.5 MG: 0.5 SUSPENSION RESPIRATORY (INHALATION) at 01:26

## 2023-01-01 RX ADMIN — PANTOPRAZOLE SODIUM 40 MG: 40 TABLET, DELAYED RELEASE ORAL at 05:33

## 2023-01-01 RX ADMIN — HEPARIN SODIUM 5000 UNITS: 5000 INJECTION INTRAVENOUS; SUBCUTANEOUS at 21:14

## 2023-01-01 RX ADMIN — METOPROLOL TARTRATE 5 MG: 5 INJECTION INTRAVENOUS at 15:46

## 2023-01-01 RX ADMIN — INSULIN HUMAN 2 UNITS: 100 INJECTION, SOLUTION PARENTERAL at 00:56

## 2023-01-01 RX ADMIN — PANTOPRAZOLE SODIUM 40 MG: 40 INJECTION, POWDER, LYOPHILIZED, FOR SOLUTION INTRAVENOUS at 05:00

## 2023-01-01 RX ADMIN — METOPROLOL SUCCINATE 25 MG: 25 TABLET, EXTENDED RELEASE ORAL at 17:17

## 2023-01-01 RX ADMIN — LABETALOL HYDROCHLORIDE 20 MG: 5 INJECTION, SOLUTION INTRAVENOUS at 13:26

## 2023-01-01 RX ADMIN — METOPROLOL SUCCINATE 50 MG: 50 TABLET, EXTENDED RELEASE ORAL at 08:15

## 2023-01-01 RX ADMIN — HEPARIN SODIUM 5000 UNITS: 5000 INJECTION INTRAVENOUS; SUBCUTANEOUS at 05:00

## 2023-01-01 RX ADMIN — MORPHINE SULFATE 4 MG: 4 INJECTION, SOLUTION INTRAMUSCULAR; INTRAVENOUS at 16:27

## 2023-01-01 RX ADMIN — INSULIN HUMAN 3 UNITS: 100 INJECTION, SOLUTION PARENTERAL at 05:45

## 2023-01-01 RX ADMIN — IPRATROPIUM BROMIDE AND ALBUTEROL SULFATE 3 ML: 2.5; .5 SOLUTION RESPIRATORY (INHALATION) at 12:09

## 2023-01-01 RX ADMIN — SODIUM CHLORIDE 75 ML/HR: 9 INJECTION, SOLUTION INTRAVENOUS at 08:56

## 2023-01-01 RX ADMIN — METOPROLOL TARTRATE 5 MG: 5 INJECTION INTRAVENOUS at 22:12

## 2023-01-01 RX ADMIN — IPRATROPIUM BROMIDE AND ALBUTEROL SULFATE 3 ML: 2.5; .5 SOLUTION RESPIRATORY (INHALATION) at 15:06

## 2023-01-01 RX ADMIN — IPRATROPIUM BROMIDE AND ALBUTEROL SULFATE 3 ML: 2.5; .5 SOLUTION RESPIRATORY (INHALATION) at 08:28

## 2023-01-01 RX ADMIN — INSULIN HUMAN 2 UNITS: 100 INJECTION, SOLUTION PARENTERAL at 13:00

## 2023-01-01 RX ADMIN — SODIUM CHLORIDE 75 ML/HR: 9 INJECTION, SOLUTION INTRAVENOUS at 17:31

## 2023-01-01 RX ADMIN — METHYLPREDNISOLONE SODIUM SUCCINATE 40 MG: 40 INJECTION, POWDER, FOR SOLUTION INTRAMUSCULAR; INTRAVENOUS at 21:23

## 2023-01-01 RX ADMIN — METHYLPREDNISOLONE SODIUM SUCCINATE 20 MG: 40 INJECTION, POWDER, FOR SOLUTION INTRAMUSCULAR; INTRAVENOUS at 11:31

## 2023-01-01 RX ADMIN — BUDESONIDE 0.5 MG: 0.5 SUSPENSION RESPIRATORY (INHALATION) at 19:42

## 2023-01-01 RX ADMIN — HEPARIN SODIUM 5000 UNITS: 5000 INJECTION INTRAVENOUS; SUBCUTANEOUS at 05:37

## 2023-01-01 RX ADMIN — METHYLPREDNISOLONE SODIUM SUCCINATE 40 MG: 40 INJECTION, POWDER, FOR SOLUTION INTRAMUSCULAR; INTRAVENOUS at 14:08

## 2023-01-01 RX ADMIN — BUDESONIDE 0.5 MG: 0.5 SUSPENSION RESPIRATORY (INHALATION) at 19:55

## 2023-01-01 RX ADMIN — INSULIN HUMAN 2 UNITS: 100 INJECTION, SOLUTION PARENTERAL at 17:45

## 2023-01-01 RX ADMIN — IPRATROPIUM BROMIDE AND ALBUTEROL SULFATE 3 ML: 2.5; .5 SOLUTION RESPIRATORY (INHALATION) at 15:41

## 2023-01-01 RX ADMIN — SERTRALINE 100 MG: 100 TABLET, FILM COATED ORAL at 12:27

## 2023-01-01 RX ADMIN — INSULIN HUMAN 3 UNITS: 100 INJECTION, SOLUTION PARENTERAL at 00:22

## 2023-01-01 RX ADMIN — IPRATROPIUM BROMIDE AND ALBUTEROL SULFATE 3 ML: 2.5; .5 SOLUTION RESPIRATORY (INHALATION) at 11:36

## 2023-01-01 RX ADMIN — AMIODARONE HYDROCHLORIDE 200 MG: 200 TABLET ORAL at 17:36

## 2023-01-01 RX ADMIN — METOPROLOL SUCCINATE 25 MG: 25 TABLET, EXTENDED RELEASE ORAL at 12:27

## 2023-01-01 RX ADMIN — IPRATROPIUM BROMIDE AND ALBUTEROL SULFATE 3 ML: 2.5; .5 SOLUTION RESPIRATORY (INHALATION) at 11:50

## 2023-01-01 RX ADMIN — HEPARIN SODIUM 5000 UNITS: 5000 INJECTION INTRAVENOUS; SUBCUTANEOUS at 05:46

## 2023-01-01 RX ADMIN — AMIODARONE HYDROCHLORIDE 200 MG: 200 TABLET ORAL at 10:13

## 2023-01-01 RX ADMIN — IPRATROPIUM BROMIDE AND ALBUTEROL SULFATE 3 ML: 2.5; .5 SOLUTION RESPIRATORY (INHALATION) at 08:12

## 2023-01-01 RX ADMIN — Medication 10 ML: at 20:25

## 2023-01-01 RX ADMIN — IPRATROPIUM BROMIDE AND ALBUTEROL SULFATE 3 ML: 2.5; .5 SOLUTION RESPIRATORY (INHALATION) at 17:23

## 2023-01-01 RX ADMIN — TAZOBACTAM SODIUM AND PIPERACILLIN SODIUM 3.38 G: 375; 3 INJECTION, SOLUTION INTRAVENOUS at 21:23

## 2023-01-01 RX ADMIN — MORPHINE SULFATE 4 MG: 4 INJECTION, SOLUTION INTRAMUSCULAR; INTRAVENOUS at 08:48

## 2023-01-01 RX ADMIN — POTASSIUM CHLORIDE 10 MEQ: 7.46 INJECTION, SOLUTION INTRAVENOUS at 02:59

## 2023-01-01 RX ADMIN — SODIUM CHLORIDE SOLN NEBU 7% 4 ML: 7 NEBU SOLN at 09:28

## 2023-01-01 RX ADMIN — INSULIN HUMAN 3 UNITS: 100 INJECTION, SOLUTION PARENTERAL at 06:11

## 2023-01-01 RX ADMIN — AMIODARONE HYDROCHLORIDE 200 MG: 200 TABLET ORAL at 20:25

## 2023-01-01 RX ADMIN — METHYLPREDNISOLONE SODIUM SUCCINATE 40 MG: 40 INJECTION, POWDER, FOR SOLUTION INTRAMUSCULAR; INTRAVENOUS at 14:06

## 2023-01-01 RX ADMIN — Medication 10 ML: at 08:27

## 2023-01-01 RX ADMIN — IPRATROPIUM BROMIDE AND ALBUTEROL SULFATE 3 ML: 2.5; .5 SOLUTION RESPIRATORY (INHALATION) at 12:12

## 2023-01-01 RX ADMIN — ACETAMINOPHEN 650 MG: 650 SOLUTION ORAL at 05:46

## 2023-01-01 RX ADMIN — METHYLPREDNISOLONE SODIUM SUCCINATE 40 MG: 40 INJECTION, POWDER, FOR SOLUTION INTRAMUSCULAR; INTRAVENOUS at 21:26

## 2023-01-01 RX ADMIN — HYDROGEN PEROXIDE: 3 LIQUID TOPICAL at 08:47

## 2023-01-01 RX ADMIN — METOPROLOL TARTRATE 5 MG: 5 INJECTION INTRAVENOUS at 10:37

## 2023-01-01 RX ADMIN — SERTRALINE 100 MG: 100 TABLET, FILM COATED ORAL at 08:57

## 2023-01-01 RX ADMIN — HEPARIN SODIUM 5000 UNITS: 5000 INJECTION INTRAVENOUS; SUBCUTANEOUS at 13:01

## 2023-01-01 RX ADMIN — SODIUM CHLORIDE 75 ML/HR: 9 INJECTION, SOLUTION INTRAVENOUS at 12:29

## 2023-01-01 RX ADMIN — LORAZEPAM 0.5 MG: 2 INJECTION INTRAMUSCULAR; INTRAVENOUS at 08:48

## 2023-01-01 RX ADMIN — AMIODARONE HYDROCHLORIDE 150 MG: 1.5 INJECTION, SOLUTION INTRAVENOUS at 20:38

## 2023-01-01 RX ADMIN — IPRATROPIUM BROMIDE AND ALBUTEROL SULFATE 3 ML: 2.5; .5 SOLUTION RESPIRATORY (INHALATION) at 16:20

## 2023-01-01 RX ADMIN — IPRATROPIUM BROMIDE AND ALBUTEROL SULFATE 3 ML: 2.5; .5 SOLUTION RESPIRATORY (INHALATION) at 23:37

## 2023-01-01 RX ADMIN — IPRATROPIUM BROMIDE AND ALBUTEROL SULFATE 3 ML: 2.5; .5 SOLUTION RESPIRATORY (INHALATION) at 08:01

## 2023-01-01 RX ADMIN — LORAZEPAM 0.5 MG: 2 INJECTION INTRAMUSCULAR; INTRAVENOUS at 14:26

## 2023-01-01 RX ADMIN — IPRATROPIUM BROMIDE AND ALBUTEROL SULFATE 3 ML: 2.5; .5 SOLUTION RESPIRATORY (INHALATION) at 20:33

## 2023-01-01 RX ADMIN — IPRATROPIUM BROMIDE AND ALBUTEROL SULFATE 3 ML: 2.5; .5 SOLUTION RESPIRATORY (INHALATION) at 23:31

## 2023-01-01 RX ADMIN — IPRATROPIUM BROMIDE AND ALBUTEROL SULFATE 3 ML: 2.5; .5 SOLUTION RESPIRATORY (INHALATION) at 06:55

## 2023-01-01 RX ADMIN — TAZOBACTAM SODIUM AND PIPERACILLIN SODIUM 3.38 G: 375; 3 INJECTION, SOLUTION INTRAVENOUS at 21:50

## 2023-01-01 RX ADMIN — LORAZEPAM 1 MG: 2 INJECTION INTRAMUSCULAR; INTRAVENOUS at 16:27

## 2023-01-01 RX ADMIN — POTASSIUM CHLORIDE 10 MEQ: 7.46 INJECTION, SOLUTION INTRAVENOUS at 00:44

## 2023-01-01 RX ADMIN — IPRATROPIUM BROMIDE AND ALBUTEROL SULFATE 3 ML: 2.5; .5 SOLUTION RESPIRATORY (INHALATION) at 04:27

## 2023-01-01 RX ADMIN — BUDESONIDE 0.5 MG: 0.5 SUSPENSION RESPIRATORY (INHALATION) at 19:05

## 2023-01-01 RX ADMIN — METOPROLOL TARTRATE 5 MG: 5 INJECTION INTRAVENOUS at 14:43

## 2023-01-01 RX ADMIN — DILTIAZEM HYDROCHLORIDE 30 MG: 30 TABLET, FILM COATED ORAL at 17:31

## 2023-01-01 RX ADMIN — METHYLPREDNISOLONE SODIUM SUCCINATE 40 MG: 40 INJECTION, POWDER, FOR SOLUTION INTRAMUSCULAR; INTRAVENOUS at 13:52

## 2023-01-01 RX ADMIN — METOPROLOL TARTRATE 5 MG: 5 INJECTION INTRAVENOUS at 18:30

## 2023-01-01 RX ADMIN — METHYLPREDNISOLONE SODIUM SUCCINATE 40 MG: 40 INJECTION, POWDER, FOR SOLUTION INTRAMUSCULAR; INTRAVENOUS at 21:16

## 2023-01-01 RX ADMIN — SODIUM CHLORIDE 75 ML/HR: 9 INJECTION, SOLUTION INTRAVENOUS at 14:52

## 2023-01-01 RX ADMIN — INSULIN HUMAN 3 UNITS: 100 INJECTION, SOLUTION PARENTERAL at 13:23

## 2023-01-01 RX ADMIN — SODIUM CHLORIDE 1000 ML: 9 INJECTION, SOLUTION INTRAVENOUS at 23:02

## 2023-01-01 RX ADMIN — AMIODARONE HYDROCHLORIDE 200 MG: 200 TABLET ORAL at 13:21

## 2023-01-01 RX ADMIN — AMIODARONE HYDROCHLORIDE 150 MG: 1.5 INJECTION, SOLUTION INTRAVENOUS at 12:53

## 2023-01-01 RX ADMIN — HEPARIN SODIUM 5000 UNITS: 5000 INJECTION INTRAVENOUS; SUBCUTANEOUS at 22:36

## 2023-01-01 RX ADMIN — AMIODARONE HYDROCHLORIDE 1 MG/MIN: 1.8 INJECTION, SOLUTION INTRAVENOUS at 11:07

## 2023-01-01 RX ADMIN — SODIUM CHLORIDE 75 ML/HR: 9 INJECTION, SOLUTION INTRAVENOUS at 01:46

## 2023-01-01 RX ADMIN — TAZOBACTAM SODIUM AND PIPERACILLIN SODIUM 3.38 G: 375; 3 INJECTION, SOLUTION INTRAVENOUS at 05:05

## 2023-01-01 RX ADMIN — AMIODARONE HYDROCHLORIDE 1 MG/MIN: 1.8 INJECTION, SOLUTION INTRAVENOUS at 01:40

## 2023-01-01 RX ADMIN — POTASSIUM CHLORIDE 40 MEQ: 1.5 POWDER, FOR SOLUTION ORAL at 12:25

## 2023-01-01 RX ADMIN — HEPARIN SODIUM 5000 UNITS: 5000 INJECTION INTRAVENOUS; SUBCUTANEOUS at 14:08

## 2023-01-01 RX ADMIN — PANTOPRAZOLE SODIUM 40 MG: 40 INJECTION, POWDER, LYOPHILIZED, FOR SOLUTION INTRAVENOUS at 06:13

## 2023-01-01 RX ADMIN — IOPAMIDOL 100 ML: 755 INJECTION, SOLUTION INTRAVENOUS at 22:51

## 2023-01-01 RX ADMIN — POTASSIUM CHLORIDE 10 MEQ: 7.46 INJECTION, SOLUTION INTRAVENOUS at 23:20

## 2023-01-01 RX ADMIN — AMIODARONE HYDROCHLORIDE 200 MG: 200 TABLET ORAL at 08:15

## 2023-01-01 RX ADMIN — HEPARIN SODIUM 5000 UNITS: 5000 INJECTION INTRAVENOUS; SUBCUTANEOUS at 06:12

## 2023-01-01 RX ADMIN — SODIUM CHLORIDE 75 ML/HR: 9 INJECTION, SOLUTION INTRAVENOUS at 21:56

## 2023-01-01 RX ADMIN — MAGNESIUM SULFATE HEPTAHYDRATE 2 G: 2 INJECTION, SOLUTION INTRAVENOUS at 19:16

## 2023-01-01 RX ADMIN — BUDESONIDE 0.5 MG: 0.5 SUSPENSION RESPIRATORY (INHALATION) at 06:31

## 2023-01-01 RX ADMIN — SODIUM CHLORIDE 75 ML/HR: 9 INJECTION, SOLUTION INTRAVENOUS at 17:00

## 2023-01-01 RX ADMIN — HEPARIN SODIUM 5000 UNITS: 5000 INJECTION INTRAVENOUS; SUBCUTANEOUS at 21:15

## 2023-01-01 RX ADMIN — SODIUM CHLORIDE 75 ML/HR: 9 INJECTION, SOLUTION INTRAVENOUS at 00:34

## 2023-01-01 RX ADMIN — IPRATROPIUM BROMIDE AND ALBUTEROL SULFATE 3 ML: 2.5; .5 SOLUTION RESPIRATORY (INHALATION) at 19:05

## 2023-01-01 RX ADMIN — METOPROLOL TARTRATE 25 MG: 25 TABLET, FILM COATED ORAL at 08:37

## 2023-01-01 RX ADMIN — METHYLPREDNISOLONE SODIUM SUCCINATE 40 MG: 40 INJECTION, POWDER, FOR SOLUTION INTRAMUSCULAR; INTRAVENOUS at 05:36

## 2023-01-01 RX ADMIN — Medication 10 ML: at 12:01

## 2023-01-01 RX ADMIN — HEPARIN SODIUM 5000 UNITS: 5000 INJECTION INTRAVENOUS; SUBCUTANEOUS at 22:35

## 2023-01-01 RX ADMIN — ACETAMINOPHEN 650 MG: 650 SOLUTION ORAL at 01:40

## 2023-01-01 RX ADMIN — BUDESONIDE 0.5 MG: 0.5 SUSPENSION RESPIRATORY (INHALATION) at 08:01

## 2023-01-01 RX ADMIN — IPRATROPIUM BROMIDE AND ALBUTEROL SULFATE 3 ML: 2.5; .5 SOLUTION RESPIRATORY (INHALATION) at 20:31

## 2023-01-01 RX ADMIN — IPRATROPIUM BROMIDE AND ALBUTEROL SULFATE 3 ML: 2.5; .5 SOLUTION RESPIRATORY (INHALATION) at 02:49

## 2023-01-01 RX ADMIN — BUDESONIDE 0.5 MG: 0.5 SUSPENSION RESPIRATORY (INHALATION) at 20:31

## 2023-01-01 RX ADMIN — HEPARIN SODIUM 5000 UNITS: 5000 INJECTION INTRAVENOUS; SUBCUTANEOUS at 14:54

## 2023-01-01 RX ADMIN — PANTOPRAZOLE SODIUM 40 MG: 40 INJECTION, POWDER, LYOPHILIZED, FOR SOLUTION INTRAVENOUS at 05:46

## 2023-01-01 RX ADMIN — SERTRALINE 100 MG: 100 TABLET, FILM COATED ORAL at 08:15

## 2023-01-01 RX ADMIN — LORAZEPAM 0.5 MG: 2 INJECTION INTRAMUSCULAR; INTRAVENOUS at 04:31

## 2023-01-01 RX ADMIN — ARFORMOTEROL TARTRATE 15 MCG: 15 SOLUTION RESPIRATORY (INHALATION) at 19:55

## 2023-01-01 RX ADMIN — AMIODARONE HYDROCHLORIDE 1 MG/MIN: 1.8 INJECTION, SOLUTION INTRAVENOUS at 16:17

## 2023-01-01 RX ADMIN — AMIODARONE HYDROCHLORIDE 150 MG: 1.5 INJECTION, SOLUTION INTRAVENOUS at 15:21

## 2023-01-01 RX ADMIN — HEPARIN SODIUM 5000 UNITS: 5000 INJECTION INTRAVENOUS; SUBCUTANEOUS at 14:06

## 2023-01-01 RX ADMIN — TAZOBACTAM SODIUM AND PIPERACILLIN SODIUM 3.38 G: 375; 3 INJECTION, SOLUTION INTRAVENOUS at 21:16

## 2023-01-01 RX ADMIN — HEPARIN SODIUM 5000 UNITS: 5000 INJECTION INTRAVENOUS; SUBCUTANEOUS at 13:23

## 2023-01-01 RX ADMIN — IPRATROPIUM BROMIDE AND ALBUTEROL SULFATE 3 ML: 2.5; .5 SOLUTION RESPIRATORY (INHALATION) at 14:55

## 2023-01-01 RX ADMIN — HEPARIN SODIUM 5000 UNITS: 5000 INJECTION INTRAVENOUS; SUBCUTANEOUS at 13:51

## 2023-01-01 RX ADMIN — Medication 10 ML: at 08:22

## 2023-01-01 RX ADMIN — INSULIN DETEMIR 6 UNITS: 100 INJECTION, SOLUTION SUBCUTANEOUS at 08:15

## 2023-01-01 RX ADMIN — HEPARIN SODIUM 5000 UNITS: 5000 INJECTION INTRAVENOUS; SUBCUTANEOUS at 13:10

## 2023-01-01 RX ADMIN — METOCLOPRAMIDE 10 MG: 5 INJECTION, SOLUTION INTRAMUSCULAR; INTRAVENOUS at 21:56

## 2023-01-01 RX ADMIN — SERTRALINE 100 MG: 100 TABLET, FILM COATED ORAL at 09:53

## 2023-01-01 RX ADMIN — PANTOPRAZOLE SODIUM 40 MG: 40 INJECTION, POWDER, LYOPHILIZED, FOR SOLUTION INTRAVENOUS at 05:45

## 2023-01-01 RX ADMIN — METHYLPREDNISOLONE SODIUM SUCCINATE 40 MG: 40 INJECTION, POWDER, FOR SOLUTION INTRAMUSCULAR; INTRAVENOUS at 05:27

## 2023-01-01 RX ADMIN — METHYLPREDNISOLONE SODIUM SUCCINATE 40 MG: 40 INJECTION, POWDER, FOR SOLUTION INTRAMUSCULAR; INTRAVENOUS at 21:15

## 2023-01-01 RX ADMIN — SERTRALINE 100 MG: 100 TABLET, FILM COATED ORAL at 08:37

## 2023-01-01 RX ADMIN — INSULIN HUMAN 2 UNITS: 100 INJECTION, SOLUTION PARENTERAL at 18:30

## 2023-01-01 RX ADMIN — IPRATROPIUM BROMIDE AND ALBUTEROL SULFATE 3 ML: 2.5; .5 SOLUTION RESPIRATORY (INHALATION) at 20:10

## 2023-01-01 RX ADMIN — IPRATROPIUM BROMIDE AND ALBUTEROL SULFATE 3 ML: 2.5; .5 SOLUTION RESPIRATORY (INHALATION) at 12:46

## 2023-01-01 RX ADMIN — IPRATROPIUM BROMIDE AND ALBUTEROL SULFATE 3 ML: 2.5; .5 SOLUTION RESPIRATORY (INHALATION) at 07:25

## 2023-01-01 RX ADMIN — PANTOPRAZOLE SODIUM 40 MG: 40 INJECTION, POWDER, LYOPHILIZED, FOR SOLUTION INTRAVENOUS at 06:20

## 2023-01-01 RX ADMIN — Medication 10 ML: at 20:39

## 2023-01-01 RX ADMIN — TAZOBACTAM SODIUM AND PIPERACILLIN SODIUM 3.38 G: 375; 3 INJECTION, SOLUTION INTRAVENOUS at 13:52

## 2023-01-01 RX ADMIN — POTASSIUM CHLORIDE 40 MEQ: 1.5 POWDER, FOR SOLUTION ORAL at 15:49

## 2023-01-01 RX ADMIN — METHYLPREDNISOLONE SODIUM SUCCINATE 40 MG: 40 INJECTION, POWDER, FOR SOLUTION INTRAMUSCULAR; INTRAVENOUS at 05:00

## 2023-01-01 RX ADMIN — METHYLPREDNISOLONE SODIUM SUCCINATE 40 MG: 40 INJECTION, POWDER, FOR SOLUTION INTRAMUSCULAR; INTRAVENOUS at 13:26

## 2023-01-01 RX ADMIN — METHYLPREDNISOLONE SODIUM SUCCINATE 40 MG: 40 INJECTION, POWDER, FOR SOLUTION INTRAMUSCULAR; INTRAVENOUS at 05:06

## 2023-01-01 RX ADMIN — TAZOBACTAM SODIUM AND PIPERACILLIN SODIUM 3.38 G: 375; 3 INJECTION, SOLUTION INTRAVENOUS at 13:26

## 2023-01-01 RX ADMIN — METHYLPREDNISOLONE SODIUM SUCCINATE 40 MG: 40 INJECTION, POWDER, FOR SOLUTION INTRAMUSCULAR; INTRAVENOUS at 05:46

## 2023-01-01 RX ADMIN — LABETALOL HYDROCHLORIDE 10 MG: 5 INJECTION, SOLUTION INTRAVENOUS at 21:55

## 2023-01-01 RX ADMIN — AMIODARONE HYDROCHLORIDE 200 MG: 200 TABLET ORAL at 08:27

## 2023-01-01 RX ADMIN — AMIODARONE HYDROCHLORIDE 1 MG/MIN: 1.8 INJECTION, SOLUTION INTRAVENOUS at 04:49

## 2023-01-01 RX ADMIN — METOPROLOL TARTRATE 25 MG: 25 TABLET, FILM COATED ORAL at 10:13

## 2023-01-01 RX ADMIN — ACETAMINOPHEN 650 MG: 650 SOLUTION ORAL at 15:10

## 2023-01-01 RX ADMIN — SODIUM CHLORIDE 1000 ML: 9 INJECTION, SOLUTION INTRAVENOUS at 21:35

## 2023-01-01 RX ADMIN — Medication 10 ML: at 21:15

## 2023-01-01 RX ADMIN — HEPARIN SODIUM 5000 UNITS: 5000 INJECTION INTRAVENOUS; SUBCUTANEOUS at 01:17

## 2023-01-01 RX ADMIN — LORAZEPAM 0.5 MG: 2 INJECTION INTRAMUSCULAR; INTRAVENOUS at 00:32

## 2023-01-01 RX ADMIN — IPRATROPIUM BROMIDE AND ALBUTEROL SULFATE 3 ML: 2.5; .5 SOLUTION RESPIRATORY (INHALATION) at 07:49

## 2023-01-01 RX ADMIN — IPRATROPIUM BROMIDE AND ALBUTEROL SULFATE 3 ML: 2.5; .5 SOLUTION RESPIRATORY (INHALATION) at 15:27

## 2023-01-01 RX ADMIN — INSULIN DETEMIR 10 UNITS: 100 INJECTION, SOLUTION SUBCUTANEOUS at 08:27

## 2023-01-01 RX ADMIN — LORAZEPAM 0.25 MG: 2 INJECTION INTRAMUSCULAR; INTRAVENOUS at 03:20

## 2023-01-01 RX ADMIN — METOPROLOL TARTRATE 5 MG: 5 INJECTION INTRAVENOUS at 22:42

## 2023-01-01 RX ADMIN — LABETALOL HYDROCHLORIDE 20 MG: 5 INJECTION, SOLUTION INTRAVENOUS at 12:08

## 2023-01-01 RX ADMIN — LORAZEPAM 0.5 MG: 2 INJECTION INTRAMUSCULAR; INTRAVENOUS at 21:14

## 2023-01-01 RX ADMIN — POTASSIUM CHLORIDE 10 MEQ: 7.46 INJECTION, SOLUTION INTRAVENOUS at 01:52

## 2023-01-01 RX ADMIN — TAZOBACTAM SODIUM AND PIPERACILLIN SODIUM 3.38 G: 375; 3 INJECTION, SOLUTION INTRAVENOUS at 14:06

## 2023-01-01 RX ADMIN — POTASSIUM CHLORIDE 10 MEQ: 7.46 INJECTION, SOLUTION INTRAVENOUS at 05:08

## 2023-01-01 RX ADMIN — TAZOBACTAM SODIUM AND PIPERACILLIN SODIUM 3.38 G: 375; 3 INJECTION, SOLUTION INTRAVENOUS at 23:04

## 2023-01-01 RX ADMIN — Medication 10 ML: at 09:53

## 2023-01-01 RX ADMIN — DILTIAZEM HYDROCHLORIDE 30 MG: 30 TABLET, FILM COATED ORAL at 09:53

## 2023-01-01 RX ADMIN — SODIUM CHLORIDE 75 ML/HR: 9 INJECTION, SOLUTION INTRAVENOUS at 22:39

## 2023-01-01 RX ADMIN — TAZOBACTAM SODIUM AND PIPERACILLIN SODIUM 3.38 G: 375; 3 INJECTION, SOLUTION INTRAVENOUS at 05:24

## 2023-01-01 RX ADMIN — BUDESONIDE 0.5 MG: 0.5 SUSPENSION RESPIRATORY (INHALATION) at 18:44

## 2023-01-01 RX ADMIN — HEPARIN SODIUM 5000 UNITS: 5000 INJECTION INTRAVENOUS; SUBCUTANEOUS at 21:50

## 2023-01-01 RX ADMIN — BUDESONIDE 0.5 MG: 0.5 SUSPENSION RESPIRATORY (INHALATION) at 19:58

## 2023-01-01 RX ADMIN — ACETAMINOPHEN 650 MG: 650 SOLUTION ORAL at 08:27

## 2023-01-01 RX ADMIN — METHYLPREDNISOLONE SODIUM SUCCINATE 40 MG: 40 INJECTION, POWDER, FOR SOLUTION INTRAMUSCULAR; INTRAVENOUS at 21:14

## 2023-01-01 RX ADMIN — IPRATROPIUM BROMIDE AND ALBUTEROL SULFATE 3 ML: 2.5; .5 SOLUTION RESPIRATORY (INHALATION) at 04:20

## 2023-01-01 RX ADMIN — IPRATROPIUM BROMIDE AND ALBUTEROL SULFATE 3 ML: 2.5; .5 SOLUTION RESPIRATORY (INHALATION) at 08:14

## 2023-01-01 RX ADMIN — INSULIN HUMAN 3 UNITS: 100 INJECTION, SOLUTION PARENTERAL at 23:25

## 2023-01-01 RX ADMIN — MAGNESIUM SULFATE HEPTAHYDRATE 4 G: 40 INJECTION, SOLUTION INTRAVENOUS at 01:16

## 2023-01-01 RX ADMIN — IPRATROPIUM BROMIDE AND ALBUTEROL SULFATE 3 ML: 2.5; .5 SOLUTION RESPIRATORY (INHALATION) at 01:26

## 2023-01-01 RX ADMIN — VANCOMYCIN HYDROCHLORIDE 1250 MG: 10 INJECTION, POWDER, LYOPHILIZED, FOR SOLUTION INTRAVENOUS at 00:37

## 2023-01-01 RX ADMIN — PREDNISONE 40 MG: 20 TABLET ORAL at 19:53

## 2023-01-01 RX ADMIN — PREDNISONE 40 MG: 20 TABLET ORAL at 08:27

## 2023-01-01 RX ADMIN — INSULIN HUMAN 2 UNITS: 100 INJECTION, SOLUTION PARENTERAL at 06:51

## 2023-01-01 RX ADMIN — MORPHINE SULFATE 4 MG: 4 INJECTION, SOLUTION INTRAMUSCULAR; INTRAVENOUS at 04:31

## 2023-01-01 RX ADMIN — BUDESONIDE 0.5 MG: 0.5 SUSPENSION RESPIRATORY (INHALATION) at 06:55

## 2023-01-01 RX ADMIN — METOPROLOL TARTRATE 5 MG: 5 INJECTION INTRAVENOUS at 22:41

## 2023-01-01 RX ADMIN — VANCOMYCIN HYDROCHLORIDE 1000 MG: 1 INJECTION, SOLUTION INTRAVENOUS at 09:27

## 2023-01-01 RX ADMIN — BUDESONIDE 0.5 MG: 0.5 SUSPENSION RESPIRATORY (INHALATION) at 08:28

## 2023-01-01 RX ADMIN — ACETAMINOPHEN 650 MG: 650 SOLUTION ORAL at 08:15

## 2023-01-01 RX ADMIN — AMIODARONE HYDROCHLORIDE 200 MG: 200 TABLET ORAL at 08:37

## 2023-01-01 RX ADMIN — IPRATROPIUM BROMIDE AND ALBUTEROL SULFATE 3 ML: 2.5; .5 SOLUTION RESPIRATORY (INHALATION) at 03:01

## 2023-01-01 RX ADMIN — TAZOBACTAM SODIUM AND PIPERACILLIN SODIUM 3.38 G: 375; 3 INJECTION, SOLUTION INTRAVENOUS at 06:23

## 2023-01-01 RX ADMIN — INSULIN HUMAN 3 UNITS: 100 INJECTION, SOLUTION PARENTERAL at 06:13

## 2023-01-01 RX ADMIN — AMIODARONE HYDROCHLORIDE 1 MG/MIN: 1.8 INJECTION, SOLUTION INTRAVENOUS at 10:13

## 2023-01-01 RX ADMIN — IPRATROPIUM BROMIDE AND ALBUTEROL SULFATE 3 ML: 2.5; .5 SOLUTION RESPIRATORY (INHALATION) at 15:50

## 2023-01-01 RX ADMIN — IPRATROPIUM BROMIDE AND ALBUTEROL SULFATE 3 ML: 2.5; .5 SOLUTION RESPIRATORY (INHALATION) at 10:21

## 2023-01-01 RX ADMIN — TAZOBACTAM SODIUM AND PIPERACILLIN SODIUM 3.38 G: 375; 3 INJECTION, SOLUTION INTRAVENOUS at 05:31

## 2023-01-01 RX ADMIN — PHENYLEPHRINE HYDROCHLORIDE 0.5 MCG/KG/MIN: 10 INJECTION INTRAVENOUS at 18:29

## 2023-01-01 RX ADMIN — IPRATROPIUM BROMIDE AND ALBUTEROL SULFATE 3 ML: 2.5; .5 SOLUTION RESPIRATORY (INHALATION) at 10:44

## 2023-01-01 RX ADMIN — HEPARIN SODIUM 5000 UNITS: 5000 INJECTION INTRAVENOUS; SUBCUTANEOUS at 21:26

## 2023-01-01 RX ADMIN — LABETALOL HYDROCHLORIDE 10 MG: 5 INJECTION, SOLUTION INTRAVENOUS at 00:36

## 2023-01-01 RX ADMIN — TAZOBACTAM SODIUM AND PIPERACILLIN SODIUM 3.38 G: 375; 3 INJECTION, SOLUTION INTRAVENOUS at 14:55

## 2023-01-01 RX ADMIN — METHYLPREDNISOLONE SODIUM SUCCINATE 40 MG: 40 INJECTION, POWDER, FOR SOLUTION INTRAMUSCULAR; INTRAVENOUS at 18:00

## 2023-01-01 RX ADMIN — INSULIN DETEMIR 10 UNITS: 100 INJECTION, SOLUTION SUBCUTANEOUS at 09:54

## 2023-01-01 RX ADMIN — MORPHINE SULFATE 2 MG: 2 INJECTION, SOLUTION INTRAMUSCULAR; INTRAVENOUS at 15:17

## 2023-01-01 RX ADMIN — TAZOBACTAM SODIUM AND PIPERACILLIN SODIUM 3.38 G: 375; 3 INJECTION, SOLUTION INTRAVENOUS at 14:08

## 2023-03-21 PROBLEM — N39.0 UTI (URINARY TRACT INFECTION): Status: ACTIVE | Noted: 2023-01-01

## 2023-03-21 PROBLEM — M06.9 RA (RHEUMATOID ARTHRITIS): Status: ACTIVE | Noted: 2023-01-01

## 2023-03-21 PROBLEM — D84.9 IMMUNOSUPPRESSION (HCC): Status: ACTIVE | Noted: 2023-01-01

## 2023-03-21 PROBLEM — K21.9 GERD (GASTROESOPHAGEAL REFLUX DISEASE): Status: ACTIVE | Noted: 2023-01-01

## 2023-03-21 PROBLEM — R91.1 PULMONARY NODULE: Chronic | Status: ACTIVE | Noted: 2022-01-01

## 2023-03-21 PROBLEM — N17.9 AKI (ACUTE KIDNEY INJURY): Status: ACTIVE | Noted: 2023-01-01

## 2023-03-21 PROBLEM — J44.9 STAGE 4 VERY SEVERE COPD BY GOLD CLASSIFICATION (HCC): Chronic | Status: ACTIVE | Noted: 2022-01-01

## 2023-03-21 PROBLEM — D84.9 IMMUNOSUPPRESSION: Chronic | Status: ACTIVE | Noted: 2023-01-01

## 2023-03-21 PROBLEM — J96.22 ACUTE ON CHRONIC RESPIRATORY FAILURE WITH HYPOXIA AND HYPERCAPNIA: Status: ACTIVE | Noted: 2023-01-01

## 2023-03-21 PROBLEM — M06.9 RA (RHEUMATOID ARTHRITIS) (HCC): Chronic | Status: ACTIVE | Noted: 2023-01-01

## 2023-03-21 PROBLEM — K21.9 GERD (GASTROESOPHAGEAL REFLUX DISEASE): Chronic | Status: ACTIVE | Noted: 2023-01-01

## 2023-03-21 PROBLEM — J47.9 BRONCHIECTASIS WITHOUT COMPLICATION (HCC): Chronic | Status: ACTIVE | Noted: 2022-01-01

## 2023-03-21 PROBLEM — J96.21 ACUTE ON CHRONIC RESPIRATORY FAILURE WITH HYPOXIA AND HYPERCAPNIA (HCC): Status: ACTIVE | Noted: 2023-01-01

## 2023-03-21 NOTE — LETTER
[facility]  [address] [id number]  [phone]        March 31, 2023      Patient: Sharmila Delarosa  YOB: 1950  Date of Visit: 3/21/2023      For inpatient hospice admission at [facility]  Referring Makayla Calvert  Certifying Dr Keeley Avendaño MSW, CSW  [phone]

## 2023-03-22 PROBLEM — J96.02 ACUTE RESPIRATORY FAILURE WITH HYPOXIA AND HYPERCARBIA (HCC): Status: ACTIVE | Noted: 2023-01-01

## 2023-03-22 PROBLEM — J96.01 ACUTE RESPIRATORY FAILURE WITH HYPOXIA AND HYPERCARBIA: Status: ACTIVE | Noted: 2023-01-01

## 2023-03-22 NOTE — PROGRESS NOTES
"Pharmacy Consult-Vancomycin Dosing  Sharmila Delarosa is a  72 y.o. female receiving vancomycin therapy.     Indication:PNA  Consulting Provider: intensivist  ID Consult:     Goal Trough: 15-20    Current Antimicrobial Therapy  Anti-Infectives (From admission, onward)      Ordered     Dose/Rate Route Frequency Start Stop    03/21/23 2305  piperacillin-tazobactam (ZOSYN) 3.375 g in iso-osmotic dextrose 50 ml (premix)        Ordering Provider: Irene Gomez DNP, APRESTEVAN    3.375 g  over 4 Hours Intravenous Every 8 Hours 03/22/23 0600 03/29/23 0559    03/21/23 2321  vancomycin 1250 mg/250 mL 0.9% NS IVPB ([facility])        Ordering Provider: Bala Suero, PharmD    20 mg/kg × 59 kg  over 90 Minutes Intravenous Once 03/21/23 2323 03/22/23 0207    03/21/23 2305  Pharmacy to dose vancomycin        Ordering Provider: Irene Gomez DNP, APRN     Does not apply Continuous PRN 03/21/23 2305 03/28/23 2304    03/21/23 2158  AZITHROMYCIN 500 MG/250 ML 0.9% NS IVPB (vial-mate)        Ordering Provider: Júnior Flores MD    500 mg  over 60 Minutes Intravenous Once 03/21/23 2300 03/22/23 0033    03/21/23 2158  piperacillin-tazobactam (ZOSYN) 3.375 g in iso-osmotic dextrose 50 ml (premix)        Ordering Provider: Júnior Flores MD    3.375 g  over 30 Minutes Intravenous Once 03/21/23 2200 03/21/23 2334            Allergies  Allergies as of 03/21/2023 - Reviewed 03/21/2023   Allergen Reaction Noted    Cephalosporins Unknown - Low Severity 09/08/2022       Labs    Results from last 7 days   Lab Units 03/21/23 2129   BUN mg/dL 24*   CREATININE mg/dL 1.23*       Results from last 7 days   Lab Units 03/21/23 2129   WBC 10*3/mm3 27.74*       Evaluation of Dosing     Last Dose Received in the ED/Outside Facility: 0  Is Patient on Dialysis or Renal Replacement: no    Ht - 167.6 cm (66\")  Wt - 59 kg (130 lb)    Estimated Creatinine Clearance: 39.6 mL/min (A) (by C-G formula based on SCr of 1.23 mg/dL (H)).    Intake & Output " (last 3 days)         03/19 0701  03/20 0700 03/20 0701  03/21 0700 03/21 0701  03/22 0700    I.V. (mL/kg)   100 (1.7)    IV Piggyback   3050    Total Intake(mL/kg)   3150 (53.4)    Net   +3150                   Microbiology and Radiology  Microbiology Results (last 10 days)       Procedure Component Value - Date/Time    COVID PRE-OP / PRE-PROCEDURE SCREENING ORDER (NO ISOLATION) - Swab, Nasopharynx [[id number]]  (Normal) Collected: 03/21/23 2152    Lab Status: Final result Specimen: Swab from Nasopharynx Updated: 03/21/23 2256    Narrative:      The following orders were created for panel order COVID PRE-OP / PRE-PROCEDURE SCREENING ORDER (NO ISOLATION) - Swab, Nasopharynx.  Procedure                               Abnormality         Status                     ---------                               -----------         ------                     Respiratory Panel PCR w/...[[id number]]  Normal              Final result                 Please view results for these tests on the individual orders.    Respiratory Panel PCR w/COVID-19(SARS-CoV-2) DANETTE/RENETTA/NIKKI/PAD/COR/MAD/LAVONNE In-House, NP Swab in UTM/VTM, 3-4 HR TAT - Swab, Nasopharynx [[id number]]  (Normal) Collected: 03/21/23 2152    Lab Status: Final result Specimen: Swab from Nasopharynx Updated: 03/21/23 2256     ADENOVIRUS, PCR Not Detected     Coronavirus 229E Not Detected     Coronavirus HKU1 Not Detected     Coronavirus NL63 Not Detected     Coronavirus OC43 Not Detected     COVID19 Not Detected     Human Metapneumovirus Not Detected     Human Rhinovirus/Enterovirus Not Detected     Influenza A PCR Not Detected     Influenza B PCR Not Detected     Parainfluenza Virus 1 Not Detected     Parainfluenza Virus 2 Not Detected     Parainfluenza Virus 3 Not Detected     Parainfluenza Virus 4 Not Detected     RSV, PCR Not Detected     Bordetella pertussis pcr Not Detected     Bordetella parapertussis PCR Not Detected     Chlamydophila pneumoniae PCR Not Detected      Mycoplasma pneumo by PCR Not Detected    Narrative:      In the setting of a positive respiratory panel with a viral infection PLUS a negative procalcitonin without other underlying concern for bacterial infection, consider observing off antibiotics or discontinuation of antibiotics and continue supportive care. If the respiratory panel is positive for atypical bacterial infection (Bordetella pertussis, Chlamydophila pneumoniae, or Mycoplasma pneumoniae), consider antibiotic de-escalation to target atypical bacterial infection.            Vancomycin Levels:                        Assessment/Plan:  The patient will be started on a bolus of 20mg/kg for a dose of 1250mg . Given the patient's current renal status, will dose per random levels and obtain a random level with morning labs before ordering another dose.  Due to infection severity, will target a trough of 15-20.    Pharmacy will continue to follow the patient’s culture results and clinical progress daily.    Bala Suero, ElizabethD

## 2023-03-22 NOTE — ED PROVIDER NOTES
EMERGENCY DEPARTMENT ENCOUNTER    Pt Name: Sharmila Delarosa  MRN: [id number]  Pt :   1950  Room Number:    Date of encounter:  3/21/2023  PCP: Bala Gomez MD  ED Provider: Júnior Flores MD    Historian: EMS      HPI:  Chief Complaint: Decreased mental status        Context: Sharmila Delarosa is a 72 y.o. female who presents to the ED c/o unresponsive, found by family at home, EMS reported the patient was hypoxic at 70% on room air.  They applied a nonrebreather and transported her here.  No history of recent illness or precipitating events.      PAST MEDICAL HISTORY  Past Medical History:   Diagnosis Date   • Anxiety    • Breast cancer (HCC)     Right breast    • Depression    • Drug therapy    • GERD 3/21/2023   • Hx of radiation therapy    • On chronic immunosuppression therapy 3/21/2023   • RA (rheumatoid arthritis) 3/21/2023         PAST SURGICAL HISTORY  Past Surgical History:   Procedure Laterality Date   • BREAST BIOPSY Right 2015    X 3 BX's   • BREAST LUMPECTOMY           FAMILY HISTORY  Family History   Problem Relation Age of Onset   • Aortic stenosis Mother    • Melanoma Father    • Ovarian cancer Sister    • Breast cancer Sister    • Rectal cancer Sister    • Breast cancer Maternal Grandmother    • Breast cancer Daughter          SOCIAL HISTORY  Social History     Socioeconomic History   • Marital status:    Tobacco Use   • Smoking status: Former     Packs/day: 1.00     Years: 40.00     Pack years: 40.00     Types: Cigarettes     Quit date: 2018     Years since quittin.2   • Smokeless tobacco: Never   Vaping Use   • Vaping Use: Never used   Substance and Sexual Activity   • Alcohol use: Never   • Drug use: Never   • Sexual activity: Defer         ALLERGIES  Cephalosporins        REVIEW OF SYSTEMS  Review of Systems     All systems reviewed and negative except for those discussed in HPI.       PHYSICAL EXAM    I have reviewed the triage vital signs and nursing  notes.    ED Triage Vitals   Temp Pulse Resp BP SpO2   -- -- -- -- --      Temp src Heart Rate Source Patient Position BP Location FiO2 (%)   -- -- -- -- --       Physical Exam  GENERAL:   Appears in distress, lethargic.  HENT: Nares patent.  EYES: No scleral icterus.  CV: Regular rhythm, regular rate.  Murmur.  Has peripheral edema bilaterally.  RESPIRATORY: Positive for respiratory distress.  Wheezing, decreased air movements bilaterally.  ABDOMEN: Soft, nontender  MUSCULOSKELETAL: No deformities.   NEURO: Eyes are open, moves all extremities, not answering questions, seems in a stupor or lethargic.  SKIN: Warm, dry, no rash visualized.      LAB RESULTS  Recent Results (from the past 24 hour(s))   Comprehensive Metabolic Panel    Collection Time: 03/21/23  9:29 PM    Specimen: Blood   Result Value Ref Range    Glucose 164 (H) 65 - 99 mg/dL    BUN 24 (H) 8 - 23 mg/dL    Creatinine 1.23 (H) 0.57 - 1.00 mg/dL    Sodium 145 136 - 145 mmol/L    Potassium 3.1 (L) 3.5 - 5.2 mmol/L    Chloride 88 (L) 98 - 107 mmol/L    CO2 >50.0 (H) 22.0 - 29.0 mmol/L    Calcium 8.7 8.6 - 10.5 mg/dL    Total Protein 6.7 6.0 - 8.5 g/dL    Albumin 3.2 (L) 3.5 - 5.2 g/dL    ALT (SGPT) 9 1 - 33 U/L    AST (SGOT) 17 1 - 32 U/L    Alkaline Phosphatase 80 39 - 117 U/L    Total Bilirubin 0.7 0.0 - 1.2 mg/dL    Globulin 3.5 gm/dL    A/G Ratio 0.9 g/dL    BUN/Creatinine Ratio 19.5 7.0 - 25.0    Anion Gap      eGFR 47.4 (L) >60.0 mL/min/1.73   BNP    Collection Time: 03/21/23  9:29 PM    Specimen: Blood   Result Value Ref Range    proBNP 3,834.0 (H) 0.0 - 900.0 pg/mL   Single High Sensitivity Troponin T    Collection Time: 03/21/23  9:29 PM    Specimen: Blood   Result Value Ref Range    HS Troponin T 42 (H) <10 ng/L   Green Top (Gel)    Collection Time: 03/21/23  9:29 PM   Result Value Ref Range    Extra Tube Hold for add-ons.    Lavender Top    Collection Time: 03/21/23  9:29 PM   Result Value Ref Range    Extra Tube hold for add-on    Gold Top -  SST    Collection Time: 03/21/23  9:29 PM   Result Value Ref Range    Extra Tube Hold for add-ons.    Gray Top    Collection Time: 03/21/23  9:29 PM   Result Value Ref Range    Extra Tube Hold for add-ons.    Light Blue Top    Collection Time: 03/21/23  9:29 PM   Result Value Ref Range    Extra Tube Hold for add-ons.    CBC Auto Differential    Collection Time: 03/21/23  9:29 PM    Specimen: Blood   Result Value Ref Range    WBC 27.74 (H) 3.40 - 10.80 10*3/mm3    RBC 3.06 (L) 3.77 - 5.28 10*6/mm3    Hemoglobin 8.6 (L) 12.0 - 15.9 g/dL    Hematocrit 32.1 (L) 34.0 - 46.6 %    .9 (H) 79.0 - 97.0 fL    MCH 28.1 26.6 - 33.0 pg    MCHC 26.8 (L) 31.5 - 35.7 g/dL    RDW 16.5 (H) 12.3 - 15.4 %    RDW-SD 63.6 (H) 37.0 - 54.0 fl    MPV 9.5 6.0 - 12.0 fL    Platelets 251 140 - 450 10*3/mm3    Neutrophil % 88.2 (H) 42.7 - 76.0 %    Lymphocyte % 2.8 (L) 19.6 - 45.3 %    Monocyte % 7.6 5.0 - 12.0 %    Eosinophil % 0.3 0.3 - 6.2 %    Basophil % 0.2 0.0 - 1.5 %    Immature Grans % 0.9 (H) 0.0 - 0.5 %    Neutrophils, Absolute 24.47 (H) 1.70 - 7.00 10*3/mm3    Lymphocytes, Absolute 0.79 0.70 - 3.10 10*3/mm3    Monocytes, Absolute 2.11 (H) 0.10 - 0.90 10*3/mm3    Eosinophils, Absolute 0.07 0.00 - 0.40 10*3/mm3    Basophils, Absolute 0.06 0.00 - 0.20 10*3/mm3    Immature Grans, Absolute 0.24 (H) 0.00 - 0.05 10*3/mm3    nRBC 0.0 0.0 - 0.2 /100 WBC   Lactic Acid, Plasma    Collection Time: 03/21/23  9:29 PM    Specimen: Blood   Result Value Ref Range    Lactate 2.5 (C) 0.5 - 2.0 mmol/L   D-dimer, Quantitative    Collection Time: 03/21/23  9:29 PM    Specimen: Blood   Result Value Ref Range    D-Dimer, Quantitative 0.85 (H) 0.00 - 0.70 MCGFEU/mL   Procalcitonin    Collection Time: 03/21/23  9:29 PM    Specimen: Blood   Result Value Ref Range    Procalcitonin 1.22 (H) 0.00 - 0.25 ng/mL   Magnesium    Collection Time: 03/21/23  9:29 PM    Specimen: Blood   Result Value Ref Range    Magnesium 0.8 (C) 1.6 - 2.4 mg/dL   Cortisol     Collection Time: 03/21/23  9:29 PM    Specimen: Blood   Result Value Ref Range    Cortisol 73.81   mcg/dL   TSH Rfx On Abnormal To Free T4    Collection Time: 03/21/23  9:29 PM    Specimen: Blood   Result Value Ref Range    TSH 0.454 0.270 - 4.200 uIU/mL   Iron Profile    Collection Time: 03/21/23  9:29 PM    Specimen: Blood   Result Value Ref Range    Iron 8 (L) 37 - 145 mcg/dL    Iron Saturation 3 (L) 20 - 50 %    Transferrin 158 (L) 200 - 360 mg/dL    TIBC 235 (L) 298 - 536 mcg/dL   POC Glucose Once    Collection Time: 03/21/23  9:44 PM    Specimen: Blood   Result Value Ref Range    Glucose 145 (H) 70 - 130 mg/dL   Respiratory Panel PCR w/COVID-19(SARS-CoV-2) DANETTE/RENETTA/NIKKI/PAD/COR/MAD/LAVONNE In-House, NP Swab in UTM/VTM, 3-4 HR TAT - Swab, Nasopharynx    Collection Time: 03/21/23  9:52 PM    Specimen: Nasopharynx; Swab   Result Value Ref Range    ADENOVIRUS, PCR Not Detected Not Detected    Coronavirus 229E Not Detected Not Detected    Coronavirus HKU1 Not Detected Not Detected    Coronavirus NL63 Not Detected Not Detected    Coronavirus OC43 Not Detected Not Detected    COVID19 Not Detected Not Detected - Ref. Range    Human Metapneumovirus Not Detected Not Detected    Human Rhinovirus/Enterovirus Not Detected Not Detected    Influenza A PCR Not Detected Not Detected    Influenza B PCR Not Detected Not Detected    Parainfluenza Virus 1 Not Detected Not Detected    Parainfluenza Virus 2 Not Detected Not Detected    Parainfluenza Virus 3 Not Detected Not Detected    Parainfluenza Virus 4 Not Detected Not Detected    RSV, PCR Not Detected Not Detected    Bordetella pertussis pcr Not Detected Not Detected    Bordetella parapertussis PCR Not Detected Not Detected    Chlamydophila pneumoniae PCR Not Detected Not Detected    Mycoplasma pneumo by PCR Not Detected Not Detected   Blood Gas, Arterial With Co-Ox    Collection Time: 03/21/23  9:52 PM    Specimen: Arterial Blood   Result Value Ref Range    Site Right Radial      Monico's Test N/A     pH, Arterial 7.376 7.350 - 7.450 pH units    pCO2, Arterial 93.2 (C) 35.0 - 45.0 mm Hg    pO2, Arterial 173.0 (H) 83.0 - 108.0 mm Hg    HCO3, Arterial 54.6 (H) 20.0 - 26.0 mmol/L    Base Excess, Arterial 26.1 (H) 0.0 - 2.0 mmol/L    Hemoglobin, Blood Gas 7.9 (L) 14 - 18 g/dL    Hematocrit, Blood Gas 24.1 (L) 38.0 - 51.0 %    Oxyhemoglobin 98.1 94 - 99 %    Methemoglobin -0.10 (L) 0.00 - 1.50 %    Carboxyhemoglobin 2.3 (H) 0 - 2 %    CO2 Content 57.5 (H) 22 - 33 mmol/L    Temperature 37.0 C    Barometric Pressure for Blood Gas      Modality NRB     FIO2 80 %    Rate 0 Breaths/minute    PIP 0 cmH2O    IPAP 0     EPAP 0     Note      Notified Marilynn BUCIO MD     Notified By [id number]     Notified Time 03/21/2023 21:53     pH, Temp Corrected 7.376 pH Units    pCO2, Temperature Corrected 93.2 (H) 35 - 45 mm Hg    pO2, Temperature Corrected 173 (H) 83 - 108 mm Hg   Urinalysis With Culture If Indicated - Straight Cath    Collection Time: 03/21/23 10:28 PM    Specimen: Straight Cath; Urine   Result Value Ref Range    Color, UA Yellow Yellow, Straw    Appearance, UA Cloudy (A) Clear    pH, UA 6.0 5.0 - 8.0    Specific Gravity, UA 1.015 1.001 - 1.030    Glucose, UA Negative Negative    Ketones, UA Negative Negative    Bilirubin, UA Negative Negative    Blood, UA Trace (A) Negative    Protein, UA 30 mg/dL (1+) (A) Negative    Leuk Esterase, UA Trace (A) Negative    Nitrite, UA Negative Negative    Urobilinogen, UA 1.0 E.U./dL 0.2 - 1.0 E.U./dL   Urinalysis, Microscopic Only - Straight Cath    Collection Time: 03/21/23 10:28 PM    Specimen: Straight Cath; Urine   Result Value Ref Range    RBC, UA None Seen None Seen, 0-2 /HPF    WBC, UA 3-5 (A) None Seen, 0-2 /HPF    Bacteria, UA 4+ (A) None Seen, Trace /HPF    Squamous Epithelial Cells, UA 0-2 None Seen, 0-2 /HPF    Hyaline Casts, UA 0-6 0 - 6 /LPF    Methodology Automated Microscopy    Blood Gas, Arterial With Co-Ox    Collection Time: 03/22/23 12:59 AM     Specimen: Arterial Blood   Result Value Ref Range    Site      Monico's Test      pH, Arterial      pCO2, Arterial      pO2, Arterial      HCO3, Arterial      Base Excess, Arterial      Hemoglobin, Blood Gas      Hematocrit, Blood Gas      Oxyhemoglobin      Methemoglobin      Carboxyhemoglobin      CO2 Content      Temperature      Barometric Pressure for Blood Gas      Modality Nasal Cannula     FIO2      Rate 0 Breaths/minute    PIP 0 cmH2O    IPAP 0     EPAP 0     Note      pH, Temp Corrected      pCO2, Temperature Corrected      pO2, Temperature Corrected     STAT Lactic Acid, Reflex    Collection Time: 03/22/23  1:13 AM    Specimen: Blood   Result Value Ref Range    Lactate 1.6 0.5 - 2.0 mmol/L   ECG 12 Lead Altered Mental Status    Collection Time: 03/22/23  2:09 AM   Result Value Ref Range    QT Interval 380 ms    QTC Interval 464 ms   Blood Gas, Arterial With Co-Ox    Collection Time: 03/22/23  5:41 AM    Specimen: Arterial Blood   Result Value Ref Range    Site Right Radial     Monico's Test N/A     pH, Arterial 7.316 (L) 7.350 - 7.450 pH units    pCO2, Arterial 84.0 (C) 35.0 - 45.0 mm Hg    pO2, Arterial 77.9 (L) 83.0 - 108.0 mm Hg    HCO3, Arterial 42.8 (H) 20.0 - 26.0 mmol/L    Base Excess, Arterial 14.9 (H) 0.0 - 2.0 mmol/L    Hemoglobin, Blood Gas 6.9 (L) 14 - 18 g/dL    Hematocrit, Blood Gas 21.2 (L) 38.0 - 51.0 %    Oxyhemoglobin 95.6 94 - 99 %    Methemoglobin      Carboxyhemoglobin 1.8 0 - 2 %    CO2 Content 45.4 (H) 22 - 33 mmol/L    Temperature 37.0 C    Barometric Pressure for Blood Gas      Modality BiPap     FIO2 45 %    Ventilator Mode BiPAP     Set Tidal Volume 0.35     Rate 14 Breaths/minute    PSV 10.0 cmH2O    PIP 0 cmH2O    IPAP 18     EPAP 8     Note      Notified Who RED BARFIELD RN     Notified By [id number]     Notified Time 03/22/2023 05:42     pH, Temp Corrected 7.316 pH Units    pCO2, Temperature Corrected 84.0 (H) 35 - 45 mm Hg    pO2, Temperature Corrected 77.9 (L) 83 - 108 mm Hg    CBC Auto Differential    Collection Time: 03/22/23  6:16 AM    Specimen: Blood   Result Value Ref Range    WBC 22.26 (H) 3.40 - 10.80 10*3/mm3    RBC 2.45 (L) 3.77 - 5.28 10*6/mm3    Hemoglobin 7.0 (L) 12.0 - 15.9 g/dL    Hematocrit 25.7 (L) 34.0 - 46.6 %    .9 (H) 79.0 - 97.0 fL    MCH 28.6 26.6 - 33.0 pg    MCHC 27.2 (L) 31.5 - 35.7 g/dL    RDW 16.5 (H) 12.3 - 15.4 %    RDW-SD 63.9 (H) 37.0 - 54.0 fl    MPV 9.9 6.0 - 12.0 fL    Platelets 192 140 - 450 10*3/mm3    Neutrophil % 91.5 (H) 42.7 - 76.0 %    Lymphocyte % 3.1 (L) 19.6 - 45.3 %    Monocyte % 4.4 (L) 5.0 - 12.0 %    Eosinophil % 0.0 (L) 0.3 - 6.2 %    Basophil % 0.1 0.0 - 1.5 %    Immature Grans % 0.9 (H) 0.0 - 0.5 %    Neutrophils, Absolute 20.38 (H) 1.70 - 7.00 10*3/mm3    Lymphocytes, Absolute 0.68 (L) 0.70 - 3.10 10*3/mm3    Monocytes, Absolute 0.98 (H) 0.10 - 0.90 10*3/mm3    Eosinophils, Absolute 0.00 0.00 - 0.40 10*3/mm3    Basophils, Absolute 0.03 0.00 - 0.20 10*3/mm3    Immature Grans, Absolute 0.19 (H) 0.00 - 0.05 10*3/mm3    nRBC 0.0 0.0 - 0.2 /100 WBC   Basic Metabolic Panel    Collection Time: 03/22/23  6:16 AM    Specimen: Blood   Result Value Ref Range    Glucose 159 (H) 65 - 99 mg/dL    BUN 24 (H) 8 - 23 mg/dL    Creatinine 1.26 (H) 0.57 - 1.00 mg/dL    Sodium 141 136 - 145 mmol/L    Potassium 4.7 3.5 - 5.2 mmol/L    Chloride 95 (L) 98 - 107 mmol/L    CO2 42.0 (H) 22.0 - 29.0 mmol/L    Calcium 7.8 (L) 8.6 - 10.5 mg/dL    BUN/Creatinine Ratio 19.0 7.0 - 25.0    Anion Gap 4.0 (L) 5.0 - 15.0 mmol/L    eGFR 46.0 (L) >60.0 mL/min/1.73   Magnesium    Collection Time: 03/22/23  6:16 AM    Specimen: Blood   Result Value Ref Range    Magnesium 3.2 (H) 1.6 - 2.4 mg/dL   Phosphorus    Collection Time: 03/22/23  6:16 AM    Specimen: Blood   Result Value Ref Range    Phosphorus 3.3 2.5 - 4.5 mg/dL   High Sensitivity Troponin T    Collection Time: 03/22/23  6:16 AM    Specimen: Blood   Result Value Ref Range    HS Troponin T 59 (C) <10 ng/L    Scan Slide    Collection Time: 03/22/23  6:16 AM    Specimen: Blood   Result Value Ref Range    Hypochromia Slight/1+ None Seen    Stomatocytes Slight/1+ None Seen    WBC Morphology Normal Normal    Platelet Morphology Normal Normal       If labs were ordered, I independently reviewed the results and considered them in treating the patient.        RADIOLOGY  CT Head Without Contrast    Result Date: 3/22/2023  EXAMINATION: CT HEAD WO CONTRAST DATE: 3/21/2023 10:46 PM  INDICATION: Altered mental status  COMPARISON: None available.  TECHNIQUE: Thin section noncontrast axial images were obtained through the head. Coronal reformats were created.  CT dose lowering techniques were used, to include: automated exposure control, adjustment for patient size, and or use of iterative reconstruction.  FINDINGS:  Intracranial contents: No acute intracranial hemorrhage, evidence of acute territorial infarct, mass, mass effect or hydrocephalus. Mild global cerebral volume loss. Mild chronic small vessel ischemic changes in the white matter and mild intracranial atherosclerosis. Bones and extracranial soft tissues:  No fracture or focal osseous lesion in the calvarium or skull base. Paranasal sinuses are clear where visualized. Mastoid air cells and middle ear cavities are clear bilaterally. Bilateral cataract surgery. No acute findings in the orbits. 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       No acute intracranial abnormality visible by CT.   This examination was interpreted by Sanjay Cast M.D. Electronically signed by:  Sanjay Cast M.D.  3/21/2023 10:31 PM Mountain Time    XR Chest 1 View    Result Date: 3/21/2023  XR CHEST 1 VW Date of Exam: 3/21/2023 9:40 PM EDT Indication: SOA triage protocol. Comparison: None available. Findings: There are patchy parenchymal opacities in the right upper and mid lung compatible with pneumonia. No pleural effusion. No pneumothorax. Normal cardiomediastinal  silhouette.     Impression: Right lung pneumonia. Electronically Signed: Derek Callahan  3/21/2023 11:15 PM EDT  Workstation ID: [id number]    CT Angiogram Chest Pulmonary Embolism    Result Date: 3/22/2023  Exam: CTA thorax, PE protocol Date: March 21, 2023 History: Shortness of breath Comparison: None available Technique: Contiguous axial CT images obtained of the thorax following the uneventful intravenous administration of 80 mL Isovue-370 contrast. Additionally, sagittal, coronal and 3-D reformatted images were obtained. CT dose lowering techniques were used, to include: automated exposure control, adjustment for patient size, and or use of iterative reconstruction. Findings: Thoracic aorta: There are moderate atherosclerotic changes. There is no aneurysm or dissection. HEART: The heart size is prominent. The right heart is asymmetrically enlarged. Pulmonary arteries: The pulmonary arteries are well visualized. There is no filling defect to suggest an acute pulmonary embolus. Mediastinum: There is a calcified mediastinal lymph nodes. There is an enlarged right hilar lymph node measuring 1.9 cm in short axis. There are additional borderline and mildly prominent right hilar lymph nodes. Lung parenchyma: There is a background of emphysema. There is alveolar and groundglass airspace disease in the right upper lobe consistent with pneumonia. There is a more lobular pleural-based mass in the inferior aspect of the right upper lobe measuring  4 x 3.2 cm on series 5 image 49. There is bronchial wall thickening with mild bronchiectasis in the right lower lobe. There are subtle areas of mucous plugging involving the bronchioles to the right lower lobe. There is a very small right-sided pleural effusion, loculated along the lateral aspect of the right lower lobe. There is miliary nodularity identified in both lungs. There are additional slightly larger pulmonary nodules in the left lower lobe, one of which measures 8 mm on  series 5 image 61. There is no pneumothorax. Upper abdomen: The gallbladder is surgically absent. There is a nonobstructing 6 mm right renal calculus. Osseous structures: There are advanced degenerative changes involving both shoulders. There are moderate degenerative changes in the spine. No acute fractures are identified.     1. Right upper lobe pneumonia. There is also a more lobular pleural-based mass in the inferior aspect of the right upper lobe measuring 4 x 3.2 cm. This could represent consolidative pneumonia or soft tissue neoplasm. Continuous radiographic follow-up to  complete resolution is mandatory. A short-term follow-up CT thorax after treatment is recommended. 2. Emphysema. 3. Bronchial wall thickening likely representing bronchiolitis. There is bronchiectasis in the right lower lobe. There are areas of mucous plugging involving the bronchioles to the right lower lobe. Differential considerations would include infectious bronchiolitis or an aspiration event. 4. Miliary type nodularity throughout both lungs most likely representing infectious bronchiolitis. Atypical infectious etiologies could also be considered, including fungal infections. 5. There are slightly larger pulmonary nodules in the left lower lobe, one of which measures 8 mm. These are favored to be infectious or inflammatory. Follow-up is recommended per Fleischner guidelines below. 6. Right hilar adenopathy. These lymph nodes could be reactive. Additional consideration would include metastatic disease. A follow-up PET/CT study could be considered for better characterization as determined clinically. 7. Cardiomegaly with asymmetric enlargement of the right heart. 8. Nonobstructing 6 mm right renal calculus. 9. Very small loculated right-sided pleural effusion. This could represent a small empyema. 10. No visualized pulmonary embolus. *FLEISCHNER SOCIETY GUIDELINES: Low risk patient: <6mm - Low Risk, no further f/u >6-8mm - low dose CT  6-12 mo, then 2nd f/u CT at 18-24mo >8mm - low dose CT 3,9,24mo OR PET/CT OR Biopsy High Risk Patient: <6mm - optional CT at 12 mo >6-8mm - low dose CT 6-12 mo, then 2nd f/u CT at 18-24mo >8mm - low dose CT at 3 mo, OR PET/CT OR Biopsy MULTIPLE NODULES: Low Risk Patient: <6mm - Low Risk, no further f/u >6-8mm - low dose CT 3-6 mo, then 2nd f/u CT at 18-24mo >8mm - low dose CT 3-6 mo, then 2nd f/u CT at 18-24mo High Risk Patient: <6mm - High risk, multiple nodules, optional CT at 12 mo >6-8mm - low dose CT 3-6 mo, then 2nd f/u CT at 18-24mo >8mm - low dose CT 3-6 mo, then 2nd f/u CT at 18-24mo Slot 63 Electronically signed by:  Chandan Yañez M.D.  3/21/2023 10:39 PM Mountain Time      I ordered and independently reviewed the above noted radiographic studies.      I viewed images of chest x-ray which showed edema per my independent interpretation.    See radiologist's dictation for official interpretation.        PROCEDURES    Critical Care  Performed by: Júnior Flores MD  Authorized by: Júnior Flores MD     Critical care provider statement:     Critical care time (minutes):  40    Critical care end time:  3/21/2023 10:29 PM        ECG 12 Lead Altered Mental Status   Preliminary Result   Test Reason : Altered Mental Status   Blood Pressure :   */*   mmHG   Vent. Rate :  90 BPM     Atrial Rate :  90 BPM      P-R Int : 132 ms          QRS Dur :  66 ms       QT Int : 380 ms       P-R-T Axes :  68  41  63 degrees      QTc Int : 464 ms      Normal sinus rhythm   Possible Left atrial enlargement   Borderline ECG   No previous ECGs available      Referred By: EDMD           Confirmed By:       ECG 12 Lead ED Triage Standing Order; SOA    (Results Pending)       MEDICATIONS GIVEN IN ER    Medications   sodium chloride 0.9 % flush 10 mL (has no administration in time range)   ipratropium-albuterol (DUO-NEB) nebulizer solution 3 mL (3 mL Nebulization Not Given 3/21/23 2159)   Potassium Replacement - Follow Nurse  / BPA Driven Protocol (has no administration in time range)   piperacillin-tazobactam (ZOSYN) 3.375 g in iso-osmotic dextrose 50 ml (premix) (3.375 g Intravenous New Bag 3/22/23 0623)   Pharmacy to dose vancomycin (has no administration in time range)   sodium chloride 0.9 % flush 10 mL (has no administration in time range)   sodium chloride 0.9 % infusion 40 mL (has no administration in time range)   ipratropium-albuterol (DUO-NEB) nebulizer solution 3 mL (3 mL Nebulization Given 3/22/23 0814)   budesonide (PULMICORT) nebulizer solution 0.5 mg ( Nebulization Canceled Entry 3/22/23 0930)   sodium chloride 0.9 % infusion (75 mL/hr Intravenous Currently Infusing 3/22/23 0608)   methylPREDNISolone sodium succinate (SOLU-Medrol) injection 20 mg (has no administration in time range)   pantoprazole (PROTONIX) injection 40 mg (40 mg Intravenous Given 3/22/23 0620)   heparin (porcine) 5000 UNIT/ML injection 5,000 Units (5,000 Units Subcutaneous Given 3/22/23 0629)   Magnesium Standard Dose Replacement - Follow Nurse / BPA Driven Protocol (has no administration in time range)   vancomycin (dosing per levels) (has no administration in time range)   sodium chloride 0.9 % bolus 1,000 mL (0 mL Intravenous Stopped 3/21/23 2305)   methylPREDNISolone sodium succinate (SOLU-Medrol) injection 125 mg (125 mg Intravenous Given 3/21/23 2144)   piperacillin-tazobactam (ZOSYN) 3.375 g in iso-osmotic dextrose 50 ml (premix) (0 g Intravenous Stopped 3/21/23 2334)   AZITHROMYCIN 500 MG/250 ML 0.9% NS IVPB (vial-mate) (0 mg Intravenous Stopped 3/22/23 0033)   iopamidol (ISOVUE-370) 76 % injection 100 mL (100 mL Intravenous Given 3/21/23 2251)   potassium chloride 10 mEq in 100 mL IVPB (0 mEq Intravenous Stopped 3/22/23 0608)   sodium chloride 0.9 % bolus 1,000 mL (0 mL Intravenous Stopped 3/22/23 0036)   vancomycin 1250 mg/250 mL 0.9% NS IVPB ([facility]) (0 mg Intravenous Stopped 3/22/23 0207)   magnesium sulfate 4g/100mL (PREMIX) infusion (0 g  Intravenous Stopped 3/22/23 0216)   albumin human 5 % solution 500 mL (0 mL Intravenous Stopped 3/22/23 0157)         MEDICAL DECISION MAKING, PROGRESS, and CONSULTS  72-year-old female presenting from home with hypoxia/hypoxemia.  Review of the medical records does show a history of emphysema, and home oxygen use.  She is lethargic here, and has had 15 L nonrebreather applied by EMS, I am suspecting hypercarbic/hypoxic combined respiratory failure with her confusion.    She is also slightly hypotensive.      All labs have been independently reviewed by me.  All radiology studies have been reviewed by me and the radiologist dictating the report.  All EKG's have been independently viewed and interpreted by me.      Discussion below represents my analysis of pertinent findings related to patient's condition, differential diagnosis, treatment plan and final disposition.      Differential diagnosis:    Pneumonia, COPD, respiratory failure, hypercarbia, stroke.            Orders placed during this visit:  Orders Placed This Encounter   Procedures   • Critical Care   • Blood Culture - Blood,   • Blood Culture - Blood,   • COVID PRE-OP / PRE-PROCEDURE SCREENING ORDER (NO ISOLATION) - Swab, Nasopharynx   • Respiratory Panel PCR w/COVID-19(SARS-CoV-2) DANETTE/RENETTA/NIKKI/PAD/COR/MAD/LAVONNE In-House, NP Swab in UTM/VTM, 3-4 HR TAT - Swab, Nasopharynx   • MRSA Screen, PCR (Inpatient) - Swab, Nares   • Legionella Antigen, Urine - Urine, Urine, Clean Catch   • S. Pneumo Ag Urine or CSF - Urine, Urine, Clean Catch   • XR Chest 1 View   • CT Angiogram Chest Pulmonary Embolism   • CT Head Without Contrast   • [place] Draw   • Comprehensive Metabolic Panel   • BNP   • Single High Sensitivity Troponin T   • CBC Auto Differential   • Blood Gas, Arterial -With Co-Ox Panel: Yes   • Lactic Acid, Plasma   • D-dimer, Quantitative   • Blood Gas, Arterial With Co-Ox   • STAT Lactic Acid, Reflex   • Urinalysis With Culture If Indicated - Straight Cath    • Urinalysis, Microscopic Only - Urine, Clean Catch   • Procalcitonin   • Magnesium   • Cortisol   • CBC Auto Differential   • Basic Metabolic Panel   • Magnesium   • Phosphorus   • Vitamin B12   • Folate   • TSH Rfx On Abnormal To Free T4   • Iron Profile   • High Sensitivity Troponin T   • Blood Gas, Arterial -With Co-Ox Panel: Yes   • Blood Gas, Arterial With Co-Ox   • Vancomycin, Random   • Blood Gas, Arterial With Co-Ox   • Scan Slide   • High Sensitivity Troponin T 2Hr   • NPO Diet NPO Type: Strict NPO   • Undress & Gown   • Cardiac Monitoring   • Vital Signs   • Vital Signs Every Hour and Per Hospital Policy Based on Patient Condition   • Continuous Pulse Oximetry   • Height & Weight   • Daily Weights   • Intake & Output   • Oral Care - Patient Not on NPPV & Not Intubated   • Use Mobility Guidelines for Advancement of Activity   • Saline Lock & Maintain IV Access   • Place Sequential Compression Device   • Maintain Sequential Compression Device   • Oxygen Therapy- Nasal Cannula; 2 LPM; Titrate for SPO2: equal to or greater than, 92%   • NIPPV (CPAP or BIPAP)   • SLP Consult: Eval & Treat RN Dysphagia Screen Failed   • POC Glucose STAT   • POC Glucose Once   • ECG 12 Lead ED Triage Standing Order; SOA   • ECG 12 Lead Altered Mental Status   • Adult Transthoracic Echo Complete W/ Cont if Necessary Per Protocol   • Insert Peripheral IV   • ICU / CCU Bed Request (NIKKI / RENETTA ONLY)   • CBC & Differential   • Green Top (Gel)   • Lavender Top   • Gold Top - SST   • Gray Top   • Light Blue Top             ED Course:    Consultants:  Dr perez Intensivist     ED Course as of 03/22/23 0823   Tue Mar 21, 2023   2140 Laboratory test, as well as x-ray and CT ordered for evaluation for pneumonia, COPD, acidosis, pulmonary embolism with reviewed history of malignancy in the past. [TA]   2140 I have ordered 1 L IV fluid bolus, as well as methylprednisone and neb treatment for COPD treatment [TA]   2229 I have ordered IV  fluid boluses, as well as antibiotics for pneumonia coverage.  I did speak to  for critical care consultation  [TA]   Wed Mar 22, 2023   0820 blood gas is significant for hypercarbia with slight acidosis, indicative of respiratory failure [TA]   0820 a positive pressure ventilation was initiated in the ED.  Patient did show some improvement on a recheck later, blood gases followed with some improvement in the second 6 PCO2. [TA]   0821 CT scan of the brain shows no acute infarct or intracranial hemorrhage, CT scan of the chest does show infiltrates, consistent with pneumonia, as well as other lesions, there is no pulmonary embolism considering the significant hypoxia noted by EMS. [TA]      ED Course User Index  [TA] Júnior Flores MD                  AS OF 08:23 EDT VITALS:    BP - 103/52  HR - 90  TEMP - 99 °F (37.2 °C) (Axillary)  O2 SATS - 96%                  DIAGNOSIS  Final diagnoses:   Acute respiratory failure with hypoxia and hypercarbia (HCC)   Lethargy   Hypoxia   Stage 4 very severe COPD by GOLD classification   AoC respiratory failure with hypoxia and hypercapnia         DISPOSITION  Admit       Please note that portions of this document were completed with voice recognition software.      Júnior Flores MD  03/22/23 0823

## 2023-03-22 NOTE — CASE MANAGEMENT/SOCIAL WORK
Discharge Planning Assessment  [facility]     Patient Name: Sharmila Delarosa  MRN: [id number]  Today's Date: 3/22/2023    Admit Date: 3/21/2023    Plan: Home   Discharge Needs Assessment     Row Name 03/22/23 1224       Living Environment    People in Home child(comfort), adult    Current Living Arrangements home    Primary Care Provided by self    Provides Primary Care For no one    Family Caregiver if Needed child(comfort), adult    Family Caregiver Names Christian Delarosa, madhu    Quality of Family Relationships helpful;involved;supportive    Able to Return to Prior Arrangements yes       Transition Planning    Patient/Family Anticipates Transition to home with family    Patient/Family Anticipated Services at Transition        Discharge Needs Assessment    Equipment Currently Used at Home oxygen;cane, straight    Concerns to be Addressed denies needs/concerns at this time    Equipment Needed After Discharge none    Discharge Coordination/Progress Lives in a duplex in [place] with her son Christian, independent of ADLs. Has a cane that she sometimes uses, also has oxygen.  Has had home health but Christian did not remember the name of the company.  Christian is power of .               Discharge Plan     Row Name 03/22/23 1227       Plan    Plan Home    Patient/Family in Agreement with Plan yes    Plan Comments I spoke with patient's son Christian over the phone as Ms Delarosa is currently on bipap.  Ms Delarosa resides in a house in [place] with her son Christian and is normally independent of ADLs.  Her DME includes oxygen and a cane.  Christian states that she will use the cane when she feels like she needs it.  She has had home health in the past, but Christian did not remember the name of the company, stating that they were from [place]. Christian is her power of .  Her PCP is Bala Gomez, and she gets her prescriptions [facility] off of [address] in [place].  At this  time, there are no discharge needs.    Final Discharge Disposition Code 01 - home or self-care              Continued Care and Services - Admitted Since 3/21/2023    Coordination has not been started for this encounter.          Demographic Summary    No documentation.                Functional Status     Row Name 03/22/23 1224       Functional Status    Usual Activity Tolerance moderate    Current Activity Tolerance moderate       Functional Status, IADL    Medications independent    Meal Preparation independent    Housekeeping independent    Laundry independent    Shopping independent               Psychosocial    No documentation.                Abuse/Neglect    No documentation.                Legal    No documentation.                Substance Abuse    No documentation.                Patient Forms    No documentation.                   Laila Winter RN

## 2023-03-22 NOTE — SIGNIFICANT NOTE
03/22/23 1138   SLP Deferred Reason   SLP Deferred Reason   (Per chart, pt on Bipap and not appropriate to attempt dysphagia eval. SLP will check back tomorrow for status. If any improvement and needed today, please notify on [phone]. Thanks.)

## 2023-03-22 NOTE — PROGRESS NOTES
INTENSIVIST / PULMONARY FOLLOW UP NOTE     Hospital:  LOS: 0 days   Ms. Sharmila Delarosa, 72 y.o. female is followed for:     AoC respiratory failure with hypoxia and hypercapnia    Stage 4 very severe COPD by GOLD classification    Bronchiectasis    Pulmonary nodule    RA (rheumatoid arthritis)    On chronic immunosuppression therapy    JORGITO    GERD    UTI (urinary tract infection)    Acute respiratory failure with hypoxia and hypercarbia (HCC)          SUBJECTIVE   Conversant this afternoon on bipap    The patient's relevant past medical, surgical, family, and social history were reviewed    Allergies and medications were reviewed    ROS:    Per subjective, all other systems were reviewed and were negative        OBJECTIVE     Vital Sign Min/Max for last 24 hours:  Temp  Min: 97.7 °F (36.5 °C)  Max: 99 °F (37.2 °C)   BP  Min: 81/61  Max: 149/66   Pulse  Min: 78  Max: 115   Resp  Min: 22  Max: 28   SpO2  Min: 80 %  Max: 100 %   No data recorded     Physical Exam:  General Appearance:  No acute distress  Eyes:  No scleral icterus or pallor, pupils normal  Ears, Nose, Mouth, Throat:  Atraumatic, oropharynx clear  Neck:  Trachea midline, thyroid normal  Respiratory:  Clear to auscultation bilaterally, normal effort  Cardiovascular:  Regular rate and rhythm, no murmurs, no peripheral edema  Gastrointestinal:  Soft, non-tender, non-distended, no hepatosplenomegaly  Skin:  Normal temperature, no rash  Psychiatric:  No agitation  Neuro:  No new focal neurologic deficits observed    Telemetry:              Hemodynamics:   CVP:     PAP:     PAOP:     CO:     CI:     SVI:     SVR:       SpO2: 100 % SpO2  Min: 80 %  Max: 100 %   Device:      Flow Rate:   No data recorded     Mechanical Ventilator Settings:                                         Intake/Ouptut 24 hrs (7:00AM - 6:59 AM)  Intake & Output (last 3 days)       03/19 0701  03/20 0700 03/20 0701  03/21 0700 03/21 0701  03/22 0700 03/22 0701  03/23 0700    I.V.  (mL/kg)   100 (1.7)     IV Piggyback   3650 50    Total Intake(mL/kg)   3750 (63.6) 50 (0.8)    Urine (mL/kg/hr)   100     Total Output   100     Net   +3650 +50                  Lines, Drains & Airways     Active LDAs     Name Placement date Placement time Site Days    Peripheral IV 03/21/23 2129 Right Antecubital 03/21/23 2129  Antecubital  less than 1                Hematology:  Results from last 7 days   Lab Units 03/22/23  0616 03/21/23 2129   WBC 10*3/mm3 22.26* 27.74*   HEMOGLOBIN g/dL 7.0* 8.6*   HEMATOCRIT % 25.7* 32.1*   PLATELETS 10*3/mm3 192 251     Electrolytes, Magnesium and Phosphorus:  Results from last 7 days   Lab Units 03/22/23  0616 03/21/23 2129   SODIUM mmol/L 141 145   CHLORIDE mmol/L 95* 88*   POTASSIUM mmol/L 4.7 3.1*   CO2 mmol/L 42.0* >50.0*   MAGNESIUM mg/dL 3.2* 0.8*   PHOSPHORUS mg/dL 3.3  --      Renal:  Results from last 7 days   Lab Units 03/22/23  0616 03/21/23 2129   CREATININE mg/dL 1.26* 1.23*   BUN mg/dL 24* 24*     Estimated Creatinine Clearance: 38.7 mL/min (A) (by C-G formula based on SCr of 1.26 mg/dL (H)).  Hepatic:  Results from last 7 days   Lab Units 03/21/23 2129   ALK PHOS U/L 80   BILIRUBIN mg/dL 0.7   ALT (SGPT) U/L 9   AST (SGOT) U/L 17     Arterial Blood Gases:  Results from last 7 days   Lab Units 03/22/23  1654 03/22/23  0541 03/21/23  2152   PH, ARTERIAL pH units 7.301* 7.316* 7.376   PCO2, ARTERIAL mm Hg 88.8* 84.0* 93.2*   PO2 ART mm Hg 106.0 77.9* 173.0*   FIO2 % 45 45 80             Lab Results   Component Value Date    LACTATE 1.6 03/22/2023       Relevant imaging studies and labs from 03/22/23 were reviewed    Medications (drips):  Pharmacy to dose vancomycin  phenylephrine  sodium chloride, Last Rate: 75 mL/hr (03/22/23 6092)        budesonide, 0.5 mg, Nebulization, BID - RT  heparin (porcine), 5,000 Units, Subcutaneous, Q8H  ipratropium-albuterol, 3 mL, Nebulization, Once  ipratropium-albuterol, 3 mL, Nebulization, 4x Daily - RT  methylPREDNISolone  sodium succinate, 20 mg, Intravenous, Q12H  pantoprazole, 40 mg, Intravenous, Q AM  piperacillin-tazobactam, 3.375 g, Intravenous, Q8H  vancomycin (dosing per levels), , Does not apply, Daily        •  Magnesium Standard Dose Replacement - Follow Nurse / BPA Driven Protocol  •  Pharmacy to dose vancomycin  •  Potassium Replacement - Follow Nurse / BPA Driven Protocol  •  sodium chloride  •  sodium chloride  •  sodium chloride    Assessment & Plan   IMPRESSION / PLAN     Inpatient Problem List:  72 y.o.female:  Active Hospital Problems    Diagnosis    • **AoC respiratory failure with hypoxia and hypercapnia    • Acute respiratory failure with hypoxia and hypercarbia (HCC)    • RA (rheumatoid arthritis)    • On chronic immunosuppression therapy    • JORGITO    • GERD    • UTI (urinary tract infection)    • Stage 4 very severe COPD by GOLD classification    • Pulmonary nodule    • Bronchiectasis         Hospital Course:  72 y.o.female with relevant PMH of former smoker, COPD / chronic respiratory failure on home O2 (2L), bronchiectasis, LLL pulmonary nodule, RA, chronic immunosuppression therapy on prednisone, MTX, & Plaquenil, right breast CA s/p lumpectomy with adjuvant chemo & XRT (2015), and GERD admitted 3/21/2023 after being found poorly responsive with sat of 70% on RA.  Found to have severe mostly chronic hypercapnic respiratory failure and acute on chronic hypoxemic respiratory failure.    Felt to be septic as well from PNA vs UTI.    Developed Afib, reportedly new.    Impression:  BP better, though PCO2 markedly elevated, able to converse and alert on bipap.    Plan:  -continue scheduled nebs, pulmicort  -IV continue IV vanco and pip-tazo  -gentle IVF  -Poli as needed to keep MAP > 65  -follow up cultures  -give breaks off of bipap as able  -DVT / PUD prophylaxis    Palliative consult, appears to have end stage obstructive lung disease    Nutrition  Dietary Orders (From admission, onward)     Start     Ordered     03/21/23 2340  NPO Diet NPO Type: Strict NPO  Diet Effective Now        Comments: Should Remain in Effect Until a Complete SLP Evaluation Occurs & a Superceding Order is Received   Question:  NPO Type  Answer:  Strict NPO    03/21/23 7023                  Plan of care and goals reviewed with multidisciplinary team at daily rounds    Critical Care time spent in direct patient care: 35 minutes (excluding procedure time, if applicable) including high complexity decision making to assess, manipulate, and support vital organ system failure in this individual who has impairment of one or more vital organ systems such that there is a high probability of imminent or life threatening deterioration in the patient’s condition.       Carl Verma MD  Intensive Care Medicine  03/22/23 17:00 EDT

## 2023-03-22 NOTE — H&P
Intensive Care Admission Note     Acute on chronic respiratory failure with hypoxia and hypercapnia (HCC)    History of Present Illness     Sharmila Delarosa is a 72 y.o. female who presents to [facility] ED 03/21/23 with respiratory failure.     Patient has a PMH of former smoker, COPD / chronic respiratory failure on home O2 (2L), bronchiectasis, LLL pulmonary nodule, RA, chronic immunosuppression therapy on prednisone, MTX, & Plaquenil, right breast CA s/p lumpectomy with adjuvant chemo & XRT (2015), and GERD.  She completed the Pfizer COVID-19 vaccination series + booster dose on 4/10/22.    Per report, this afternoon patient's son returned home from work when he found her slumped over in the recliner poorly responsive, prompting call to EMS. Upon their arrival she was noted to be hypoxic with SpO2 70% on RA and was placed on NRB mask with improvement in oxygenation and mentation.  She was brought to our ED where upon arrival ABG demonstrated pH 7.37 / pCO2 93.2 / HCO3 54.6.  Labs were notable for HS troponin 42, proBNP 3834, K 3.1, sCr 1.23, BUN 24, lactate 2.5, WBC 27.74, Hgb 8.6 and Hct 32.1. UA with 4+ bacteria, trace leukocytes, and 3-5 WBC. Respiratory PCR testing w/COVID-19 negative. CTH and CTA chest are currently pending.  In the ED she received a DuoNeb and 125 mg IV Solu-Medrol. Due to septic concern BC x2 were drawn, she was initiated on IVF (1L NS), and received empiric doses of Zosyn and Azithromycin. Ms. Delarosa will be admitted to ICU for further management.     Time spent: 15 minutes  Electronically signed by Irene Gomez, MAYRA, APRN, 03/21/23, 11:01 PM EDT.     Problem List, Surgical History, Family, Social History, and ROS     Patient Active Problem List    Diagnosis    • *AoC respiratory failure with hypoxia and hypercapnia [J96.21, J96.22]    • JORGITO [N17.9]    • RA (rheumatoid arthritis) [M06.9]    • On chronic immunosuppression therapy [D84.9]    • Stage 4 very severe COPD by GOLD  classification [J44.9]    • Bronchiectasis [J47.9]    • GERD [K21.9]    • Pulmonary nodule [R91.1]    • Chronic respiratory failure with hypoxia (HCC) [J96.11]    • Former smoker [Z87.891]      Past Surgical History:   Procedure Laterality Date   • BREAST BIOPSY Right 2015    X 3 BX's   • BREAST LUMPECTOMY         Allergies   Allergen Reactions   • Cephalosporins Unknown - Low Severity     No current facility-administered medications on file prior to encounter.     Current Outpatient Medications on File Prior to Encounter   Medication Sig   • alendronate (FOSAMAX) 70 MG tablet TAKE 1 TABLET BY MOUTH ONE TIME A WEEK   • Allegra-D Allergy & Congestion  MG per 12 hr tablet Take 1 tablet by mouth Daily As Needed.   • ascorbic acid (VITAMIN C) 100 MG tablet Take 100 mg by mouth Daily.   • budesonide (Pulmicort) 0.5 MG/2ML nebulizer solution Take 2 mL by nebulization 2 (Two) Times a Day.   • calcium carbonate-cholecalciferol 500-400 MG-UNIT tablet tablet Take 1 tablet by mouth Daily.   • esomeprazole (nexIUM) 40 MG capsule Take 40 mg by mouth Daily.   • FERROUS GLUCONATE IRON PO Take 225 mg by mouth 2 (Two) Times a Day.   • folic acid (FOLVITE) 1 MG tablet Take 1 mg by mouth Daily.   • formoterol (Perforomist) 20 MCG/2ML nebulizer solution Take 2 mL by nebulization 2 (Two) Times a Day.   • hydroxychloroquine (PLAQUENIL) 200 MG tablet Take 200 mg by mouth Daily.   • methotrexate 2.5 MG tablet Take 2.5 mg by mouth 1 (One) Time Per Week.   • metoprolol succinate XL (TOPROL-XL) 50 MG 24 hr tablet Take 50 mg by mouth Daily.   • multivitamin with minerals tablet tablet Take 1 tablet by mouth Daily.   • oxybutynin (DITROPAN) 5 MG tablet Take 5 mg by mouth 2 (Two) Times a Day.   • predniSONE (DELTASONE) 10 MG tablet Take 10 mg by mouth Daily.   • revefenacin (Yupelri) 175 MCG/3ML nebulizer solution Take 3 mL by nebulization Daily.   • sertraline (ZOLOFT) 100 MG tablet Take 100 mg by mouth Daily.   • tamoxifen (NOLVADEX) 20  "MG chemo tablet Take  by mouth Daily.   • tiotropium bromide monohydrate (Spiriva Respimat) 2.5 MCG/ACT aerosol solution inhaler Inhale 2 puffs Daily.     MEDICATION LIST AND ALLERGIES REVIEWED.    Family History   Problem Relation Age of Onset   • Aortic stenosis Mother    • Melanoma Father    • Ovarian cancer Sister    • Breast cancer Sister    • Rectal cancer Sister    • Breast cancer Maternal Grandmother    • Breast cancer Daughter      Social History     Tobacco Use   • Smoking status: Former     Packs/day: 1.00     Years: 40.00     Pack years: 40.00     Types: Cigarettes     Quit date:      Years since quittin.2   • Smokeless tobacco: Never   Vaping Use   • Vaping Use: Never used   Substance Use Topics   • Alcohol use: Never   • Drug use: Never     Social History     Social History Narrative   • Not on file     FAMILY AND SOCIAL HISTORY REVIEWED.    Review of Systems   Unable to perform ROS as pt quite lethargic and on Bipap    Physical Exam and Clinical Information   BP 97/42 (BP Location: Left arm, Patient Position: Sitting)   Pulse 114   Temp 99 °F (37.2 °C) (Axillary)   Resp 24   Ht 167.6 cm (66\")   Wt 59 kg (130 lb)   SpO2 100%   BMI 20.98 kg/m²   Physical Exam  General Appearance: Sleepy lethargic on BiPAP, wakes up to verbal generalized weak  Head:   Pupils reactive & symmetrical B/L.  Neck:   Supple.   Lungs:   B/L Breath sounds present with decreased breath sounds on bases, no wheezing heard, right side crackles.   Heart: S1 and S2 present, no murmur  Abdomen: Soft, nontender, no guarding or rigidity, bowel sounds positive.  Extremities:  no cyanosis or clubbing,  no edema, warm to touch.  Pulses: Positive and symmetric.  Neurologic:  Moving all four extremities. Generalized weak. Not able to participate in full neurological exam  Psychological: lethargic    Results from last 7 days   Lab Units 239   WBC 10*3/mm3 27.74*   HEMOGLOBIN g/dL 8.6*   PLATELETS 10*3/mm3 251 "     Results from last 7 days   Lab Units 03/21/23  2129   SODIUM mmol/L 145   POTASSIUM mmol/L 3.1*   CO2 mmol/L >50.0*   BUN mg/dL 24*   CREATININE mg/dL 1.23*   GLUCOSE mg/dL 164*     Estimated Creatinine Clearance: 39.6 mL/min (A) (by C-G formula based on SCr of 1.23 mg/dL (H)).      Results from last 7 days   Lab Units 03/21/23  2152   PH, ARTERIAL pH units 7.376   PCO2, ARTERIAL mm Hg 93.2*   PO2 ART mm Hg 173.0*     Lab Results   Component Value Date    LACTATE 2.5 (C) 03/21/2023        Images: CXR reviewed and showed consolidative patchy infiltrates in the right lung field.  There are some opacities in the left lingula as well.  No pneumothorax, no pleural effusion.    I reviewed the patient's results and images.     CT chest pending.       Impression     AoC respiratory failure with hypoxia and hypercapnia    JORGITO    Stage 4 very severe COPD by GOLD classification    Bronchiectasis    RA (rheumatoid arthritis)    On chronic immunosuppression therapy    Pulmonary nodule    GERD    Plan/Recommendations     72-year-old female has past medical history of smoking with very severe COPD with FEV1 of 23% and 0.56 L,  significant bronchiectasis, 2-3 L supplemental oxygen at home chronically, rheumatoid arthritis on chronic prednisone therapy, methotrexate and Plaquenil, previous history of right breast cancer status postlumpectomy with adjuvant chemo and XRT and GERD.  Patient lives with her son.  History is obtained from patient's son who states that patient has been feeling tired for 2 days.  He has been getting progressively groggy and lethargic.  States that generally she hacks and coughs but has not been doing that for past couple of days.  No fevers.  No other sick contacts.  She ate her breakfast earlier today morning but when patient's son came back from work he found her slumped over.  Oxygen saturations were in the 60% range on 3 L nasal cannula oxygen.  Patient apparently also had episode of stool  incontinence.  EMS was called and patient was brought into ER on nonrebreather mask.  In the ER patient underwent further work-up and blood gas showed severe chronic hypercapnia with PCO2 of 93 but pH of 7.37.  Negative troponin, mildly elevated proBNP level, BUN of 24, mild lactic acidosis of 2.5, WBC count of 27k.  Chest x-ray suggestive of right-sided pneumonia.  Due to increased work of breathing patient was placed on BiPAP and seems to be tolerating that well.  Received 2 L of fluid boluses and despite that borderline hypotensive so we were asked to admit patient to intensive care unit.  Patient on BiPAP, tolerating okay.  Wakes up to verbal command and following some simple commands but remains quite lethargic.    1.  Admit to intensive care unit with severe hypoxemic and hypercapnic respiratory failure and hypotension with likely ongoing sepsis from pneumonia   2.  Respiratory viral panel negative.  Will send urine Legionella antigen as well as strep antigen.  Check MRSA screen.  No recent hospitalization.  No antibiotic use but patient is immune compromised with rheumatoid arthritis and on immunosuppressive drugs.  Will cover broadly with vancomycin and Zosyn for now.  Aspiration is another possibility and should be covered with current antibiotics.  Blood cultures have been obtained.  Urinalysis is also concerning for UTI and should be covered with Zosyn therapy as well.  Will adjust antibiotics based on culture data.  3.  DuoNeb nebulizers every 4 hours.  Pulmicort nebulizer twice a day.  We will increase her steroid dosing to Solu-Medrol 20 mg every 12 for now and monitor status closely.  4.  Has received 2 L of fluid boluses.  We will continue normal saline at 75 mL/h.  Monitor urine output and electrolytes closely.  Monitor renal function closely.  Replace electrolytes per ICU protocol.  Blood pressure is borderline.  We will continue IV fluids.  Use boluses as needed.  BNP is elevated which could be  secondary to underlying pulmonary hypertension.  5.  CTA chest and CT head pending and we will follow-up on those results.  6.  Needs med reconciliation.  7.  GI prophylaxis and DVT prophylaxis.  8.  Patient is anemic and hemoglobin slightly worse compared to September and currently at 8.6.  No acute bleed noted.  MCV is elevated.  Will check vitamin B12 level and iron studies.  9.  Check thyroid studies.  10.  Get EKG.  Echocardiogram in the morning.  11.  N.p.o. for now.    Labs in the morning.    Met with patient's son and updated him about current condition and overall guarded prognosis.  He verbalized understanding.  Discussed that if patient gets any further worse then may need to be intubated and mechanically ventilated.  He wants to keep patient full code as they have not had discussions in the past regarding CODE STATUS.  Discussed that if she gets on mechanical ventilation then due to her underlying severe lung disease it will very difficult to get her off  the ventilator.  He was able to understand that.  We will continue ongoing discussions regarding goals of care.    Time spent Critical care 40 min (exclusive of procedure time)  including high complexity decision making to assess, manipulate, and support vital organ system failure in this individual who has impairment of one or more vital organ systems such that there is a high probability of imminent or life threatening deterioration in the patient’s condition.    Justice Kraft MD, [facility]  Pulmonary and Critical Care Medicine     CC: Bala Gomez MD

## 2023-03-23 NOTE — THERAPY EVALUATION
Acute Care - Speech Language Pathology   Swallow Re-Evaluation [facility]     Clinical Swallow Evaluation       Patient Name: Sharmila Delarosa  : 1950  MRN: [id number]  Today's Date: 3/23/2023               Admit Date: 3/21/2023    Visit Dx:     ICD-10-CM ICD-9-CM   1. Acute respiratory failure with hypoxia and hypercarbia (HCC)  J96.01 518.81    J96.02    2. Lethargy  R53.83 780.79   3. Hypoxia  R09.02 799.02   4. Stage 4 very severe COPD by GOLD classification  J44.9 496   5. AoC respiratory failure with hypoxia and hypercapnia  J96.21 518.84    J96.22 786.09     799.02   6. Dysphagia, unspecified type  R13.10 787.20     Patient Active Problem List   Diagnosis   • Stage 4 very severe COPD by GOLD classification   • Bronchiectasis   • Chronic respiratory failure with hypoxia (HCC)   • Pulmonary nodule   • Former smoker   • AoC respiratory failure with hypoxia and hypercapnia   • RA (rheumatoid arthritis)   • On chronic immunosuppression therapy   • JORGITO   • GERD   • UTI (urinary tract infection)   • Acute respiratory failure with hypoxia and hypercarbia (HCC)     Past Medical History:   Diagnosis Date   • Anxiety    • Breast cancer (HCC) 2015    Right breast    • Depression    • Drug therapy    • GERD 3/21/2023   • Hx of radiation therapy    • On chronic immunosuppression therapy 3/21/2023   • RA (rheumatoid arthritis) 3/21/2023     Past Surgical History:   Procedure Laterality Date   • BREAST BIOPSY Right 2015    X 3 BX's   • BREAST LUMPECTOMY         SLP Recommendation and Plan  SLP Swallowing Diagnosis: functional oral phase, suspected pharyngeal dysphagia (23 1255)  SLP Diet Recommendation: NPO, other (see comments) (4-5 ice chips after oral care with supervision) (23 1255)  Recommended Precautions and Strategies: upright posture during/after eating, general aspiration precautions (23 1255)  SLP Rec. for Method of Medication Administration: meds via alternate route (23 1255)      Monitor for Signs of Aspiration: notify SLP if any concerns (03/23/23 1255)  Recommended Diagnostics: FEES (03/23/23 1255)  Swallow Criteria for Skilled Therapeutic Interventions Met: demonstrates skilled criteria (03/23/23 1255)  Anticipated Discharge Disposition (SLP): unknown, anticipate therapy at next level of care (03/23/23 1255)  Rehab Potential/Prognosis, Swallowing: good, to achieve stated therapy goals (03/23/23 1255)     Predicted Duration Therapy Intervention (Days): until discharge (03/23/23 1255)                                               SWALLOW EVALUATION (last 72 hours)     SLP Adult Swallow Evaluation     Row Name 03/23/23 1255                   Rehab Evaluation    Document Type evaluation  -CH        Subjective Information no complaints  -CH        Patient Observations cooperative;agree to therapy;lethargic  -        Patient/Family/Caregiver Comments/Observations none present  -CH        Patient Effort adequate  -CH           General Information    Patient Profile Reviewed yes  -CH        Pertinent History Of Current Problem acute resp failure, hypoxia, R PNA on CXR, CT head neg.  -CH        Current Method of Nutrition NPO  -CH        Prior Level of Function-Swallowing unknown  -        Plans/Goals Discussed with patient;agreed upon  -        Barriers to Rehab none identified  -           Pain    Additional Documentation Pain Scale: FACES Pre/Post-Treatment (Group)  -CH           Pain Scale: FACES Pre/Post-Treatment    Pain: FACES Scale, Pretreatment 0-->no hurt  -CH        Posttreatment Pain Rating 0-->no hurt  -CH           Oral Motor Structure and Function    Dentition Assessment natural, present and adequate  -CH        Secretion Management WNL/WFL  -CH        Mucosal Quality dry  -CH        Volitional Swallow delayed  -CH        Volitional Cough weak  -CH           Oral Musculature and Cranial Nerve Assessment    Oral Motor General Assessment generalized oral motor weakness;oral  labial or buccal impairment  -        Oral Labial or Buccal Impairment, Detail, Cranial Nerve VII (Facial): left labial droop  -           General Eating/Swallowing Observations    Respiratory Support Currently in Use high-flow nasal cannula  -        O2 Liters other (see comments)  45  -CH        Eating/Swallowing Skills fed by SLP  -        Positioning During Eating upright 90 degree;upright in bed  -        Utensils Used spoon;cup;straw  -        Consistencies Trialed ice chips;thin liquids;pureed;nectar/syrup-thick liquids  -        Pre SpO2 (%) 95  -CH        Post SpO2 (%) 96  -CH           Respiratory    Respiratory Status WFL  -CH           Clinical Swallow Eval    Oral Prep Phase WFL  -CH        Pharyngeal Phase suspected pharyngeal impairment  -        Clinical Swallow Evaluation Summary Multiple swallows with all consistency trials.  Wet vocal quality with thin liquids by spoon, cup and straw. Recommend continued NPO with FEES to further evaluate. Patient may have 4-5 ice chips per hr with supervision after oral care at MD discretion.  -           Pharyngeal Phase Concerns    Pharyngeal Phase Concerns wet vocal quality;multiple swallows  -        Wet Vocal Quality thin  -        Multiple Swallows thin;nectar;pudding  -           SLP Evaluation Clinical Impression    SLP Swallowing Diagnosis functional oral phase;suspected pharyngeal dysphagia  -        Functional Impact risk of aspiration/pneumonia;risk of malnutrition;risk of dehydration  -        Rehab Potential/Prognosis, Swallowing good, to achieve stated therapy goals  -        Swallow Criteria for Skilled Therapeutic Interventions Met demonstrates skilled criteria  -           Recommendations    Predicted Duration Therapy Intervention (Days) until discharge  -        SLP Diet Recommendation NPO;other (see comments)  4-5 ice chips after oral care with supervision  -        Recommended Diagnostics FEES  -         Recommended Precautions and Strategies upright posture during/after eating;general aspiration precautions  -        Oral Care Recommendations Oral Care BID/PRN  -        SLP Rec. for Method of Medication Administration meds via alternate route  -        Monitor for Signs of Aspiration notify SLP if any concerns  -        Anticipated Discharge Disposition (SLP) unknown;anticipate therapy at next level of care  -              User Key  (r) = Recorded By, (t) = Taken By, (c) = Cosigned By    Initials Name Effective Dates    Jenny Rivera MS CCC-SLP 06/16/21 -                 EDUCATION  The patient has been educated in the following areas:   Dysphagia (Swallowing Impairment) Oral Care/Hydration Modified Diet Instruction.              Time Calculation:    Time Calculation- SLP     Row Name 03/23/23 1501             Time Calculation- SLP    SLP Start Time 1255  -      SLP Received On 03/23/23  -         Untimed Charges    SLP Eval/Re-eval  ST Eval Oral Pharyng Swallow - 16324  -CH      [id number] Eval Oral Pharyng Swallow Minutes 53  -CH         Total Minutes    Untimed Charges Total Minutes 53  -CH       Total Minutes 53  -CH            User Key  (r) = Recorded By, (t) = Taken By, (c) = Cosigned By    Initials Name Provider Type    Jenny Rivera MS CCC-SLP Speech and Language Pathologist                Therapy Charges for Today     Code Description Service Date Service Provider Modifiers Qty    [id number] HC ST EVAL ORAL PHARYNG SWALLOW 4 3/23/2023 Jenny Elizondo MS CCC-SLP GN 1               Jenny Elizondo MS CCC-SLP  3/23/2023

## 2023-03-23 NOTE — PROGRESS NOTES
INTENSIVIST / PULMONARY FOLLOW UP NOTE     Hospital:  LOS: 1 day   Ms. Sharmila Delarosa, 72 y.o. female is followed for:     AoC respiratory failure with hypoxia and hypercapnia    Stage 4 very severe COPD by GOLD classification    Bronchiectasis    Pulmonary nodule    RA (rheumatoid arthritis)    On chronic immunosuppression therapy    JORGITO    GERD    UTI (urinary tract infection)    Acute respiratory failure with hypoxia and hypercarbia (HCC)          SUBJECTIVE   Drowsy, but conversant. Denies pain, N/V, SOA. Currently on high-flow nasal cannula with saturations in the high 90s.     The patient's relevant past medical, surgical, family, and social history were reviewed    Allergies and medications were reviewed    ROS:    Per subjective, all other systems were reviewed and were negative        OBJECTIVE     Vital Sign Min/Max for last 24 hours:  Temp  Min: 97.1 °F (36.2 °C)  Max: 98.1 °F (36.7 °C)   BP  Min: 80/53  Max: 133/67   Pulse  Min: 64  Max: 133   Resp  Min: 16  Max: 28   SpO2  Min: 92 %  Max: 100 %   No data recorded     Physical Exam:  General Appearance:  No acute distress  Eyes:  No scleral icterus or pallor, pupils normal  Ears, Nose, Mouth, Throat:  Atraumatic, oropharynx clear  Neck:  Trachea midline, thyroid normal  Respiratory:  Coarse bilaterally.  Cardiovascular:  Regular rate and rhythm, no murmurs, no peripheral edema  Gastrointestinal:  Soft, non-tender, non-distended, no hepatosplenomegaly  Skin:  Normal temperature, no rash  Psychiatric:  No agitation   Neuro:  No new focal neurologic deficits observed    Intake/Ouptut 24 hrs (7:00AM - 6:59 AM)  Intake & Output (last 3 days)       03/20 0701  03/21 0700 03/21 0701  03/22 0700 03/22 0701  03/23 0700 03/23 0701  03/24 0700    I.V. (mL/kg)  100 (1.7) 882 (14.8) 658.3 (11.1)    IV Piggyback  3650 175 214.7    Total Intake(mL/kg)  3750 (63.6) 1057 (17.7) 873 (14.7)    Urine (mL/kg/hr)  100 575 (0.4)     Total Output  100 575     Net  +3650  +482 +873            Urine Unmeasured Occurrence   2 x 1 x    Stool Unmeasured Occurrence    1 x          Lines, Drains & Airways     Active LDAs     Name Placement date Placement time Site Days    Peripheral IV 03/21/23 2129 Right Antecubital 03/21/23 2129  Antecubital  less than 1                Hematology:  Results from last 7 days   Lab Units 03/23/23 0229 03/22/23  1751 03/22/23 0616 03/21/23 2129   WBC 10*3/mm3 14.97* 20.32* 22.26* 27.74*   HEMOGLOBIN g/dL 6.3* 7.0* 7.0* 8.6*   HEMATOCRIT % 23.8* 26.0* 25.7* 32.1*   PLATELETS 10*3/mm3 179 191 192 251     Electrolytes, Magnesium and Phosphorus:  Results from last 7 days   Lab Units 03/23/23 0229 03/22/23 0616 03/21/23 2129   SODIUM mmol/L 146* 141 145   CHLORIDE mmol/L 99 95* 88*   POTASSIUM mmol/L 4.2 4.7 3.1*   CO2 mmol/L 44.0* 42.0* >50.0*   MAGNESIUM mg/dL 2.1 3.2* 0.8*   PHOSPHORUS mg/dL 3.6 3.3  --      Renal:  Results from last 7 days   Lab Units 03/23/23 0229 03/22/23 0616 03/21/23 2129   CREATININE mg/dL 1.10* 1.26* 1.23*   BUN mg/dL 35* 24* 24*     Estimated Creatinine Clearance: 44.6 mL/min (A) (by C-G formula based on SCr of 1.1 mg/dL (H)).  Hepatic:  Results from last 7 days   Lab Units 03/23/23 0229 03/21/23 2129   ALK PHOS U/L 57 80   BILIRUBIN mg/dL 0.3 0.7   ALT (SGPT) U/L 16 9   AST (SGOT) U/L 29 17     Arterial Blood Gases:  Results from last 7 days   Lab Units 03/23/23  0431 03/22/23  1654 03/22/23  0541 03/21/23  2152   PH, ARTERIAL pH units 7.314* 7.301* 7.316* 7.376   PCO2, ARTERIAL mm Hg 87.2* 88.8* 84.0* 93.2*   PO2 ART mm Hg 61.1* 106.0 77.9* 173.0*   FIO2 % 40 45 45 80             Lab Results   Component Value Date    LACTATE 1.6 03/22/2023       Relevant imaging studies and labs from 03/23/23 were reviewed    Medications (drips):  Pharmacy to dose vancomycin  phenylephrine, Last Rate: Stopped (03/23/23 0231)  sodium chloride, Last Rate: 75 mL/hr (03/22/23 1452)        budesonide, 0.5 mg, Nebulization, BID - RT  heparin  (porcine), 5,000 Units, Subcutaneous, Q8H  ipratropium-albuterol, 3 mL, Nebulization, Once  ipratropium-albuterol, 3 mL, Nebulization, Q4H - RT  methylPREDNISolone sodium succinate, 40 mg, Intravenous, Q8H  pantoprazole, 40 mg, Intravenous, Q AM  piperacillin-tazobactam, 3.375 g, Intravenous, Q8H  sodium chloride, 10 mL, Intravenous, Q12H        •  Magnesium Standard Dose Replacement - Follow Nurse / BPA Driven Protocol  •  Pharmacy to dose vancomycin  •  Potassium Replacement - Follow Nurse / BPA Driven Protocol  •  sodium chloride  •  sodium chloride  •  sodium chloride  •  sodium chloride  •  sodium chloride    Assessment & Plan   IMPRESSION / PLAN     Inpatient Problem List:  72 y.o.female:  Active Hospital Problems    Diagnosis    • **AoC respiratory failure with hypoxia and hypercapnia    • Acute respiratory failure with hypoxia and hypercarbia (HCC)    • RA (rheumatoid arthritis)    • On chronic immunosuppression therapy    • JORGITO    • GERD    • UTI (urinary tract infection)    • Stage 4 very severe COPD by GOLD classification    • Pulmonary nodule    • Bronchiectasis         Hospital Course:  72 y.o.female with relevant PMH of former smoker, COPD / chronic respiratory failure on home O2 (2L), bronchiectasis, LLL pulmonary nodule, RA, chronic immunosuppression therapy on prednisone, MTX, & Plaquenil, right breast CA s/p lumpectomy with adjuvant chemo & XRT (2015), and GERD admitted 3/21/2023 after being found poorly responsive with sat of 70% on RA.  Found to have severe mostly chronic hypercapnic respiratory failure and acute on chronic hypoxemic respiratory failure.    Felt to be septic as well from PNA vs UTI.    Developed Afib, reportedly new.    Impression:  BP stable. On high-flow nasal cannula saturating well. BiPAP PRN    Plan:  -continue scheduled nebs, pulmicort  -IV continue IV vanco and pip-tazo  -gentle IVF  -Poli as needed to keep MAP > 65  -New-onset Afib; rate controlled  -follow up  cultures  -PICC insertion for access  -give breaks off of bipap as able; currently on high-flow nasal cannula  -Hemoglobin 6.3 this morning without obvious signs of bleeding. 2u PRBCs ordered  -DVT / PUD prophylaxis  -Palliative care consulted; appreciate assitance      Nutrition  Dietary Orders (From admission, onward)     Start     Ordered    03/21/23 2340  NPO Diet NPO Type: Strict NPO  Diet Effective Now        Comments: Should Remain in Effect Until a Complete SLP Evaluation Occurs & a Superceding Order is Received   Question:  NPO Type  Answer:  Strict NPO    03/21/23 8378                  Plan of care and goals reviewed with multidisciplinary team at daily rounds    Time spent: 35 minutes     Rochelle Lyn, MSN, APRN, ACNPC-AG  Pulmonary and Critical Care Medicine  Electronically signed by DINO Bran, 03/23/23, 1:54 PM EDT.

## 2023-03-23 NOTE — PROGRESS NOTES
Pharmacy Consult-Vancomycin Dosing  Sharmila Delarosa is a  72 y.o. female receiving vancomycin therapy.     Indication:PNA  Consulting Provider: intensivist  ID Consult:     Goal Trough: 15-20    Current Antimicrobial Therapy  Anti-Infectives (From admission, onward)      Ordered     Dose/Rate Route Frequency Start Stop    03/22/23 0531  vancomycin (dosing per levels)        Ordering Provider: Bala Suero, PharmD     Does not apply Daily 03/22/23 0900 03/29/23 0859    03/21/23 2305  piperacillin-tazobactam (ZOSYN) 3.375 g in iso-osmotic dextrose 50 ml (premix)        Ordering Provider: Irene Gomez DNP, APRN    3.375 g  over 4 Hours Intravenous Every 8 Hours 03/22/23 0600 03/29/23 0559    03/21/23 2321  vancomycin 1250 mg/250 mL 0.9% NS IVPB ([facility])        Ordering Provider: Bala Suero, PharmD    20 mg/kg × 59 kg  over 90 Minutes Intravenous Once 03/21/23 2323 03/22/23 0207    03/21/23 2305  Pharmacy to dose vancomycin        Ordering Provider: Irene Gomez DNP, APRN     Does not apply Continuous PRN 03/21/23 2305 03/28/23 2304    03/21/23 2158  AZITHROMYCIN 500 MG/250 ML 0.9% NS IVPB (vial-mate)        Ordering Provider: Júnior Flores MD    500 mg  over 60 Minutes Intravenous Once 03/21/23 2300 03/22/23 0033    03/21/23 2158  piperacillin-tazobactam (ZOSYN) 3.375 g in iso-osmotic dextrose 50 ml (premix)        Ordering Provider: Júnior Flores MD    3.375 g  over 30 Minutes Intravenous Once 03/21/23 2200 03/21/23 2334            Allergies  Allergies as of 03/21/2023 - Reviewed 03/21/2023   Allergen Reaction Noted    Cephalosporins Unknown - Low Severity 09/08/2022       Labs    Results from last 7 days   Lab Units 03/23/23  0229 03/22/23  0616 03/21/23  2129   BUN mg/dL 35* 24* 24*   CREATININE mg/dL 1.10* 1.26* 1.23*       Results from last 7 days   Lab Units 03/23/23  0229 03/22/23  1751 03/22/23  0616   WBC 10*3/mm3 14.97* 20.32* 22.26*       Evaluation of Dosing     Last Dose  "Received in the ED/Outside Facility: 0  Is Patient on Dialysis or Renal Replacement: no    Ht - 167.6 cm (66\")  Wt - 59.7 kg (131 lb 9.8 oz)    Estimated Creatinine Clearance: 44.9 mL/min (A) (by C-G formula based on SCr of 1.1 mg/dL (H)).    Intake & Output (last 3 days)         03/20 0701 03/21 0700 03/21 0701 03/22 0700 03/22 0701 03/23 0700 03/23 0701 03/24 0700    I.V. (mL/kg)  100 (1.7) 882 (14.8)     IV Piggyback  3650 175     Total Intake(mL/kg)  3750 (63.6) 1057 (17.7)     Urine (mL/kg/hr)  100 575 (0.4)     Total Output  100 575     Net  +3650 +482             Urine Unmeasured Occurrence   2 x             Microbiology and Radiology  Microbiology Results (last 10 days)       Procedure Component Value - Date/Time    MRSA Screen, PCR (Inpatient) - Swab, Nares [[id number]]  (Normal) Collected: 03/22/23 0400    Lab Status: Final result Specimen: Swab from Nares Updated: 03/22/23 1114     MRSA PCR Negative    Narrative:      The negative predictive value of this diagnostic test is high and should only be used to consider de-escalating anti-MRSA therapy. A positive result may indicate colonization with MRSA and must be correlated clinically.  MRSA Negative    Legionella Antigen, Urine - Urine, Urine, Catheter In/Out [[id number]]  (Normal) Collected: 03/21/23 2228    Lab Status: Final result Specimen: Urine, Catheter In/Out Updated: 03/22/23 0943     LEGIONELLA ANTIGEN, URINE Negative    S. Pneumo Ag Urine or CSF - Urine, Urine, Catheter In/Out [[id number]]  (Normal) Collected: 03/21/23 2228    Lab Status: Final result Specimen: Urine, Catheter In/Out Updated: 03/22/23 0943     Strep Pneumo Ag Negative    COVID PRE-OP / PRE-PROCEDURE SCREENING ORDER (NO ISOLATION) - Swab, Nasopharynx [[id number]]  (Normal) Collected: 03/21/23 2152    Lab Status: Final result Specimen: Swab from Nasopharynx Updated: 03/21/23 2256    Narrative:      The following orders were created for panel order COVID PRE-OP / PRE-PROCEDURE " SCREENING ORDER (NO ISOLATION) - Swab, Nasopharynx.  Procedure                               Abnormality         Status                     ---------                               -----------         ------                     Respiratory Panel PCR w/...[[id number]]  Normal              Final result                 Please view results for these tests on the individual orders.    Respiratory Panel PCR w/COVID-19(SARS-CoV-2) DANETTE/RENETTA/NIKKI/PAD/COR/MAD/LAVONNE In-House, NP Swab in UTM/VTM, 3-4 HR TAT - Swab, Nasopharynx [[id number]]  (Normal) Collected: 03/21/23 2152    Lab Status: Final result Specimen: Swab from Nasopharynx Updated: 03/21/23 2256     ADENOVIRUS, PCR Not Detected     Coronavirus 229E Not Detected     Coronavirus HKU1 Not Detected     Coronavirus NL63 Not Detected     Coronavirus OC43 Not Detected     COVID19 Not Detected     Human Metapneumovirus Not Detected     Human Rhinovirus/Enterovirus Not Detected     Influenza A PCR Not Detected     Influenza B PCR Not Detected     Parainfluenza Virus 1 Not Detected     Parainfluenza Virus 2 Not Detected     Parainfluenza Virus 3 Not Detected     Parainfluenza Virus 4 Not Detected     RSV, PCR Not Detected     Bordetella pertussis pcr Not Detected     Bordetella parapertussis PCR Not Detected     Chlamydophila pneumoniae PCR Not Detected     Mycoplasma pneumo by PCR Not Detected    Narrative:      In the setting of a positive respiratory panel with a viral infection PLUS a negative procalcitonin without other underlying concern for bacterial infection, consider observing off antibiotics or discontinuation of antibiotics and continue supportive care. If the respiratory panel is positive for atypical bacterial infection (Bordetella pertussis, Chlamydophila pneumoniae, or Mycoplasma pneumoniae), consider antibiotic de-escalation to target atypical bacterial infection.    Blood Culture - Blood, Arm, Right [[id number]]  (Normal) Collected: 03/21/23 2140    Lab Status:  Preliminary result Specimen: Blood from Arm, Right Updated: 03/22/23 2200     Blood Culture No growth at 24 hours    Blood Culture - Blood, Hand, Left [[id number]]  (Normal) Collected: 03/21/23 2135    Lab Status: Preliminary result Specimen: Blood from Hand, Left Updated: 03/22/23 2200     Blood Culture No growth at 24 hours            Vancomycin Levels:    Results from last 7 days   Lab Units 03/23/23  0229   VANCOMYCIN RM mcg/mL 9.40                     Assessment/Plan:      Pharmacy to dose vancomycin for pneumonia. Goal trough 15-20 due to infection severity.  Patient received loading dose of 1250mg on 3/22 at 0037. Dosing per levels due to poor renal function.  3/23 vancomycin random is 9.4mcg/mL.   Will give additional dose of 1000mg (16.2mg/kg) today and assess random level on 3/24 with AM labs.   Patient is afebrile and appropriate UOP, SCr and WBC are elevated but improving.  Pharmacy will continue to monitor cultures, sensitivities, renal function, and clinical status, and will adjust regimen as necessary.      Thank you for this consult,  Almas Rodriges, PharmD  Pharmacy Resident  3/23/2023  07:33 EDT

## 2023-03-23 NOTE — CONSULTS
COPD Nurse Navigator has received consult and completed chart review.  Patient on bipap and having test performed at BS at time of rounding.  Palliative consult in place.  BS visit deferred this date.  NN will continue to follow and see patient for interview and education as appropriate throughout admission.

## 2023-03-23 NOTE — CONSULTS
5FR PICC PLACED BY MECHELLE XAVIER RN [facility], TIP VERIFIED BY 3CG, SEE LDA. NO COMPLICATIONS NOTED.

## 2023-03-23 NOTE — PLAN OF CARE
Goal Outcome Evaluation:  Plan of Care Reviewed With: patient           Outcome Evaluation: vss. remains drowsy and confused.  tolerated hfnc most of day.  40L/45% currently, SA/SR/ST on monitor, short runs of afib occasionally, fees planned for tomorrow, no ativan given this shift, picc placed, currently infusing 2nd unit of 2 PRBC.

## 2023-03-23 NOTE — PLAN OF CARE
Goal Outcome Evaluation:      Patient had some anxiety overnight and required one dose of ativan to keep monitoring and BiPaP on and IV intact.  BP stable. Off lakshmi at 0230. Adequate UOP.

## 2023-03-23 NOTE — PLAN OF CARE
"Goal Outcome Evaluation:  Plan of Care Reviewed With: patient, son (CONCHIS Roper spoke with son by phone)        Progress: no change  Outcome Evaluation: Palliative consult for GOC/ACP; pt has ACP doc on file, son Christian Delarosa designated primary HCS, dtr Sharon Loyola secondary. Pt requiring frequent HFnc and BiPap to maintain Sats. Pt denied pain when asked, responded, \"Not too bad\" when asked about dyspnea; observable increase is WOB with talking. Per phone conversation with son Christian, remains full code; plans to visit this evening, wishes to know result of SLP eval. Palliative following for continued support to pt and family in ongoing GOC discussion.    1300 Palliative IDT meeting: RN, APRN, SW    After hours, weekends and holidays, contact Palliative Provider by calling [phone]    Problem: Palliative Care  Goal: Enhanced Quality of Life  Outcome: Ongoing, Progressing  Intervention: Promote Advance Care Planning  Flowsheets (Taken 3/23/2023 1431)  Life Transition/Adjustment:   palliative care discussed   palliative care initiated   decision-making facilitated  Intervention: Maximize Comfort  Flowsheets (Taken 3/23/2023 1433)  Pain Management Interventions: (pt denied pain when asked) other (see comments)  Intervention: Optimize Function  Flowsheets (Taken 3/23/2023 1433)  Sensory Stimulation Regulation:   quiet environment promoted   television on  Fatigue Management: frequent rest breaks encouraged  Intervention: Optimize Psychosocial Wellbeing  Flowsheets (Taken 3/23/2023 1433)  Supportive Measures:   active listening utilized   goal-setting facilitated   positive reinforcement provided   verbalization of feelings encouraged  Spiritual Activities Assistance: (Spiritual Care consult) other (see comments)  Family/Support System Care:   caregiver stress acknowledged   involvement promoted   presence promoted   self-care encouraged   support provided     "

## 2023-03-23 NOTE — CONSULTS
Palliative Care Initial Consult   Attending Physician: Justice Kraft MD  Referring Provider: Dr. Carl Verma    Reason for Referral:  assistance with clarification of goals of care    Code Status:   There are no questions and answers to display.      Advanced Directives: Advance Directive Status: Patient has advance directive, copy in chart   Family/Support: Christian Delarosa (son)  Goals of Care: TBD.    HPI: Sharmila Delarosa is a 72 y.o. female with PMH significant for COPD on 2LNC ATC, bronchiectasis, LLL pulmonary nodule, RA, chronic immunosuppression therapy on prednisone, right breast cancer s/p lumpectomy, chemo, XRT 2015, GERD. Patient presented to [facility] on 3/21 from home via EMS after being found poorly responsive by patient's son. Work up revealed sepsis secondary to UTI versus PNA with respiratory failure and hypotension. Urinalysis concern for UTI, IV ABX started. Patient currently on BiPAP, required vasopressor but now off. Hgb/Hct low, to receive PRBC's. Palliative Care consulted for GOC in the context of complex medical decision making.   No family at bedside at time of visit. Patient opens eyes to command, answers some questions appropriately but does not appear fully oriented. Falls asleep easily. Denies pain, asks for water. Awaiting speech evaluation prior to diet order.     ROS: +thirsty, asks for water. Denies pain. Unable to complete full ROS secondary to AMS.        Past Medical History:   Diagnosis Date   • Anxiety    • Breast cancer (HCC) 2015    Right breast    • Depression    • Drug therapy    • GERD 3/21/2023   • Hx of radiation therapy    • On chronic immunosuppression therapy 3/21/2023   • RA (rheumatoid arthritis) 3/21/2023     Past Surgical History:   Procedure Laterality Date   • BREAST BIOPSY Right 2015    X 3 BX's   • BREAST LUMPECTOMY       Social History     Socioeconomic History   • Marital status:    Tobacco Use   • Smoking status: Former     Packs/day: 1.00      "Years: 40.00     Pack years: 40.00     Types: Cigarettes     Quit date:      Years since quittin.2   • Smokeless tobacco: Never   Vaping Use   • Vaping Use: Never used   Substance and Sexual Activity   • Alcohol use: Never   • Drug use: Never   • Sexual activity: Defer     Family History   Problem Relation Age of Onset   • Aortic stenosis Mother    • Melanoma Father    • Ovarian cancer Sister    • Breast cancer Sister    • Rectal cancer Sister    • Breast cancer Maternal Grandmother    • Breast cancer Daughter        Allergies   Allergen Reactions   • Cephalosporins Unknown - Low Severity       Current medication reviewed for route, type, dose and frequency and are current per MAR at time of dictation.    Palliative Performance Scale Score:  40%    /92   Pulse 96   Temp 98 °F (36.7 °C) (Axillary)   Resp 16   Ht 167.6 cm (66\")   Wt 59.7 kg (131 lb 9.8 oz)   SpO2 100%   BMI 21.24 kg/m²     Intake/Output Summary (Last 24 hours) at 3/23/2023 0846  Last data filed at 3/23/2023 0524  Gross per 24 hour   Intake 1057 ml   Output 575 ml   Net 482 ml       Physical Exam:    General Appearance:    Patient laying in bed, A/C ill appearing, frail, follows commands, opens eyes on command, NAD   HEENT:    NC/AT, EOMI, anicteric, MM dry, BiPAP mask in place   Neck:   supple, trachea midline, no JVD   Lungs:      diminished in bases; WOB increased with speaking and at rest; RR 22-24 on exam, BiPAP 50% FiO2    Heart:    RRR, normal S1 and S2, no M/R/G, HR 95 on monitor   Abdomen:     Normal bowel sounds, soft, nontender, nondistended   G/U:   Deferred   MSK/Extremities:   Wasting, no edema   Pulses:   Pulses palpable and equal bilaterally   Skin:   Warm, dry   Neurologic:   Opens eyes to verbal stimuli, answers some questions,   Psych:   Calm, appropriate         Labs:   Results from last 7 days   Lab Units 23  0229   WBC 10*3/mm3 14.97*   HEMOGLOBIN g/dL 6.3*   HEMATOCRIT % 23.8*   PLATELETS 10*3/mm3 179 "     Results from last 7 days   Lab Units 03/23/23  0229   SODIUM mmol/L 146*   POTASSIUM mmol/L 4.2   CHLORIDE mmol/L 99   CO2 mmol/L 44.0*   BUN mg/dL 35*   CREATININE mg/dL 1.10*   GLUCOSE mg/dL 112*   CALCIUM mg/dL 8.4*     Results from last 7 days   Lab Units 03/23/23 0229   SODIUM mmol/L 146*   POTASSIUM mmol/L 4.2   CHLORIDE mmol/L 99   CO2 mmol/L 44.0*   BUN mg/dL 35*   CREATININE mg/dL 1.10*   CALCIUM mg/dL 8.4*   BILIRUBIN mg/dL 0.3   ALK PHOS U/L 57   ALT (SGPT) U/L 16   AST (SGOT) U/L 29   GLUCOSE mg/dL 112*     Imaging Results (Last 72 Hours)     Procedure Component Value Units Date/Time    XR Chest 1 View [[id number]] Resulted: 03/23/23 0436     Updated: 03/23/23 0437    CT Angiogram Chest Pulmonary Embolism [[id number]] Collected: 03/21/23 2228     Updated: 03/22/23 0040    Narrative:      Exam: CTA thorax, PE protocol    Date: March 21, 2023    History: Shortness of breath    Comparison: None available    Technique: Contiguous axial CT images obtained of the thorax following the uneventful intravenous administration of 80 mL Isovue-370 contrast. Additionally, sagittal, coronal and 3-D reformatted images were obtained. CT dose lowering techniques were   used, to include: automated exposure control, adjustment for patient size, and or use of iterative reconstruction.    Findings:    Thoracic aorta: There are moderate atherosclerotic changes. There is no aneurysm or dissection.    HEART: The heart size is prominent. The right heart is asymmetrically enlarged.    Pulmonary arteries: The pulmonary arteries are well visualized. There is no filling defect to suggest an acute pulmonary embolus.    Mediastinum: There is a calcified mediastinal lymph nodes. There is an enlarged right hilar lymph node measuring 1.9 cm in short axis. There are additional borderline and mildly prominent right hilar lymph nodes.    Lung parenchyma: There is a background of emphysema. There is alveolar and groundglass airspace  disease in the right upper lobe consistent with pneumonia. There is a more lobular pleural-based mass in the inferior aspect of the right upper lobe measuring   4 x 3.2 cm on series 5 image 49. There is bronchial wall thickening with mild bronchiectasis in the right lower lobe. There are subtle areas of mucous plugging involving the bronchioles to the right lower lobe. There is a very small right-sided pleural   effusion, loculated along the lateral aspect of the right lower lobe. There is miliary nodularity identified in both lungs. There are additional slightly larger pulmonary nodules in the left lower lobe, one of which measures 8 mm on series 5 image 61.   There is no pneumothorax.    Upper abdomen: The gallbladder is surgically absent. There is a nonobstructing 6 mm right renal calculus.    Osseous structures: There are advanced degenerative changes involving both shoulders. There are moderate degenerative changes in the spine. No acute fractures are identified.      Impression:      1. Right upper lobe pneumonia. There is also a more lobular pleural-based mass in the inferior aspect of the right upper lobe measuring 4 x 3.2 cm. This could represent consolidative pneumonia or soft tissue neoplasm. Continuous radiographic follow-up to   complete resolution is mandatory. A short-term follow-up CT thorax after treatment is recommended.   2. Emphysema.  3. Bronchial wall thickening likely representing bronchiolitis. There is bronchiectasis in the right lower lobe. There are areas of mucous plugging involving the bronchioles to the right lower lobe. Differential considerations would include infectious   bronchiolitis or an aspiration event.  4. Miliary type nodularity throughout both lungs most likely representing infectious bronchiolitis. Atypical infectious etiologies could also be considered, including fungal infections.  5. There are slightly larger pulmonary nodules in the left lower lobe, one of which  measures 8 mm. These are favored to be infectious or inflammatory. Follow-up is recommended per Fleischner guidelines below.  6. Right hilar adenopathy. These lymph nodes could be reactive. Additional consideration would include metastatic disease. A follow-up PET/CT study could be considered for better characterization as determined clinically.  7. Cardiomegaly with asymmetric enlargement of the right heart.  8. Nonobstructing 6 mm right renal calculus.  9. Very small loculated right-sided pleural effusion. This could represent a small empyema.  10. No visualized pulmonary embolus.    *FLEISCHNER SOCIETY GUIDELINES:  Low risk patient:  <6mm - Low Risk, no further f/u  >6-8mm - low dose CT 6-12 mo, then 2nd f/u CT at 18-24mo  >8mm - low dose CT 3,9,24mo OR PET/CT OR Biopsy    High Risk Patient:  <6mm - optional CT at 12 mo  >6-8mm - low dose CT 6-12 mo, then 2nd f/u CT at 18-24mo  >8mm - low dose CT at 3 mo, OR PET/CT OR Biopsy    MULTIPLE NODULES:  Low Risk Patient:  <6mm - Low Risk, no further f/u  >6-8mm - low dose CT 3-6 mo, then 2nd f/u CT at 18-24mo  >8mm - low dose CT 3-6 mo, then 2nd f/u CT at 18-24mo    High Risk Patient:  <6mm - High risk, multiple nodules, optional CT at 12 mo  >6-8mm - low dose CT 3-6 mo, then 2nd f/u CT at 18-24mo  >8mm - low dose CT 3-6 mo, then 2nd f/u CT at 18-24mo      Slot 63    Electronically signed by:  Chandan Yañez M.D.    3/21/2023 10:39 PM Mountain Time    CT Head Without Contrast [[id number]] Collected: 03/21/23 2229     Updated: 03/22/23 0032    Narrative:      EXAMINATION: CT HEAD WO CONTRAST    DATE: 3/21/2023 10:46 PM     INDICATION: Altered mental status     COMPARISON: None available.     TECHNIQUE: Thin section noncontrast axial images were obtained through the head. Coronal reformats were created.  CT dose lowering techniques were used, to include: automated exposure control, adjustment for patient size, and or use of iterative   reconstruction.     FINDINGS:      Intracranial contents:    No acute intracranial hemorrhage, evidence of acute territorial infarct, mass, mass effect or hydrocephalus. Mild global cerebral volume loss. Mild chronic small vessel ischemic changes in the white matter and mild intracranial atherosclerosis.     Bones and extracranial soft tissues:     No fracture or focal osseous lesion in the calvarium or skull base. Paranasal sinuses are clear where visualized. Mastoid air cells and middle ear cavities are clear bilaterally. Bilateral cataract surgery. No acute findings in the orbits. 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         Impression:         No acute intracranial abnormality visible by CT.          This examination was interpreted by Sanjay Cast M.D.     Electronically signed by:  Sanjay Cast M.D.    3/21/2023 10:31 PM Mountain Time    XR Chest 1 View [[id number]] Collected: 03/21/23 2314     Updated: 03/21/23 2318    Narrative:      XR CHEST 1 VW    Date of Exam: 3/21/2023 9:40 PM EDT    Indication: SOA triage protocol.    Comparison: None available.    Findings:  There are patchy parenchymal opacities in the right upper and mid lung compatible with pneumonia. No pleural effusion. No pneumothorax. Normal cardiomediastinal silhouette.      Impression:      Impression:  Right lung pneumonia.    Electronically Signed: Derek Callahan    3/21/2023 11:15 PM EDT    Workstation ID: [id number]                Diagnostics: Reviewed    A:   AoC respiratory failure with hypoxia and hypercapnia    Stage 4 very severe COPD by GOLD classification    Bronchiectasis    Pulmonary nodule    RA (rheumatoid arthritis)    On chronic immunosuppression therapy    JORGITO    GERD    UTI (urinary tract infection)    Acute respiratory failure with hypoxia and hypercarbia (HCC)     72 y.o. female with COPD, bronchiectasis, JORGITO, GERD, PNA, UTI.     S/S:   1. Dyspnea -currently on BiPAP/HFNC  -continue nebs, steroids, ABX    2. Dysphagia  -awaiting speech evaluation  -discussed artificial nutrition versus comfort diet with son if patient fails speech evaluation    3. Debility -recommend PT/OT evaluation if respiratory status allows    4. GOC -Full Code/Full Support -per discussion with Christian leung, HCS  -reviewed LW which gives Christian, madhu, and [facility] authority to make medical decisions based on his best judgement    P: Introduced Palliative Care and services. Patient did not engage in conversation and would fall asleep easily during visit. No family at bedside.  Called Christian leung, at [phone] at 2:11pm. Introduced Palliative Care and services. Discussed patient's condition and code status in light of patient's chronic conditions. Discussed that patient currently on BiPAP/HFNC and next step to maintain respiratory status if patient were to decline would be MV. In light of patient's COPD, she would most likely not wean off MV and may remain on it long term. Son would like patient to remain Full Code/Full Support for now. Discussed awaiting speech evaluation to determine if patient will need alternate nutrition due to dysphagia. Madhu would like speech results as soon as possible to determine upcoming decisions. Emotional support provided throughout conversation. Discussed ongoing interventions and work up. All questions and concerns addressed.   Thank you for this consult and allowing us to participate in patient's plan of care. Palliative Care Team will continue to follow patient. Please do not hesitate to contact us regarding further symptom management or goals of care needs.  Time: 60 minutes spent reviewing medical and medication records, assessing and examining patient, discussing with family, answering questions, providing some guidance about a plan and documentation of care, and coordinating care with other healthcare members, with > 50% time spent face to face.         Makayla Roper, APRN  3/23/2023

## 2023-03-23 NOTE — SIGNIFICANT NOTE
03/23/23 0845   SLP Deferred Reason   SLP Deferred Reason Unable to evaluate, medical status change  (Unable to complete dysphagia evaluation this date as patient remains on BiPap. Will reattempt as appropriate.)

## 2023-03-24 PROBLEM — D64.9 ANEMIA: Chronic | Status: ACTIVE | Noted: 2023-01-01

## 2023-03-24 PROBLEM — D53.9 MACROCYTIC ANEMIA: Chronic | Status: ACTIVE | Noted: 2023-01-01

## 2023-03-24 NOTE — CONSULTS
"  Referring Provider: DINO Chacon  Reason for Consultation: AoCRF    Subjective .   Education:  NN spoke with pt at BS.  Pt alert and able to answer questions appropriately.  Pt O2 sat    100% on  54% currently, home O2 use  2L.  Pt reports the ability to ambulate \"not very long\"  at baseline before experiencing SOB.  Pt states use of rescue inhaler \"not often\", relief of SOB within ~2 mins.  Patient is up to date on COVID and PNA vaccines.  She is not clear whether she is UTD on flu vaccines.  Former smoker, quit date 2018.   Pt reports authorization issues at this time with medications and does have issue with transportation for appointments.  Pt reports no previous hx of formal COPD teaching, no understanding of action plan, or HI.  Stop light report, NN contact information, instructions for accessing [facility] and list of educational videos given to pt.  \"A Patient's Guide to COPD\" booklet left at BS. [id number] reference sheet discussed and given to patient at BS.   COPD education completed in the form of explanation, handouts, and videos.  No new concerns or questions voiced at this time.  NN will continue to follow as needed.     Age: 72 y.o.  Sex: female  Smoker Status: former, ~40 pack years  Pulmonologist: Case, DO  FEV1 (PFT): 23% (9/15/2022)  Home O2: 2L    Objective     SpO2 SpO2: 94 % (03/24/23 1100)  Device Device (Oxygen Therapy): high-flow nasal cannula (03/24/23 1000)  Flow Flow (L/min): 45 (03/24/23 1000)  Incentive Spirometer    IS Predicted Level (mL)     Number of Repetitions     Level Incentive Spirometer (mL)    Patient Tolerance     Inhaler Treatment Status    Treatment Route        Home Medications:  Medications Prior to Admission   Medication Sig Dispense Refill Last Dose   • alendronate (FOSAMAX) 70 MG tablet Take 1 tablet by mouth Every 7 (Seven) Days.   3/21/2023   • Allegra-D Allergy & Congestion  MG per 12 hr tablet Take 1 tablet by mouth Daily As Needed. OTC   3/21/2023   • " esomeprazole (nexIUM) 40 MG capsule Take 1 capsule by mouth Daily. OTC   3/21/2023   • hydroxychloroquine (PLAQUENIL) 200 MG tablet Take 1 tablet by mouth Daily.   3/21/2023   • methotrexate 2.5 MG tablet Take 8 tablets by mouth 1 (One) Time Per Week. Take 8 tablet by mouth once a week   Past Week   • metoprolol succinate XL (TOPROL-XL) 50 MG 24 hr tablet Take 1 tablet by mouth Daily.   3/21/2023   • multivitamin with minerals tablet tablet Take 1 tablet by mouth Daily. OTC   3/21/2023   • oxybutynin (DITROPAN) 5 MG tablet Take 1 tablet by mouth 2 (Two) Times a Day.   3/21/2023   • predniSONE (DELTASONE) 10 MG tablet Take 3 tablets by mouth Daily.   3/21/2023   • sertraline (ZOLOFT) 100 MG tablet Take 1 tablet by mouth Daily.   3/21/2023   • tamoxifen (NOLVADEX) 20 MG chemo tablet Take 1 tablet by mouth Every Other Day.   3/21/2023   • tiotropium bromide monohydrate (Spiriva Respimat) 2.5 MCG/ACT aerosol solution inhaler Inhale 2 puffs Daily.   3/21/2023       Discussion: Per current GOLD Standards, please consider:  LAMA in place (Spiriva Respimat), No LABA/ICS in place, Outpatient PFT, Rehab as appropriate, Palliative Care consult,  Annual LDCT per current screening guidelines (age 50-80 years old, smoking history of 20 pack years or more or has quit within past 15 years)     Patient has been scheduled for a hospital follow up with [facility] for 4/19/2023 @ 2:30 pm with DINO Mendiola.      Discussed with primary RN    Darcie Jung RN

## 2023-03-24 NOTE — PLAN OF CARE
Goal Outcome Evaluation:  Plan of Care Reviewed With: patient            SLP FEES evaluation completed. Will continue to address dysphagia. Please see note for further details and recommendations.

## 2023-03-24 NOTE — PROGRESS NOTES
Intensive Care Follow-up     Hospital:  LOS: 2 days   Ms. Sharmila Delarosa, 72 y.o. female is followed for:   Acute on chronic respiratory failure with hypoxia and hypercapnia (HCC)          Subjective   Interval History:  Chart reviewed. SLP cleared patient for nectar thick liquids and soft textures via FEES, however, nursing staff reports desaturation into the 80s afterwards with difficulty recovering. Currently on Bipap at 40%.     She did not wear Bipap last night per the charting. I have ordered a CXR. Appreciate Palliative Care's assistance with GOC. Patient reported she does not want CPR or mechanical ventilation, however, she is okay with full treatment.     ROS: Patient denies pain, chest pain, fever, or chills.      The patient's past medical, surgical and social history were reviewed and updated in Epic as appropriate.       Objective     Infusions:  phenylephrine, 0.5-3 mcg/kg/min, Last Rate: Stopped (03/23/23 0231)  sodium chloride, 75 mL/hr, Last Rate: 75 mL/hr (03/23/23 1700)      Medications:  budesonide, 0.5 mg, Nebulization, BID - RT  heparin (porcine), 5,000 Units, Subcutaneous, Q8H  ipratropium-albuterol, 3 mL, Nebulization, Q4H - RT  methylPREDNISolone sodium succinate, 40 mg, Intravenous, Q8H  pantoprazole, 40 mg, Intravenous, Q AM  piperacillin-tazobactam, 3.375 g, Intravenous, Q8H  sodium chloride, 10 mL, Intravenous, Q12H      I reviewed the patient's medications.    Vital Sign Min/Max for last 24 hours  Temp  Min: 97.8 °F (36.6 °C)  Max: 98.1 °F (36.7 °C)   BP  Min: 109/67  Max: 166/84   Pulse  Min: 84  Max: 118   Resp  Min: 16  Max: 26   SpO2  Min: 91 %  Max: 100 %   Flow (L/min)  Min: 40  Max: 45       Input/Output for last 24 hour shift  03/23 0701 - 03/24 0700  In: 2727.1 [I.V.:1820]  Out: 900 [Urine:900]        Physical Exam:  GENERAL: Patient lying in bed and quiet. No acute distress.   HEENT: Normocephalic and atraumatic. Trachea midline. PER. EOM WNL.   LUNGS: Chest rise of normal  depth and symmetric. Lungs clear to auscultation bilaterally. RUL and RML diminished   HEART: S1,S2 detected. Regular rate and rhythm. No rub, murmur, or gallop.   ABDOMEN: Soft, round, nondistended, and nontender. Bowel sounds present.   EXTREMITIES: No clubbing, edema, or cyanosis. Peripheral pulses present. Skin warm and dry.    NEURO/PSYCH: Wakes up to voice. Oriented x 3. Follows commands. Moves all extremities. No focal deficits.      Results from last 7 days   Lab Units 03/24/23  0641 03/23/23  0229 03/22/23  1751   WBC 10*3/mm3 11.98* 14.97* 20.32*   HEMOGLOBIN g/dL 9.2* 6.3* 7.0*   PLATELETS 10*3/mm3 161 179 191     Results from last 7 days   Lab Units 03/24/23  0641 03/23/23 0229 03/22/23  0616   SODIUM mmol/L 147* 146* 141   POTASSIUM mmol/L 4.5 4.2 4.7   CO2 mmol/L 40.0* 44.0* 42.0*   BUN mg/dL 34* 35* 24*   CREATININE mg/dL 0.83 1.10* 1.26*   MAGNESIUM mg/dL 2.5* 2.1 3.2*   PHOSPHORUS mg/dL 4.0 3.6 3.3   GLUCOSE mg/dL 91 112* 159*     Estimated Creatinine Clearance: 60.9 mL/min (by C-G formula based on SCr of 0.83 mg/dL).    Results from last 7 days   Lab Units 03/23/23  0431   PH, ARTERIAL pH units 7.314*   PCO2, ARTERIAL mm Hg 87.2*   PO2 ART mm Hg 61.1*       I reviewed the patient's new clinical results.  I reviewed the patient's new imaging results/reports including actual images and agree with reports.     Imaging Results (Last 24 Hours)     Procedure Component Value Units Date/Time    SLP FEES - Fiberoptic Endo Eval Swallow [[id number]] Resulted: 03/24/23 1030     Updated: 03/24/23 1030    Narrative:      This procedure was auto-finalized with no dictation required.          Assessment & Plan   Impression      AoC respiratory failure with hypoxia and hypercapnia    Stage 4 very severe COPD by GOLD classification    Bronchiectasis    Pulmonary nodule    RA (rheumatoid arthritis)    On chronic immunosuppression therapy    JORGITO    GERD    UTI (urinary tract infection)    Acute respiratory failure  with hypoxia and hypercarbia (HCC)    Anemia    Sharmila Delarosa is a 72 year-old female with PMH of former tobacco abuse, COPD, chronic hypoxic and hypercarbic respiratory failure on 2L NC at home, bronchiectasis, LLL pulmonary nodule, rheumatoid arthritis on chronic suppression therapy with prednisone, MTX, and Plaquenil, right breast cancer s/p lumpectomy and adjuvant chemo & XRT; currently on tamoxifen (2015), and GERD that was admitted on 3/21/23 for acute on chronic respiratory failure with hypoxia and hypercarbia. She has been in the ICU since admission on Bipap/HFNC, however, she has not been improving with oxygen requirements. SLP is following and FEES today showed no aspiration with nectar thick liquids. After the FEES and more nectar thick liquids, however, she has required an increase in FIO2 requirements.        Plan        1. For A on C respiratory failure with hypoxia and hypercapnia, Stage IV COPD, Bronchiectasis: Patient with increasing oxygen demand this morning after swallowing liquids. FEES demonstrated that she tolerated nectar thick liquids, however, she declined after further swallowing this morning. She is back on Bipap now. ABGs have shown respiratory acidosis with CO2 in 80s; will plan for Bipap at night with intermittent breaks for coughing. HFNC/NC as tolerated during the day.  Continue budesonide, duo-nebs, solu-medrol. F/u cultures which are negative to date. Will order respiratory culture.  2. For UTI, sepsis, JORGITO: No culture from admission, unfortunately. Continue Zosyn. BP has normalized and she is now hypertensive. Renal function has recovered and normalized.   3. For dysphagia: Patient passed FEES for soft foods and nectar thick liquids, however, her intake is poor and she fatigues quickly. She does not appear to tolerate an oral diet at the moment. We will place feeding tube and begin enteral nutrition until patient's strength improves.   4. For high BP/tachycardia: Patient  takes metoprolol at home. Will restart once feeding tube placed. PRN labetalol for now.   5. For RA on immunosuppressive therapy: Continue solu-medrol at current dosing for now. Continue to hold other immunosuppressive drugs. Complicates care.   6. For Anemia: Patient has a macrocytic anemia going back to September of 2022 which is the furthest back I am able to see. Folate and Vitamin B12 normal. She is s/p 2 units PRBCs on 3/23/23 and she has responded appropriately. Will continue to monitor. Might need further work-up later.   7. For Pulmonary nodule: 4 x 3.2 cm nodule in RUL and multiple nodules. She will need close follow-up with this.   8. Will restart home medications as appropriate once feeding tube has been placed  9. For GERD: PPI  10. AM labs    DVT Prophylaxis: Heparin sq  GI Prophylaxis: PPI  Dispo: Keep in ICU    High level of risk due to severe exacerbation of chronic illness and threat to life.   Plan of care and goals reviewed with multidisciplinary/antibiotic stewardship team during rounds.   I discussed the patient's findings and my recommendations with patient and nursing staff     Gifty Suero, MSN, APRN, AGACNP-BC  Pulmonary and Critical Care Medicine

## 2023-03-24 NOTE — MBS/VFSS/FEES
Acute Care - Speech Language Pathology   Swallow Initial Evaluation [facility]   Fiberoptic Endoscopic Evaluation of Swallowing (FEES)     Patient Name: Sharmila Delarosa  : 1950  MRN: [id number]  Today's Date: 3/24/2023               Admit Date: 3/21/2023    Visit Dx:     ICD-10-CM ICD-9-CM   1. Acute respiratory failure with hypoxia and hypercarbia (HCC)  J96.01 518.81    J96.02    2. Lethargy  R53.83 780.79   3. Hypoxia  R09.02 799.02   4. Stage 4 very severe COPD by GOLD classification  J44.9 496   5. AoC respiratory failure with hypoxia and hypercapnia  J96.21 518.84    J96.22 786.09     799.02   6. Oropharyngeal dysphagia  R13.12 787.22     Patient Active Problem List   Diagnosis   • Stage 4 very severe COPD by GOLD classification   • Bronchiectasis   • Chronic respiratory failure with hypoxia (HCC)   • Pulmonary nodule   • Former smoker   • AoC respiratory failure with hypoxia and hypercapnia   • RA (rheumatoid arthritis)   • On chronic immunosuppression therapy   • JORGITO   • GERD   • UTI (urinary tract infection)   • Acute respiratory failure with hypoxia and hypercarbia (HCC)     Past Medical History:   Diagnosis Date   • Anxiety    • Breast cancer (HCC) 2015    Right breast    • Depression    • Drug therapy    • GERD 3/21/2023   • Hx of radiation therapy    • On chronic immunosuppression therapy 3/21/2023   • RA (rheumatoid arthritis) 3/21/2023     Past Surgical History:   Procedure Laterality Date   • BREAST BIOPSY Right 2015    X 3 BX's   • BREAST LUMPECTOMY         SLP Recommendation and Plan  SLP Swallowing Diagnosis: mild, oral dysphagia, mild-moderate, pharyngeal dysphagia (23 0950)  SLP Diet Recommendation: soft to chew textures, chopped, no mixed consistencies, nectar thick liquids (23 0950)  Recommended Precautions and Strategies: liquid via spoon only, upright posture during/after eating, small bites of food and sips of liquid, no straw, 1:1 supervision, assist with feeding,  general aspiration precautions, fatigue precautions (03/24/23 0950)  SLP Rec. for Method of Medication Administration: meds whole, meds crushed, with puree, as tolerated (03/24/23 0950)     Monitor for Signs of Aspiration: notify SLP if any concerns (03/24/23 0950)     Swallow Criteria for Skilled Therapeutic Interventions Met: demonstrates skilled criteria (03/24/23 0950)  Anticipated Discharge Disposition (SLP): inpatient rehabilitation facility, anticipate therapy at next level of care (03/24/23 0950)  Rehab Potential/Prognosis, Swallowing: adequate, monitor progress closely (03/24/23 0950)  Therapy Frequency (Swallow): 5 days per week (03/24/23 0950)  Predicted Duration Therapy Intervention (Days): until discharge (03/24/23 0950)                                        Plan of Care Reviewed With: patient      SWALLOW EVALUATION (last 72 hours)     SLP Adult Swallow Evaluation     Row Name 03/24/23 0950 03/23/23 1255                Rehab Evaluation    Document Type evaluation  -MP evaluation  -CH       Subjective Information no complaints  -MP no complaints  -CH       Patient Observations alert;cooperative  -MP cooperative;agree to therapy;lethargic  -CH       Patient/Family/Caregiver Comments/Observations No family present  -MP none present  -CH       Patient Effort adequate  -MP adequate  -CH          General Information    Patient Profile Reviewed yes  -MP yes  -CH       Pertinent History Of Current Problem Respiratory failure, hypoxia, R mid & upper PNA, sepsis. Found unresponsive. CT head negative. Hx COPD, breast ca, GERD, bronchiectasis.  -MP acute resp failure, hypoxia, R PNA on CXR, CT head neg.  -CH       Current Method of Nutrition NPO  -MP NPO  -CH       Precautions/Limitations, Vision WFL  -MP --       Precautions/Limitations, Hearing WFL  -MP --       Prior Level of Function-Communication unknown  -MP --       Prior Level of Function-Swallowing unknown  -MP unknown  -CH       Plans/Goals Discussed  with patient  -MP patient;agreed upon  -       Barriers to Rehab medically complex  -MP none identified  -       Patient's Goals for Discharge return to PO diet  -MP --          Pain    Additional Documentation Pain Scale: FACES Pre/Post-Treatment (Group)  -MP Pain Scale: FACES Pre/Post-Treatment (Group)  -          Pain Scale: FACES Pre/Post-Treatment    Pain: FACES Scale, Pretreatment 0-->no hurt  -MP 0-->no hurt  -CH       Posttreatment Pain Rating 0-->no hurt  -MP 0-->no hurt  -CH          Oral Motor Structure and Function    Dentition Assessment -- natural, present and adequate  -       Secretion Management -- WNL/WFL  -CH       Mucosal Quality -- dry  -CH       Volitional Swallow -- delayed  -       Volitional Cough -- weak  -          Oral Musculature and Cranial Nerve Assessment    Oral Motor General Assessment -- generalized oral motor weakness;oral labial or buccal impairment  -       Oral Labial or Buccal Impairment, Detail, Cranial Nerve VII (Facial): -- left labial droop  -          General Eating/Swallowing Observations    Respiratory Support Currently in Use high-flow nasal cannula  - high-flow nasal cannula  -       O2 Liters -- other (see comments)  45  -CH       Eating/Swallowing Skills -- fed by SLP  -       Positioning During Eating -- upright 90 degree;upright in bed  -       Utensils Used -- spoon;cup;straw  -       Consistencies Trialed -- ice chips;thin liquids;pureed;nectar/syrup-thick liquids  -       Pre SpO2 (%) -- 95  -CH       Post SpO2 (%) -- 96  -CH          Respiratory    Respiratory Status -- WFL  -CH          Clinical Swallow Eval    Oral Prep Phase -- WFL  -CH       Pharyngeal Phase -- suspected pharyngeal impairment  -       Clinical Swallow Evaluation Summary -- Multiple swallows with all consistency trials.  Wet vocal quality with thin liquids by spoon, cup and straw. Recommend continued NPO with FEES to further evaluate. Patient may have 4-5 ice  chips per hr with supervision after oral care at MD discretion.  -CH          Pharyngeal Phase Concerns    Pharyngeal Phase Concerns -- wet vocal quality;multiple swallows  -CH       Wet Vocal Quality -- thin  -CH       Multiple Swallows -- thin;nectar;pudding  -          Fiberoptic Endoscopic Evaluation of Swallowing (FEES)    Risks/Benefits Reviewed risks/benefits explained;patient;agreed to eval  -MP --       Nasal Entry right:  -MP --       Scope serial number/identification [id number]  -MP --          Anatomy and Physiology    Anatomic Considerations no anatomic structural deviation  -MP --       Velopharyngeal WFL  -MP --       Base of Tongue symmetrical  -MP --       Epiglottis WFL  -MP --       Laryngeal Function Breathing symmetrical  -MP --       Laryngeal Function Phonation symmetrical  -MP --       Laryngeal Function to Breath Hold TVF contact  -MP --       Secretion Rating Scale (Francisco et al. 1996) 0- normal, no visible secretions  -MP --       Ice Chips DNA  -MP --       Spontaneous Swallow frequency adequate  -MP --       Sensory sensed scope  -MP --       Consistencies Trialed thin liquids;nectar-thick liquids;pudding/puree;regular textures  -MP --          FEES Interpretation    Oral Phase prolonged manipulation;prespill of liquids into pharynx  -MP --          Initiation of Pharyngeal Swallow    Initiation of Pharyngeal Swallow bolus in pyriform sinuses  -MP --       Pharyngeal Phase impaired pharyngeal phase of swallowing  -MP --       Penetration Before the Swallow thin liquids;secondary to reduced back of tongue control;secondary to delayed swallow initiation or mistiming  -MP --       Penetration During the Swallow nectar-thick liquids;secondary to delayed swallow initiation or mistiming;secondary to reduced laryngeal elevation;secondary to reduced vestibular closure;other (see comments)  w/ large bolus/consecutive sips  -MP --       Aspiration During the Swallow thin liquids;secondary to delayed  swallow initiation or mistiming;secondary to reduced vestibular closure;secondary to reduced laryngeal elevation  -MP --       Aspiration After the Swallow thin liquids;secondary to residue;in pyriform sinuses;in laryngeal vestibule  -MP --       Response to Penetration No  -MP --       Response to Aspiration No  -MP --       No spontaneous response to aspiration with effective subglottic clearance with cue (see comments)  -MP --       Residue thin liquids;diffuse within pharynx;secondary to reduced base of tongue retraction;secondary to reduced laryngeal elevation;secondary to reduced hyolaryngeal excursion  -MP --       Attempted Compensatory Maneuvers bolus size;bolus presentation style  -MP --       Response to Attempted Compensatory Maneuvers prevented penetration;prevented aspiration  -MP --       FEES Summary Mild oral & mild-moderate pharyngeal dysphagia. Reduced lingual control resulting in pre-spill of liquids into the pharynx. Aspiration before/during/after the swallow w/ thin liquids. Silent aspiration, but eventually cleared subglottic & vestibular material with multiple cued coughs. Deep penetration w/ large bolus/consecutive sips of nectar-thick liquids- able to prevent w/ controlled bolus size. Of note, after scope removed and study completed, pt given sips of nectar-thick H2O and began coughing. Appeared to help if given tsp size bolus/from spoon. Rec: soft/chopped diet, no mixed consistencies, nectar-thick liquids via tsp only, meds in pudding/puree.  -MP --          SLP Evaluation Clinical Impression    SLP Swallowing Diagnosis mild;oral dysphagia;mild-moderate;pharyngeal dysphagia  -MP functional oral phase;suspected pharyngeal dysphagia  -CH       Functional Impact risk of aspiration/pneumonia  -MP risk of aspiration/pneumonia;risk of malnutrition;risk of dehydration  -CH       Rehab Potential/Prognosis, Swallowing adequate, monitor progress closely  -MP good, to achieve stated therapy goals   -       Swallow Criteria for Skilled Therapeutic Interventions Met demonstrates skilled criteria  - demonstrates skilled criteria  -          Recommendations    Therapy Frequency (Swallow) 5 days per week  - --       Predicted Duration Therapy Intervention (Days) until discharge  - until discharge  -       SLP Diet Recommendation soft to chew textures;chopped;no mixed consistencies;nectar thick liquids  -MP NPO;other (see comments)  4-5 ice chips after oral care with supervision  -       Recommended Diagnostics -- FEES  -       Recommended Precautions and Strategies liquid via spoon only;upright posture during/after eating;small bites of food and sips of liquid;no straw;1:1 supervision;assist with feeding;general aspiration precautions;fatigue precautions  - upright posture during/after eating;general aspiration precautions  -       Oral Care Recommendations Oral Care BID/PRN  - Oral Care BID/PRN  -       SLP Rec. for Method of Medication Administration meds whole;meds crushed;with puree;as tolerated  - meds via alternate route  -       Monitor for Signs of Aspiration notify SLP if any concerns  - notify SLP if any concerns  -       Anticipated Discharge Disposition (SLP) inpatient rehabilitation facility;anticipate therapy at next level of care  - unknown;anticipate therapy at next level of care  -             User Key  (r) = Recorded By, (t) = Taken By, (c) = Cosigned By    Initials Name Effective Dates    MP Av Medel, MS CCC-SLP 12/28/21 -      Jenny Elizondo MS CCC-SLP 06/16/21 -                 EDUCATION  The patient has been educated in the following areas:   Dysphagia (Swallowing Impairment) Modified Diet Instruction.        SLP GOALS     Row Name 03/24/23 0950             (LTG) Patient will demonstrate functional swallow for    Diet Texture (Demonstrate functional swallow) regular textures  -      Liquid viscosity (Demonstrate functional swallow) thin  liquids  -MP      [place] (Demonstrate functional swallow) independently (over 90% accuracy)  -MP      Time Frame (Demonstrate functional swallow) by discharge  -MP         (STG) Patient will tolerate trials of    Consistencies Trialed (Tolerate trials) soft to chew (chopped) textures;nectar/ mildly thick liquids  -MP      Desired Outcome (Tolerate trials) without signs/symptoms of aspiration;without signs of distress;with adequate oral prep/transit/clearance  -MP      [place] (Tolerate trials) independently (over 90% accuracy)  -MP      Time Frame (Tolerate trials) by discharge  -MP         (STG) Patient will tolerate therapeutic trials of    Consistencies Trialed (Tolerate therapeutic trials) thin liquids  -MP      Desired Outcome (Tolerate therapeutic trials) without signs/symptoms of aspiration;without signs of distress  -MP      [place] (Tolerate therapeutic trials) independently (over 90% accuracy)  -MP      Time Frame (Tolerate therapeutic trials) by discharge  -MP         (STG) Lingual Strengthening Goal 1 (SLP)    Activity (Lingual Strengthening Goal 1, SLP) increase tongue back strength  -MP      Increase Tongue Back Strength lingual resistance exercises;swallow trials  -MP      [place]/Accuracy (Lingual Strengthening Goal 1, SLP) with minimal cues (75-90% accuracy)  -MP      Time Frame (Lingual Strengthening Goal 1, SLP) short term goal (STG)  -MP         (STG) Pharyngeal Strengthening Exercise Goal 1 (SLP)    Activity (Pharyngeal Strengthening Goal 1, SLP) increase timing;increase superior movement of the hyolaryngeal complex;increase anterior movement of the hyolaryngeal complex;increase closure at entrance to airway/closure of airway at glottis;increase squeeze/positive pressure generation  -MP      Increase Timing prepping - 3 second prep or suck swallow or 3-step swallow  -MP      Increase Superior Movement of the Hyolaryngeal Complex effortful pitch glide (falsetto + pharyngeal  squeeze)  -MP      Increase Anterior Movement of the Hyolaryngeal Complex chin tuck against resistance (CTAR)  -MP      Increase Closure at Entrance to Airway/Closure of Airway at Glottis supraglottic swallow  -MP      Increase Squeeze/Positive Pressure Generation hard effortful swallow  -MP      [place]/Accuracy (Pharyngeal Strengthening Goal 1, SLP) with minimal cues (75-90% accuracy)  -MP      Time Frame (Pharyngeal Strengthening Goal 1, SLP) short term goal (STG)  -MP         (STG) Swallow Management Recall Goal 1 (SLP)    Activity (Swallow Management Recall Goal 1, SLP) recall of;compensatory swallow strategies/techniques  -MP      [place]/Accuracy (Swallow Management Recall Goal 1, SLP) with minimal cues (75-90% accuracy)  -MP      Time Frame (Swallow Management Recall Goal 1, SLP) short term goal (STG)  -MP         (STG) Swallow Compensatory Strategies Goal 1 (SLP)    Activity (Swallow Compensatory Strategies/Techniques Goal 1, SLP) liquids by teaspoon only;during p.o. trials;during meal intake  -MP      [place]/Accuracy (Swallow Compensatory Strategies/Techniques Goal 1, SLP) with minimal cues (75-90% accuracy)  -MP      Time Frame (Swallow Compensatory Strategies/Techniques Goal 1, SLP) short term goal (STG)  -MP            User Key  (r) = Recorded By, (t) = Taken By, (c) = Cosigned By    Initials Name Provider Type    Av Carroll MS CCC-SLP Speech and Language Pathologist                   Time Calculation:    Time Calculation- SLP     Row Name 03/24/23 1102             Time Calculation- SLP    SLP Start Time 0950  -MP      SLP Received On 03/24/23  -MP         Untimed Charges    09966-GX Fiberoptic Endo Eval Swallow Minutes 100  -MP         Total Minutes    Untimed Charges Total Minutes 100  -MP       Total Minutes 100  -MP            User Key  (r) = Recorded By, (t) = Taken By, (c) = Cosigned By    Initials Name Provider Type    Av Carroll MS CCC-SLP Speech and  Language Pathologist                Therapy Charges for Today     Code Description Service Date Service Provider Modifiers Qty    [id number]  ST FIBEROPTIC ENDO EVAL SWALL 7 3/24/2023 Av Medel, MS CCC-SLP GN 1               Av Cantu MS CCC-SLP  3/24/2023

## 2023-03-24 NOTE — PLAN OF CARE
Goal Outcome Evaluation:     No changes.    Patient alert x 4, yet confused.    Patient is removing oxygen. SpO2 drops quickly after patient removes o2 and takes a while to recover.    RN at computer outside room to prevent o2 removal.

## 2023-03-24 NOTE — PROGRESS NOTES
"Palliative Care Daily Progress Note     C/C: Patient complaining of SOA.     S: Medical record reviewed. Follow up visit for GOC in the context of complex medical decision making. Events noted. Patient with FEES completed with recommendation for nectar thick liquids and soft textures. RN reports patient desatted into 80's after nectar thick liquids and difficulty recovering. Currently on BiPAP at 40% FiO2. Patient alert and able to state name, location and year. Reports she does not want MV nor would want CPR, however does want continued Full Treatment. She wants this writer to call and tell her son Christian her desire for no MV or CPR.     ROS: +SOA, currently on BiPAP. +cough. +fatigue, due to SOA. +debility. Denies pain, N/V/D.      O: Code Status:   Code Status and Medical Interventions:   Ordered at: 03/24/23 1156     Medical Intervention Limits:    NO intubation (DNI)     Level Of Support Discussed With:    Patient     Code Status (Patient has no pulse and is not breathing):    No CPR (Do Not Attempt to Resuscitate)     Medical Interventions (Patient has pulse or is breathing):    Limited Support     Release to patient:    Routine Release      Advanced Directives: Advance Directive Status: Patient has advance directive, copy in chart   Goals of Care: Ongoing.   Palliative Performance Scale Score: 30%     /82   Pulse 108   Temp 98.1 °F (36.7 °C) (Oral)   Resp 22   Ht 167.6 cm (66\")   Wt 61.2 kg (134 lb 14.7 oz)   SpO2 94%   BMI 21.78 kg/m²     Intake/Output Summary (Last 24 hours) at 3/24/2023 1157  Last data filed at 3/24/2023 0501  Gross per 24 hour   Intake 2019.66 ml   Output 900 ml   Net 1119.66 ml       PE:  General Appearance:    Patient laying in bed, A/C ill appearing, frail, follows commands, opens eyes on command, answers questions appropriately NAD   HEENT:    NC/AT, EOMI, anicteric, MM dry, BiPAP mask in place   Neck:   supple, trachea midline, no JVD   Lungs:      diminished in bases; " WOB increased with speaking and at rest; RR 22-24 on exam, BiPAP 40% FiO2    Heart:    RRR, normal S1 and S2, no M/R/G,  on monitor   Abdomen:     Normal bowel sounds, soft, nontender, nondistended   G/U:   Deferred   MSK/Extremities:   Wasting, no edema   Pulses:   Pulses palpable and equal bilaterally   Skin:   Warm, dry   Neurologic:   A/Ox3, cooperative, answers all questions appropriately   Psych:   Calm, appropriate       Meds: Reviewed and changes noted    Labs:   Results from last 7 days   Lab Units 03/24/23  0641   WBC 10*3/mm3 11.98*   HEMOGLOBIN g/dL 9.2*   HEMATOCRIT % 33.1*   PLATELETS 10*3/mm3 161     Results from last 7 days   Lab Units 03/24/23  0641   SODIUM mmol/L 147*   POTASSIUM mmol/L 4.5   CHLORIDE mmol/L 102   CO2 mmol/L 40.0*   BUN mg/dL 34*   CREATININE mg/dL 0.83   GLUCOSE mg/dL 91   CALCIUM mg/dL 8.9     Results from last 7 days   Lab Units 03/24/23  0641 03/23/23  0229   SODIUM mmol/L 147* 146*   POTASSIUM mmol/L 4.5 4.2   CHLORIDE mmol/L 102 99   CO2 mmol/L 40.0* 44.0*   BUN mg/dL 34* 35*   CREATININE mg/dL 0.83 1.10*   CALCIUM mg/dL 8.9 8.4*   BILIRUBIN mg/dL  --  0.3   ALK PHOS U/L  --  57   ALT (SGPT) U/L  --  16   AST (SGOT) U/L  --  29   GLUCOSE mg/dL 91 112*     Imaging Results (Last 72 Hours)     Procedure Component Value Units Date/Time    SLP FEES - Fiberoptic Endo Eval Swallow [[id number]] Resulted: 03/24/23 1030     Updated: 03/24/23 1030    Narrative:      This procedure was auto-finalized with no dictation required.    XR Chest 1 View [[id number]] Collected: 03/23/23 0851     Updated: 03/23/23 0856    Narrative:      XR CHEST 1 VW    Date of Exam: 3/23/2023 4:05 AM EDT    Indication: follow up  follow up.    Comparison: 3/21/2023    Findings:  Right upper lobe pneumonia appears unchanged. There is no new focal consolidation on the left. There is no effusion or pneumothorax. Unchanged heart and mediastinal contours.      Impression:      Impression:  No significant  interval change.    Electronically Signed: Vinay Chowdary    3/23/2023 8:53 AM EDT    Workstation ID: [id number]    CT Angiogram Chest Pulmonary Embolism [[id number]] Collected: 03/21/23 2228     Updated: 03/22/23 0040    Narrative:      Exam: CTA thorax, PE protocol    Date: March 21, 2023    History: Shortness of breath    Comparison: None available    Technique: Contiguous axial CT images obtained of the thorax following the uneventful intravenous administration of 80 mL Isovue-370 contrast. Additionally, sagittal, coronal and 3-D reformatted images were obtained. CT dose lowering techniques were   used, to include: automated exposure control, adjustment for patient size, and or use of iterative reconstruction.    Findings:    Thoracic aorta: There are moderate atherosclerotic changes. There is no aneurysm or dissection.    HEART: The heart size is prominent. The right heart is asymmetrically enlarged.    Pulmonary arteries: The pulmonary arteries are well visualized. There is no filling defect to suggest an acute pulmonary embolus.    Mediastinum: There is a calcified mediastinal lymph nodes. There is an enlarged right hilar lymph node measuring 1.9 cm in short axis. There are additional borderline and mildly prominent right hilar lymph nodes.    Lung parenchyma: There is a background of emphysema. There is alveolar and groundglass airspace disease in the right upper lobe consistent with pneumonia. There is a more lobular pleural-based mass in the inferior aspect of the right upper lobe measuring   4 x 3.2 cm on series 5 image 49. There is bronchial wall thickening with mild bronchiectasis in the right lower lobe. There are subtle areas of mucous plugging involving the bronchioles to the right lower lobe. There is a very small right-sided pleural   effusion, loculated along the lateral aspect of the right lower lobe. There is miliary nodularity identified in both lungs. There are additional slightly larger pulmonary  nodules in the left lower lobe, one of which measures 8 mm on series 5 image 61.   There is no pneumothorax.    Upper abdomen: The gallbladder is surgically absent. There is a nonobstructing 6 mm right renal calculus.    Osseous structures: There are advanced degenerative changes involving both shoulders. There are moderate degenerative changes in the spine. No acute fractures are identified.      Impression:      1. Right upper lobe pneumonia. There is also a more lobular pleural-based mass in the inferior aspect of the right upper lobe measuring 4 x 3.2 cm. This could represent consolidative pneumonia or soft tissue neoplasm. Continuous radiographic follow-up to   complete resolution is mandatory. A short-term follow-up CT thorax after treatment is recommended.   2. Emphysema.  3. Bronchial wall thickening likely representing bronchiolitis. There is bronchiectasis in the right lower lobe. There are areas of mucous plugging involving the bronchioles to the right lower lobe. Differential considerations would include infectious   bronchiolitis or an aspiration event.  4. Miliary type nodularity throughout both lungs most likely representing infectious bronchiolitis. Atypical infectious etiologies could also be considered, including fungal infections.  5. There are slightly larger pulmonary nodules in the left lower lobe, one of which measures 8 mm. These are favored to be infectious or inflammatory. Follow-up is recommended per Fleischner guidelines below.  6. Right hilar adenopathy. These lymph nodes could be reactive. Additional consideration would include metastatic disease. A follow-up PET/CT study could be considered for better characterization as determined clinically.  7. Cardiomegaly with asymmetric enlargement of the right heart.  8. Nonobstructing 6 mm right renal calculus.  9. Very small loculated right-sided pleural effusion. This could represent a small empyema.  10. No visualized pulmonary  embolus.    *FLEISCHNER SOCIETY GUIDELINES:  Low risk patient:  <6mm - Low Risk, no further f/u  >6-8mm - low dose CT 6-12 mo, then 2nd f/u CT at 18-24mo  >8mm - low dose CT 3,9,24mo OR PET/CT OR Biopsy    High Risk Patient:  <6mm - optional CT at 12 mo  >6-8mm - low dose CT 6-12 mo, then 2nd f/u CT at 18-24mo  >8mm - low dose CT at 3 mo, OR PET/CT OR Biopsy    MULTIPLE NODULES:  Low Risk Patient:  <6mm - Low Risk, no further f/u  >6-8mm - low dose CT 3-6 mo, then 2nd f/u CT at 18-24mo  >8mm - low dose CT 3-6 mo, then 2nd f/u CT at 18-24mo    High Risk Patient:  <6mm - High risk, multiple nodules, optional CT at 12 mo  >6-8mm - low dose CT 3-6 mo, then 2nd f/u CT at 18-24mo  >8mm - low dose CT 3-6 mo, then 2nd f/u CT at 18-24mo      Slot 63    Electronically signed by:  Chandan Yañez M.D.    3/21/2023 10:39 PM Mountain Time    CT Head Without Contrast [[id number]] Collected: 03/21/23 2229     Updated: 03/22/23 0032    Narrative:      EXAMINATION: CT HEAD WO CONTRAST    DATE: 3/21/2023 10:46 PM     INDICATION: Altered mental status     COMPARISON: None available.     TECHNIQUE: Thin section noncontrast axial images were obtained through the head. Coronal reformats were created.  CT dose lowering techniques were used, to include: automated exposure control, adjustment for patient size, and or use of iterative   reconstruction.     FINDINGS:     Intracranial contents:    No acute intracranial hemorrhage, evidence of acute territorial infarct, mass, mass effect or hydrocephalus. Mild global cerebral volume loss. Mild chronic small vessel ischemic changes in the white matter and mild intracranial atherosclerosis.     Bones and extracranial soft tissues:     No fracture or focal osseous lesion in the calvarium or skull base. Paranasal sinuses are clear where visualized. Mastoid air cells and middle ear cavities are clear bilaterally. Bilateral cataract surgery. No acute findings in the orbits. 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         Impression:         No acute intracranial abnormality visible by CT.          This examination was interpreted by Sanjay Cast M.D.     Electronically signed by:  Sanjay Cast M.D.    3/21/2023 10:31 PM Mountain Time    XR Chest 1 View [[id number]] Collected: 03/21/23 2314     Updated: 03/21/23 2318    Narrative:      XR CHEST 1 VW    Date of Exam: 3/21/2023 9:40 PM EDT    Indication: SOA triage protocol.    Comparison: None available.    Findings:  There are patchy parenchymal opacities in the right upper and mid lung compatible with pneumonia. No pleural effusion. No pneumothorax. Normal cardiomediastinal silhouette.      Impression:      Impression:  Right lung pneumonia.    Electronically Signed: Derek Callahan    3/21/2023 11:15 PM EDT    Workstation ID: [id number]                Diagnostics: Reviewed    A:   AoC respiratory failure with hypoxia and hypercapnia    Stage 4 very severe COPD by GOLD classification    Bronchiectasis    Pulmonary nodule    RA (rheumatoid arthritis)    On chronic immunosuppression therapy    JORGITO    GERD    UTI (urinary tract infection)    Acute respiratory failure with hypoxia and hypercarbia (HCC)     72 y.o. female with COPD, bronchiectasis, JORGITO, GERD, PNA, UTI.      S/S:   1. Dyspnea -currently on BiPAP/HFNC  -continue nebs, steroids, ABX  -recommend Morphine 5mg PO q 3 hours prn dyspnea     2. Dysphagia -patient cleared by SLP for nectar thick/soft texture  -concern patient will not be able to receive enough calories due to increased fatigue and SOA  -discussed artificial nutrition versus comfort diet with son      3. Debility -recommend PT/OT evaluation if respiratory status allows     4. GOC -DNR/DNI -per discussion with patient and phone call with son  -patient reports that she wants to continue Full Treatment with limit of no MV or CPR  -called and spoke to son, Christian, at 11:49pm, discussed patient's decision for DNR/DNI  and he is in agreement with patient's decision, discussed FEES result and concern for decreased intake due to lack of reserve, discussed alternate nutrition with TF temporary versus permanent    ADDENDUM: Return visit to discuss if patient agreeable to TF. She reports that she would want a TF. RN updated on patient election.     P: Patient alert and oriented x3. She is very clear about wanting to continue Full Treatment but does not want MV or CPR. She would like this writer to call her son and let him know. Called Christian and discussed patient's wishes. He is in agreement with DNR/DNI with continued Full Treatment. Discussed patient's FEES result along with TF temporary versus long term in regards to prolongation of life/LW. All questions and concerns addressed.   Palliative Care Team will continue to follow patient. Please do not hesitate to contact us regarding further sx mgmt or GOC needs.  Makayla Roper, APRN  3/24/2023  Time spent: 40 minutes

## 2023-03-24 NOTE — CASE MANAGEMENT/SOCIAL WORK
Continued Stay Note  [facility]     Patient Name: Sharmila Delarosa  MRN: [id number]  Today's Date: 3/24/2023    Admit Date: 3/21/2023    Plan: Ongoing   Discharge Plan     Row Name 03/24/23 1546       Plan    Plan Ongoing      Plan Comments Ms. Delarosa remains dependent on Bipap and high flow nasal cannula. She has chosen to be DNR/DNI. Case management will continue to follow Ms. Delarosa's progress and provide for her a safe discharge plan.               Discharge Codes    No documentation.               Expected Discharge Date and Time     Expected Discharge Date Expected Discharge Time    Mar 31, 2023             Iliana Reid RN

## 2023-03-25 NOTE — CONSULTS
Clinical Nutrition     Nutrition Support Assessment  Reason for Visit: EN (RN consult)      Patient Name: Sharmila Delarosa  YOB: 1950  MRN: [id number]  Date of Encounter: 03/25/23 12:39 EDT  Admission date: 3/21/2023    Comments:    -RD ordered the following continuous EN regimen:  Initiate Fibersource HN @ 25 ml/hr and advance as tolerated by 25 ml/hr Q 8 hours to goal rate 75 ml/hr. Water flush @ 25 ml/hr.  At goal rate this regimen provides 1500 ml formula, 1800 calories (106% est needs), 81 g protein (101% est needs), 23 g fiber, 1215 ml free water from formula, 1715 ml total water from formula/flushes.    -Whenever pt is using Bipap, please hold EN. Recommend advancing feeding tube to post-pyloric so that EN won't need to be held when on Bipap.    -Once EN is initiated, recommend adjusting IVF as appropriate.      Nutrition Assessment   Admission Diagnosis:  Acute respiratory failure with hypoxia and hypercarbia (HCC) [J96.01, J96.02]      Problem List:    AoC respiratory failure with hypoxia and hypercapnia    Stage 4 very severe COPD by GOLD classification    Bronchiectasis    Pulmonary nodule    RA (rheumatoid arthritis)    On chronic immunosuppression therapy    JORGITO    GERD    UTI (urinary tract infection)    Acute respiratory failure with hypoxia and hypercarbia (HCC)    Anemia        PMH:   She  has a past medical history of Anxiety, Breast cancer (HCC) (2015), Depression, Drug therapy, GERD (3/21/2023), radiation therapy, On chronic immunosuppression therapy (3/21/2023), and RA (rheumatoid arthritis) (3/21/2023).    PSH:  She  has a past surgical history that includes Breast lumpectomy and Breast biopsy (Right, 2015).      Applicable Nutrition Concerns:   Skin:  Oral:  GI:      Applicable Interval History:   (3/23) SLP eval - NPO  (3/24) SLP FEES - soft textures/chopped meats/nectar thick liquids;  Small bore NG tube placed for EN      Reported/Observed/Food/Nutrition  "Related History:   RN reports patient had PO diet initiated yesterday, but whenever pt takes a bit or sip of something she coughs and her oxygen sats decrease. Pt to be NPO for now. A small bore feeding tube was placed yesterday and EN will be initiated today (pt in agreement per palliative care notes). Note NKFA.    Pt has been requiring both Bipap and HFNC. RD recommended feeding tube be advanced to post-pyloric given use of Bipap. If unable to advance, will need to hold EN during Bipap use.      Labs    Labs Reviewed: Yes     Results from last 7 days   Lab Units 03/25/23  0513 03/24/23  0641 03/23/23 0229 03/22/23  0616 03/21/23 2129   GLUCOSE mg/dL 89 91 112*   < > 164*   BUN mg/dL 38* 34* 35*   < > 24*   CREATININE mg/dL 0.88 0.83 1.10*   < > 1.23*   SODIUM mmol/L 148* 147* 146*   < > 145   CHLORIDE mmol/L 104 102 99   < > 88*   POTASSIUM mmol/L 4.6 4.5 4.2   < > 3.1*   PHOSPHORUS mg/dL 4.3 4.0 3.6   < >  --    MAGNESIUM mg/dL 1.9 2.5* 2.1   < > 0.8*   ALT (SGPT) U/L  --   --  16  --  9    < > = values in this interval not displayed.       Results from last 7 days   Lab Units 03/23/23 0229 03/21/23 2129   ALBUMIN g/dL 3.0* 3.2*       Results from last 7 days   Lab Units 03/21/23  2144   GLUCOSE mg/dL 145*     No results found for: HGBA1C      Results from last 7 days   Lab Units 03/21/23 2129   PROBNP pg/mL 3,834.0*       Medications    Medications Reviewed: Yes  Pertinent  Scheduled: steroid, protonix, antibiotic  Infusion: NS @ 75 ml/hr  PRN:     Intake/Ouptut 24 hrs (0701 - 0700)   I&O's Reviewed: Yes     Last documented BM (3/24)    Anthropometrics     Flowsheet Rows    Flowsheet Row First Filed Value   Admission Height 167.6 cm (66\") Documented at 03/21/2023 2138   Admission Weight 59 kg (130 lb) Documented at 03/21/2023 2138          Height: Height: 167.6 cm (66\")  Last Filed Weight: Weight: 63.7 kg (140 lb 6.9 oz) (03/25/23 0400)  Method: Weight Method: Bed scale  BMI: BMI (Calculated): 22.7  BMI " "classification: Normal: 18.5-24.9kg/m2  IBW:  130 lbs    UBW:  Last 15 Recorded Weights  View Complete Flowsheet  Weight Weight (kg) Weight (lbs) Weight Method VISIT REPORT   3/25/2023 63.7 kg 140 lb 6.9 oz Bed scale -   3/24/2023 63.2 kg 139 lb 5.3 oz Bed scale -   3/24/2023 61.2 kg 134 lb 14.7 oz Bed scale -   3/23/2023 61 kg 134 lb 7.7 oz Bed scale -   3/23/2023 59.421 kg 131 lb - -   3/23/2023 59.7 kg 131 lb 9.8 oz Bed scale -   3/21/2023 58.968 kg 130 lb Estimated -   9/15/2022 59.33 kg 130 lb 12.8 oz - Report   9/8/2022 59.875 kg 132 lb - -       Nutrition Focused Physical Exam     Date:   Unable to perform exam due to: Defer pending indication     Needs Assessment   Date: 3/25    Height used:Height: 167.6 cm (66\")  Weights used: 131 lbs/59.5 kg (bed scale wt on admission)      Estimated Calorie needs: ~1700 calories daily  Method: 25-30 Kcals/KG = 2468-3311  Method:  MSJ = 1132 x 1.4 =     Estimated Protein needs: ~80 g protein daily  Method: 1.2-1.5 g/Kg = 71-89    Estimated Fluid needs:  Per clinical status      Current Nutrition Prescription     PO: NPO Diet NPO Type: Tube Feeding    Intake: Insufficient data      Nutrition Diagnosis     Date:  Updated:   Problem Inadequate oral intake   Etiology Coughing/respiratory distress with oral intake   Signs/Symptoms NPO today, plan for EN initiation   Status:     Goal:   General: Nutrition support treatment, Palliative care  PO: Advace diet as medically feasible/appropriate  EN/PN: Initiate EN, Establish EN tolerance, Tolerate EN at goal    Nutrition Intervention      Follow treatment progress, Care plan reviewed, Nutrition support order placed    -RD ordered the following continuous EN regimen:  Initiate Fibersource HN @ 25 ml/hr and advance as tolerated by 25 ml/hr Q 8 hours to goal rate 75 ml/hr. Water flush @ 25 ml/hr.  At goal rate this regimen provides 1500 ml formula, 1800 calories (106% est needs), 81 g protein (101% est needs), 23 g fiber, 1215 ml free " water from formula, 1715 ml total water from formula/flushes.    -Whenever pt is using Bipap, please hold EN. Recommend advancing feeding tube to post-pyloric so that EN won't need to be held when on Bipap.    -Once EN is initiated, recommend adjusting IVF as appropriate.      Monitoring/Evaluation:   Per protocol, I&O, Pertinent labs, EN delivery/tolerance, Weight, Symptoms, POC/GOC, Swallow function      Jinny Camacho, RD  Time Spent: 45 min

## 2023-03-25 NOTE — PLAN OF CARE
Goal Outcome Evaluation:      Restraints on to keep patient's high flow nasal cannula on.    NG tube placed during day shift 3/24.    No significant changes.     Occasional hypertension.

## 2023-03-25 NOTE — PLAN OF CARE
Goal Outcome Evaluation:  Plan of Care Reviewed With: patient        Progress: no change  Outcome Evaluation: Alert, but confused. NPO as of today despite passing FEES. With oral intake coughing occurs and oxygen saturation decompensates. Currently on BIPAP at 40% FiO2. Keofeed replaced, post pyloric. Additional dose of Metoprolol was given due to Atrial Fibrillation RVR. APRN notified.

## 2023-03-25 NOTE — PROGRESS NOTES
Pulmonary/Critical Care History and Physical Exam     LOS: 3 days   Patient Care Team:  Bala Gomez MD as PCP - General (Family Medicine)    Chief Complaint:    Chief Complaint   Patient presents with   • Respiratory Distress       Subjective     Interval History:  Chart reviewed. VSS on 45 L/50% HFNC. Only complaint is that she is thirsty.     History taken from: patient    Past Medical History:   Diagnosis Date   • Anxiety    • Breast cancer (HCC)     Right breast    • Depression    • Drug therapy    • GERD 3/21/2023   • Hx of radiation therapy    • On chronic immunosuppression therapy 3/21/2023   • RA (rheumatoid arthritis) 3/21/2023       Past Surgical History:   Procedure Laterality Date   • BREAST BIOPSY Right 2015    X 3 BX's   • BREAST LUMPECTOMY         Family History   Problem Relation Age of Onset   • Aortic stenosis Mother    • Melanoma Father    • Ovarian cancer Sister    • Breast cancer Sister    • Rectal cancer Sister    • Breast cancer Maternal Grandmother    • Breast cancer Daughter        Social History     Socioeconomic History   • Marital status:    Tobacco Use   • Smoking status: Former     Packs/day: 1.00     Years: 40.00     Pack years: 40.00     Types: Cigarettes     Quit date:      Years since quittin.2   • Smokeless tobacco: Never   Vaping Use   • Vaping Use: Never used   Substance and Sexual Activity   • Alcohol use: Never   • Drug use: Never   • Sexual activity: Defer       Allergies   Allergen Reactions   • Cephalosporins Unknown - Low Severity       PMH/FH/SocH were reviewed by me and updates were made.     Review of Systems:    All systems were reviewed and negative except as noted in subjective.      Objective     Vital Signs  Temp:  [97.6 °F (36.4 °C)-98.3 °F (36.8 °C)] 98.3 °F (36.8 °C)  Heart Rate:  [] 96  Resp:  [20-25] 22  BP: (125-166)/(58-85) 161/74    Physical Exam:     General Appearance:    Resting in bed. Alert, cooperative, in no acute distress    Head:    Normocephalic, without obvious abnormality, atraumatic   Eyes:            Lids and lashes normal, conjunctivae and sclerae normal, no   icterus, no pallor, corneas clear, PERRLA   ENMT:   Ears appear intact with no abnormalities noted      No oral lesions, no thrush, oral mucosa moist      No adenopathy, supple, trachea midline, no thyromegaly, no   carotid bruit, no JVD       Lungs/resp:     Normal effort, symmetric chest rise, no crepitus, clear to      auscultation bilaterally, no chest wall tenderness, resonant to percussion throughout.                  Heart/CV:    Regular rhythm and normal rate, normal S1 and S2, no            murmur, no gallop, no rub, no click   Abdomen/GI:     Normal bowel sounds, no masses, no organomegaly, soft        non-tender, non-distended, no guarding, no rebound                tenderness   G/U:     Deferred   Extremities/MSK:   No clubbing, cyanosis or edema.  No deformities.    Pulses:   Pulses palpable and equal bilaterally   Skin:   No bleeding, bruising or rash   Hem/Lymph:   No cervical or supraclavicular adenopathy.    Neurologic:   Moves all extremities with no obvious focal motor deficit.  Cranial nerves 2 - 12 grossly intact            Psychiatric:  Oriented but some confused conversation     Results Review:     I reviewed the patient's new clinical results.   Results from last 7 days   Lab Units 03/25/23  0513 03/24/23  0641 03/23/23  0229 03/22/23  0616 03/21/23  2129   SODIUM mmol/L 148* 147* 146*   < > 145   POTASSIUM mmol/L 4.6 4.5 4.2   < > 3.1*   CHLORIDE mmol/L 104 102 99   < > 88*   CO2 mmol/L 41.0* 40.0* 44.0*   < > >50.0*   BUN mg/dL 38* 34* 35*   < > 24*   CREATININE mg/dL 0.88 0.83 1.10*   < > 1.23*   CALCIUM mg/dL 9.0 8.9 8.4*   < > 8.7   BILIRUBIN mg/dL  --   --  0.3  --  0.7   ALK PHOS U/L  --   --  57  --  80   ALT (SGPT) U/L  --   --  16  --  9   AST (SGOT) U/L  --   --  29  --  17   GLUCOSE mg/dL 89 91 112*   < > 164*    < > = values in this  interval not displayed.     Results from last 7 days   Lab Units 03/25/23  0513 03/24/23  0641 03/23/23  0229   WBC 10*3/mm3 7.60 11.98* 14.97*   HEMOGLOBIN g/dL 8.9* 9.2* 6.3*   HEMATOCRIT % 32.9* 33.1* 23.8*   PLATELETS 10*3/mm3 143 161 179     Results from last 7 days   Lab Units 03/23/23  0431   PH, ARTERIAL pH units 7.314*   PO2 ART mm Hg 61.1*   PCO2, ARTERIAL mm Hg 87.2*   HCO3 ART mmol/L 44.3*       I reviewed the patient's new imaging including images and reports.    Medication Review:   budesonide, 0.5 mg, Nebulization, BID - RT  heparin (porcine), 5,000 Units, Subcutaneous, Q8H  ipratropium-albuterol, 3 mL, Nebulization, Q4H - RT  methylPREDNISolone sodium succinate, 40 mg, Intravenous, Q8H  pantoprazole, 40 mg, Oral, Q AM  piperacillin-tazobactam, 3.375 g, Intravenous, Q8H  sodium chloride, 10 mL, Intravenous, Q12H      phenylephrine, 0.5-3 mcg/kg/min, Last Rate: Stopped (03/23/23 0231)  sodium chloride, 75 mL/hr, Last Rate: 75 mL/hr (03/24/23 2156)        Assessment & Plan       AoC respiratory failure with hypoxia and hypercapnia    Stage 4 very severe COPD by GOLD classification    Bronchiectasis    Pulmonary nodule    RA (rheumatoid arthritis)    On chronic immunosuppression therapy    JORGITO    GERD    UTI (urinary tract infection)    Acute respiratory failure with hypoxia and hypercarbia (HCC)    Anemia    Sharmila Delarosa is a 72 year-old female with PMH of former tobacco abuse, COPD, chronic hypoxic and hypercarbic respiratory failure on 2L NC at home, bronchiectasis, LLL pulmonary nodule, rheumatoid arthritis on chronic suppression therapy with prednisone, MTX, and Plaquenil, right breast cancer s/p lumpectomy and adjuvant chemo & XRT; currently on tamoxifen (2015), and GERD that was admitted on 3/21/23 for acute on chronic respiratory failure with hypoxia and hypercarbia. She has been in the ICU since admission on Bipap/HFNC, however, she has not been improving with oxygen requirements. SLP is  following and FEES today showed no aspiration with nectar thick liquids. After the FEES and more nectar thick liquids, however, she has required an increase in FIO2 requirements.     1. For A on C respiratory failure with hypoxia and hypercapnia, Stage IV COPD, Bronchiectasis:. FEES 3/24 demonstrated that she tolerated nectar thick liquids, however, she declined after further swallowing. She required bipap for resolution of hypoxia. Has since been put on HFBC. Doing well. ABGs have shown respiratory acidosis with CO2 in 80s; will plan for Bipap at night with intermittent breaks for coughing. HFNC/NC as tolerated during the day.  Continue budesonide, duo-nebs, solu-medrol. F/u cultures which are negative to date.  2. For UTI, sepsis, JORGITO: No culture from admission, unfortunately. Continue Zosyn. BP has normalized and she is now hypertensive. Renal function has recovered and normalized.   3. For dysphagia: Patient passed FEES for soft foods and nectar thick liquids, however, her intake is poor and she fatigues quickly. Initially she did not appear to tolerate PO diet with quick desaturation while eating. Feeding tube placed for TF. Will attempt some po intake this AM to assess how she does.  4. For high BP/tachycardia: Patient takes metoprolol at home; restarted at half home dose. PRN labetalol available.   5. For RA on immunosuppressive therapy: Continue solu-medrol at current dosing for now. Continue to hold other immunosuppressive drugs. Complicates care.   6. For Anemia: Patient has a macrocytic anemia going back to September of 2022. Folate and Vitamin B12 normal. She is s/p 2 units PRBCs on 3/23/23 and she has responded appropriately. Will continue to monitor. Might need further work-up later.   7. For Pulmonary nodule: 4 x 3.2 cm nodule in RUL and multiple nodules. She will need close follow-up with this.   8. Will restart home medications as appropriate  9. AM labs    F: Nutrition consult for tube feeding  A:  PRN Tylenol  S: NA  T: Heparin and SCDs  H: HOB elevated  U: Pantoprazole  G: Glucoses have been stable  S: NA  B: Having BM's  I: PIV and external urinary catheter  D: NA    AM Labs ordered  DISPO: Continue in ICU      DINO Mcgee  03/25/23  10:22 EDT    Level of Risk High due to: risk of life and/or respiratory failure    Electronically signed by DINO Mcgee, 03/25/23, 10:22 AM EDT.    Copied text in this note has been reviewed and is accurate as of 03/25/23.

## 2023-03-26 PROBLEM — I48.91 ATRIAL FIBRILLATION WITH RVR: Status: ACTIVE | Noted: 2023-01-01

## 2023-03-26 PROBLEM — R13.10 DYSPHAGIA: Status: ACTIVE | Noted: 2023-01-01

## 2023-03-26 NOTE — SIGNIFICANT NOTE
AF w/ RVR resistant to scheduled BB and Iv metoprolol 5 mg.  SBP now in 80s w/ HR still > 150.  O/W asymptomatic.  Will initiate amiodarone protocol.  Defer to primary if cardiology consultation warranted.

## 2023-03-26 NOTE — PROGRESS NOTES
Pulmonary/Critical Care History and Physical Exam     LOS: 4 days   Patient Care Team:  Bala Gomez MD as PCP - General (Family Medicine)    Chief Complaint:    Chief Complaint   Patient presents with   • Respiratory Distress       Subjective     Interval History:  Chart reviewed. Patient remained on Bipap throughout the night. Did go into A. Fib with RVR overnight requiring Amiodarone protocol. Has since converted to NSR. Attempted PO intake trial yesterday with immediate drop in O2Sats requiring bipap to recover. She was made NPO and DHFT placed. Initial KUB showed a pneumothorax however follow up CXR showed no PTX. TF orders placed.     History taken from: patient    Past Medical History:   Diagnosis Date   • Anxiety    • Breast cancer (HCC)     Right breast    • Depression    • Drug therapy    • GERD 3/21/2023   • Hx of radiation therapy    • On chronic immunosuppression therapy 3/21/2023   • RA (rheumatoid arthritis) 3/21/2023       Past Surgical History:   Procedure Laterality Date   • BREAST BIOPSY Right 2015    X 3 BX's   • BREAST LUMPECTOMY         Family History   Problem Relation Age of Onset   • Aortic stenosis Mother    • Melanoma Father    • Ovarian cancer Sister    • Breast cancer Sister    • Rectal cancer Sister    • Breast cancer Maternal Grandmother    • Breast cancer Daughter        Social History     Socioeconomic History   • Marital status:    Tobacco Use   • Smoking status: Former     Packs/day: 1.00     Years: 40.00     Pack years: 40.00     Types: Cigarettes     Quit date:      Years since quittin.2   • Smokeless tobacco: Never   Vaping Use   • Vaping Use: Never used   Substance and Sexual Activity   • Alcohol use: Never   • Drug use: Never   • Sexual activity: Defer       Allergies   Allergen Reactions   • Cephalosporins Unknown - Low Severity       PMH/FH/SocH were reviewed by me and updates were made.     Review of Systems:    All systems were reviewed and negative  except as noted in subjective.      Objective     Vital Signs  Temp:  [97 °F (36.1 °C)-97.9 °F (36.6 °C)] 97.6 °F (36.4 °C)  Heart Rate:  [] 77  Resp:  [16-28] 17  BP: ()/() 141/63    Physical Exam:     General Appearance:    Resting in bed. Alert, cooperative, in no acute distress   Head:    Normocephalic, without obvious abnormality, atraumatic   Eyes:            Lids and lashes normal, conjunctivae and sclerae normal, no   icterus, no pallor, corneas clear, PERRLA   ENMT:   Ears appear intact with no abnormalities noted      No oral lesions, no thrush, oral mucosa moist      No adenopathy, supple, trachea midline, no thyromegaly, no   carotid bruit, no JVD       Lungs/resp:     Normal effort, symmetric chest rise, no crepitus, clear to      auscultation bilaterally, no chest wall tenderness, resonant to percussion throughout.                  Heart/CV:    Regular rhythm and normal rate, normal S1 and S2, no            murmur, no gallop, no rub, no click   Abdomen/GI:     Normal bowel sounds, no masses, no organomegaly, soft        non-tender, non-distended, no guarding, no rebound                tenderness   G/U:     Deferred   Extremities/MSK:   Right arm PICC. No clubbing, cyanosis or edema.  No deformities.    Pulses:   Pulses palpable and equal bilaterally   Skin:   No bleeding, bruising or rash   Hem/Lymph:   No cervical or supraclavicular adenopathy.    Neurologic:   Moves all extremities with no obvious focal motor deficit.  Cranial nerves 2 - 12 grossly intact            Psychiatric:  Unable to assess due to patients bipap and sleeping     Results Review:     I reviewed the patient's new clinical results.   Results from last 7 days   Lab Units 03/26/23  0811 03/25/23  0513 03/24/23  0641 03/23/23  0229 03/22/23  0616 03/21/23  5728   SODIUM mmol/L 150* 148* 147* 146*   < > 145   POTASSIUM mmol/L 3.6 4.6 4.5 4.2   < > 3.1*   CHLORIDE mmol/L 106 104 102 99   < > 88*   CO2 mmol/L 33.0*  41.0* 40.0* 44.0*   < > >50.0*   BUN mg/dL 40* 38* 34* 35*   < > 24*   CREATININE mg/dL 0.84 0.88 0.83 1.10*   < > 1.23*   CALCIUM mg/dL 7.8* 9.0 8.9 8.4*   < > 8.7   BILIRUBIN mg/dL  --   --   --  0.3  --  0.7   ALK PHOS U/L  --   --   --  57  --  80   ALT (SGPT) U/L  --   --   --  16  --  9   AST (SGOT) U/L  --   --   --  29  --  17   GLUCOSE mg/dL 212* 89 91 112*   < > 164*    < > = values in this interval not displayed.     Results from last 7 days   Lab Units 03/26/23  0811 03/25/23  0513 03/24/23  0641   WBC 10*3/mm3 4.75 7.60 11.98*   HEMOGLOBIN g/dL 7.8* 8.9* 9.2*   HEMATOCRIT % 27.4* 32.9* 33.1*   PLATELETS 10*3/mm3 126* 143 161     Results from last 7 days   Lab Units 03/23/23  0431   PH, ARTERIAL pH units 7.314*   PO2 ART mm Hg 61.1*   PCO2, ARTERIAL mm Hg 87.2*   HCO3 ART mmol/L 44.3*       I reviewed the patient's new imaging including images and reports.    Medication Review:   budesonide, 0.5 mg, Nebulization, BID - RT  dilTIAZem, 30 mg, Oral, Q6H  ipratropium-albuterol, 3 mL, Nebulization, Q4H - RT  methylPREDNISolone sodium succinate, 40 mg, Intravenous, Q8H  metoprolol succinate XL, 50 mg, Oral, Daily  pantoprazole, 40 mg, Oral, Q AM  piperacillin-tazobactam, 3.375 g, Intravenous, Q8H  sertraline, 100 mg, Oral, Daily  sodium chloride, 10 mL, Intravenous, Q12H      heparin, 12 Units/kg/hr  Pharmacy to Dose Heparin,   sodium chloride, 75 mL/hr, Last Rate: 75 mL/hr (03/26/23 0146)        Assessment & Plan       AoC respiratory failure with hypoxia and hypercapnia    Stage 4 very severe COPD by GOLD classification    Bronchiectasis    Pulmonary nodule    RA (rheumatoid arthritis)    On chronic immunosuppression therapy    JORGITO    GERD    UTI (urinary tract infection)    Acute respiratory failure with hypoxia and hypercarbia (HCC)    Anemia    Atrial fibrillation with RVR (HCC)    Dysphagia    Sharmila Delarosa is a 72 year-old female with PMH of former tobacco abuse, COPD, chronic hypoxic and  hypercarbic respiratory failure on 2L NC at home, bronchiectasis, LLL pulmonary nodule, rheumatoid arthritis on chronic suppression therapy with prednisone, MTX, and Plaquenil, right breast cancer s/p lumpectomy and adjuvant chemo & XRT; currently on tamoxifen (2015), and GERD that was admitted on 3/21/23 for acute on chronic respiratory failure with hypoxia and hypercarbia. She has been in the ICU since admission on Bipap/HFNC, however, she has not been improving with oxygen requirements. SLP is following and FEES 3/24 showed no aspiration with nectar thick liquids. Following FEES she was given a diet, but unfortunately did have immediate desaturation requiring bipap to recover. Feeding was again trialed on 3/25 with same results. Went into A. Fib with RVR on 3/25. Home metoprolol was started but did not aid in conversion. Ultimately placed on Amiodarone drip which did convert her to NSR.      AoC Respiratory Failure with Hypoxia and Hypercapnia  Stage IV COPD  Bronchiectasis  -Admitted 3/21 for AoC respiratory failure. Continues to require Bipap with sleep and HFNC during day  -Continue budesonide, duo-nebs, solu-medrol. F/u cultures which are negative to date  -Of note KUB to evaluate DHFT placement showed pneumothorax, repeat CXR did not support this. No PTX.    Dysphagia  -FEES 3/24 demonstrated that she tolerated nectar thick liquids, however, she declined after being started on diet. She required bipap for resolution of hypoxia.   -Repeat trial 3/25 again resulted in immediate desaturation requiring bipap.   -NPO; DHFT placed and started on TF    HTN  Tachycardia  A. Fib with RVRA  -Patient takes metoprolol at home; restarted yesterday  -Re-entered into A. Fib with RVR overnight and stared on Amiodarone protocol  -Will continue home dose of Metoprolol, add 30 mg PO Diltiazem, and place Cardiology consult (appreciate recommendations)  -Stop Amiodarone  -In setting of anemia will defer anticoagulation for  now  -PRN Labetalol available    UTI  Sepsis  JORGITO  -BUN/Creat elevated on admission @ 24/1.23; creat has since improved to 0.84  -4+ bacteria on UA on admission; no cultures done  -Continue Zosyn for 7 days (3/28 stop date)    Rheumatoid Arthritis  -Continue solu-medrol at current dosing for now.   -Continue to hold other immunosuppressive drugs  -Complicates care    Anemia  -Patient has a macrocytic anemia going back to September of 2022.   -Folate and Vitamin B12 normal.   -She is s/p 2 units PRBCs on 3/23/23 and she has responded appropriately.   -Will continue to monitor.   -Might need further work-up later  -Hgb 7.4 this AM; will defer anticoagulation for A.fib due to this    Pulmonary Nodule  -4x3.2 cm nodule in RUL with multiple nodules  -Will need follow up outpatient    AM labs    F: Nutrition consult for tube feeding  A: PRN Tylenol  S: NA  T: Heparin and SCDs  H: HOB elevated  U: Pantoprazole  G: Glucoses have been stable  S: NA  B: Having BM's  I: PIV and external urinary catheter  D: NA    AM Labs and CXR ordered  DISPO: Continue in ICU      DINO Mcgee  03/26/23  09:23 EDT    Level of Risk High due to: risk of life and/or respiratory failure    Racquel Avendano MSN, AGACNP-BC, APRN    Electronically signed by DINO Mcgee, 03/26/23, 9:01 AM EDT.    Copied text in this note has been reviewed and is accurate as of 03/26/23.

## 2023-03-26 NOTE — CONSULTS
Spoke to pt.  Pt denies any spiritual needs at this time.  Informed pt that she can always request a  any time when needed.

## 2023-03-26 NOTE — PLAN OF CARE
On Bipap throughout shift, started tube feed around 0200 after post pyloric KUB image. VSS stable.     Problem: Fall Injury Risk  Goal: Absence of Fall and Fall-Related Injury  Outcome: Ongoing, Progressing  Intervention: Identify and Manage Contributors  Recent Flowsheet Documentation  Taken 3/26/2023 0200 by Phillip Banegas, RN  Medication Review/Management: medications reviewed  Taken 3/25/2023 2000 by Phillip Banegas, RN  Medication Review/Management: medications reviewed  Intervention: Promote Injury-Free Environment  Recent Flowsheet Documentation  Taken 3/26/2023 0600 by Phillip Banegas, RN  Safety Promotion/Fall Prevention:   activity supervised   assistive device/personal items within reach   clutter free environment maintained   fall prevention program maintained   lighting adjusted   nonskid shoes/slippers when out of bed   room organization consistent   safety round/check completed  Taken 3/26/2023 0400 by Phillip Banegas, RN  Safety Promotion/Fall Prevention:   activity supervised   assistive device/personal items within reach   clutter free environment maintained   fall prevention program maintained   lighting adjusted   nonskid shoes/slippers when out of bed   room organization consistent   safety round/check completed  Taken 3/26/2023 0200 by Phillip Banegas, RN  Safety Promotion/Fall Prevention:   activity supervised   assistive device/personal items within reach   clutter free environment maintained   fall prevention program maintained   lighting adjusted   nonskid shoes/slippers when out of bed   room organization consistent   safety round/check completed  Taken 3/26/2023 0000 by Phillip Banegas, RN  Safety Promotion/Fall Prevention:   activity supervised   assistive device/personal items within reach   clutter free environment maintained   fall prevention program maintained   lighting adjusted   nonskid shoes/slippers when out of bed   room organization consistent    safety round/check completed  Taken 3/25/2023 2200 by Phillip Banegas, RN  Safety Promotion/Fall Prevention:   activity supervised   assistive device/personal items within reach   clutter free environment maintained   fall prevention program maintained   lighting adjusted   nonskid shoes/slippers when out of bed   room organization consistent   safety round/check completed  Taken 3/25/2023 2000 by Phillip Banegas, RN  Safety Promotion/Fall Prevention:   activity supervised   assistive device/personal items within reach   clutter free environment maintained   fall prevention program maintained   lighting adjusted   nonskid shoes/slippers when out of bed   room organization consistent   safety round/check completed     Problem: Skin Injury Risk Increased  Goal: Skin Health and Integrity  Outcome: Ongoing, Progressing  Intervention: Optimize Skin Protection  Recent Flowsheet Documentation  Taken 3/26/2023 0600 by Phillip Banegas, RN  Pressure Reduction Techniques: weight shift assistance provided  Head of Bed (HOB) Positioning: HOB at 30-45 degrees  Pressure Reduction Devices: specialty bed utilized  Skin Protection:   adhesive use limited   incontinence pads utilized   transparent dressing maintained  Taken 3/26/2023 0400 by Phillip Banegas, RN  Pressure Reduction Techniques:   frequent weight shift encouraged   weight shift assistance provided   heels elevated off bed  Head of Bed (HOB) Positioning: HOB at 30-45 degrees  Pressure Reduction Devices: specialty bed utilized  Skin Protection:   adhesive use limited   incontinence pads utilized   transparent dressing maintained  Taken 3/26/2023 0200 by Phillip Banegas, RN  Pressure Reduction Techniques: frequent weight shift encouraged  Head of Bed (HOB) Positioning: HOB at 30-45 degrees  Pressure Reduction Devices: specialty bed utilized  Taken 3/26/2023 0000 by Phillip Banegas, RN  Pressure Reduction Techniques:   frequent weight shift  encouraged   weight shift assistance provided  Head of Bed (HOB) Positioning: HOB at 30-45 degrees  Pressure Reduction Devices:   specialty bed utilized   heel offloading device utilized   positioning supports utilized  Skin Protection:   adhesive use limited   incontinence pads utilized   transparent dressing maintained  Taken 3/25/2023 2200 by Phillip Banegas, RN  Pressure Reduction Techniques: weight shift assistance provided  Head of Bed (HOB) Positioning: HOB at 30-45 degrees  Pressure Reduction Devices: specialty bed utilized  Skin Protection:   adhesive use limited   incontinence pads utilized   transparent dressing maintained  Taken 3/25/2023 2000 by Phillip Banegas, RN  Pressure Reduction Techniques:   frequent weight shift encouraged   weight shift assistance provided  Head of Bed (HOB) Positioning: HOB at 30-45 degrees  Pressure Reduction Devices: specialty bed utilized  Skin Protection:   adhesive use limited   incontinence pads utilized   transparent dressing maintained     Problem: Adjustment to Illness COPD (Chronic Obstructive Pulmonary Disease)  Goal: Optimal Chronic Illness Coping  Outcome: Ongoing, Progressing  Intervention: Support and Optimize Psychosocial Response  Recent Flowsheet Documentation  Taken 3/26/2023 0600 by Phillip Banegas, RN  Supportive Measures: active listening utilized  Taken 3/26/2023 0400 by Phillip Banegas, RN  Supportive Measures: active listening utilized  Taken 3/26/2023 0000 by Phillip Banegas, RN  Supportive Measures: active listening utilized  Taken 3/25/2023 2200 by Phillip Banegas, RN  Supportive Measures: active listening utilized  Taken 3/25/2023 2000 by Phillip Banegas, RN  Supportive Measures: active listening utilized     Problem: Functional Ability Impaired COPD (Chronic Obstructive Pulmonary Disease)  Goal: Optimal Level of Functional [place]  Outcome: Ongoing, Progressing  Intervention: Optimize Functional  Ability  Recent Flowsheet Documentation  Taken 3/26/2023 0600 by Phillip Banegas, RN  Activity Management: bedrest  Environmental Support: calm environment promoted  Taken 3/26/2023 0400 by Phillip Banegas, RN  Activity Management: bedrest  Environmental Support:   calm environment promoted   environmental consistency promoted  Taken 3/26/2023 0200 by Phillip Banegas, RN  Activity Management: bedrest  Taken 3/26/2023 0000 by Phillip Banegas, RN  Activity Management: bedrest  Environmental Support: calm environment promoted  Taken 3/25/2023 2200 by Phillip Banegas, RN  Activity Management: bedrest  Environmental Support: calm environment promoted  Taken 3/25/2023 2000 by Phillip Bangeas, RN  Activity Management: bedrest  Environmental Support: calm environment promoted     Problem: Infection COPD (Chronic Obstructive Pulmonary Disease)  Goal: Absence of Infection Signs and Symptoms  Outcome: Ongoing, Progressing     Problem: Oral Intake Inadequate COPD (Chronic Obstructive Pulmonary Disease)  Goal: Improved Nutrition Intake  Outcome: Ongoing, Progressing     Problem: Respiratory Compromise COPD (Chronic Obstructive Pulmonary Disease)  Goal: Effective Oxygenation and Ventilation  Outcome: Ongoing, Progressing  Intervention: Promote Airway Secretion Clearance  Recent Flowsheet Documentation  Taken 3/26/2023 0600 by Phillip Banegas, RN  Activity Management: bedrest  Taken 3/26/2023 0400 by Phillip Banegas, RN  Activity Management: bedrest  Taken 3/26/2023 0200 by Phillip Banegas, RN  Activity Management: bedrest  Taken 3/26/2023 0000 by Phillip Banegas, RN  Activity Management: bedrest  Taken 3/25/2023 2200 by Phillip Banegas, RN  Activity Management: bedrest  Taken 3/25/2023 2000 by Phillip Banegas, RN  Activity Management: bedrest  Intervention: Optimize Oxygenation and Ventilation  Recent Flowsheet Documentation  Taken 3/26/2023 0600 by Phillip Banegas,  RN  Head of Bed (HOB) Positioning: HOB at 30-45 degrees  Taken 3/26/2023 0400 by Phillip Banegas RN  Head of Bed (HOB) Positioning: HOB at 30-45 degrees  Taken 3/26/2023 0200 by Phillip Banegas, RN  Head of Bed (HOB) Positioning: HOB at 30-45 degrees  Taken 3/26/2023 0000 by Phillip Banegas, RN  Head of Bed (HOB) Positioning: HOB at 30-45 degrees  Taken 3/25/2023 2200 by Phillip Banegas, RN  Head of Bed (HOB) Positioning: HOB at 30-45 degrees  Taken 3/25/2023 2000 by Phillip Banegas, RN  Head of Bed (HOB) Positioning: HOB at 30-45 degrees     Problem: Adult Inpatient Plan of Care  Goal: Plan of Care Review  Outcome: Ongoing, Progressing     Problem: Palliative Care  Goal: Enhanced Quality of Life  Outcome: Ongoing, Progressing  Intervention: Optimize Psychosocial Wellbeing  Recent Flowsheet Documentation  Taken 3/26/2023 0600 by Phillip Banegas, RN  Supportive Measures: active listening utilized  Taken 3/26/2023 0400 by Phillip Banegas, RN  Supportive Measures: active listening utilized  Taken 3/26/2023 0000 by Phillip Banegas, RN  Supportive Measures: active listening utilized  Taken 3/25/2023 2200 by Phillip Banegas, RN  Supportive Measures: active listening utilized  Taken 3/25/2023 2000 by Phillip Banegas, RN  Supportive Measures: active listening utilized     Problem: Restraint, Nonviolent  Goal: Absence of Harm or Injury  Outcome: Ongoing, Progressing  Intervention: Implement Least Restrictive Safety Strategies  Recent Flowsheet Documentation  Taken 3/26/2023 0600 by Phillip Banegas, RN  Medical Device Protection: torso covered  Less Restrictive Alternative:   bed alarm in use   safety enhancements provided   calming techniques promoted  De-Escalation Techniques:   appropriate behavior reinforced   diversional activity encouraged  Diversional Activities: television  Taken 3/26/2023 0400 by Phillip Banegas, RN  Medical Device Protection: tubing  secured  Less Restrictive Alternative:   bed alarm in use   safety enhancements provided   calming techniques promoted  De-Escalation Techniques:   appropriate behavior reinforced   diversional activity encouraged   stimulation decreased  Diversional Activities: television  Taken 3/26/2023 0200 by Phillip Banegas, RN  Medical Device Protection:   IV pole/bag removed from visual field   tubing secured  Less Restrictive Alternative:   bed alarm in use   safety enhancements provided   calming techniques promoted  De-Escalation Techniques:   appropriate behavior reinforced   diversional activity encouraged   quiet time facilitated  Diversional Activities: television  Taken 3/26/2023 0113 by Phillip Banegas, RN  Medical Device Protection: tubing secured  Less Restrictive Alternative:   bed alarm in use   safety enhancements provided   calming techniques promoted  De-Escalation Techniques:   appropriate behavior reinforced   diversional activity encouraged   increased round frequency   quiet time facilitated   stimulation decreased  Diversional Activities: television  Taken 3/26/2023 0000 by Phillip Banegas, RN  Medical Device Protection:   tubing secured   torso covered  Less Restrictive Alternative:   bed alarm in use   safety enhancements provided   calming techniques promoted  De-Escalation Techniques:   appropriate behavior reinforced   quiet time facilitated   reoriented   stimulation decreased  Diversional Activities: television  Taken 3/25/2023 2200 by Phillip Banegas, RN  Medical Device Protection:   IV pole/bag removed from visual field   tubing secured  Less Restrictive Alternative:   bed alarm in use   safety enhancements provided   calming techniques promoted  De-Escalation Techniques:   appropriate behavior reinforced   diversional activity encouraged   quiet time facilitated   reoriented   stimulation decreased  Diversional Activities: television  Taken 3/25/2023 2000 by Bassam  Phillip BAER, RN  Medical Device Protection: IV pole/bag removed from visual field  Less Restrictive Alternative:   bed alarm in use   safety enhancements provided   calming techniques promoted  De-Escalation Techniques:   appropriate behavior reinforced   diversional activity encouraged   quiet time facilitated   increased round frequency   reoriented  Diversional Activities: television  Intervention: Protect Dignity, Rights, and Personal Wellbeing  Recent Flowsheet Documentation  Taken 3/26/2023 0600 by Phillip Banegas, RN  Trust Relationship/Rapport:   care explained   emotional support provided  Taken 3/26/2023 0400 by Phillip Banegas, RN  Trust Relationship/Rapport:   care explained   emotional support provided  Taken 3/26/2023 0200 by Phillip Banegas, RN  Trust Relationship/Rapport:   care explained   emotional support provided  Taken 3/26/2023 0000 by Phillip Banegas, RN  Trust Relationship/Rapport:   care explained   emotional support provided  Taken 3/25/2023 2200 by Phillip Banegas, RN  Trust Relationship/Rapport:   care explained   emotional support provided  Taken 3/25/2023 2000 by Phillip Banegas, RN  Trust Relationship/Rapport:   care explained   emotional support provided  Intervention: Protect Skin and Joint Integrity  Recent Flowsheet Documentation  Taken 3/26/2023 0600 by Phillip Banegas, RN  Body Position: position changed independently  Taken 3/26/2023 0400 by Phillip Banegas, RN  Body Position:   position changed independently   tilted   right  Taken 3/26/2023 0200 by Phillip Banegas, RN  Body Position: position changed independently  Taken 3/26/2023 0000 by Phillip Banegas, RN  Body Position:   position changed independently   tilted  Taken 3/25/2023 2200 by Phillip Banegas, RN  Body Position: position changed independently  Taken 3/25/2023 2000 by Phillip Banegas, RN  Body Position:   neutral body alignment   position changed  independently   Goal Outcome Evaluation:

## 2023-03-26 NOTE — CONSULTS
Cardiac Electrophysiology In Patient Consultation        [facility]     Consultation      PATIENT NAME:  Sharmila Delarosa    :  1950 AGE: 72 y.o.     Date of Admission:  3/21/2023  Date of Consultation:  3/26/2023      Primary Cardiologist: None    Subjective      REASON FOR CONSULT: Paroxysmal atrial fibrillation    CHIEF COMPLAINT:  Chief Complaint   Patient presents with   • Respiratory Distress     Paroxysmal atrial fibrillation  HISTORY OF PRESENT ILLNESS: 72-year-old female patient with a history of carcinoma, advanced COPD, dementia, who we are asked to see for an episode of atrial fibrillation occurring in the context of acute exacerbation of chronic COPD    Patient denies any symptoms when the episode happened.  She is never felt her heart beating erratically or briskly.    HPI review systems is significantly limited by the patient's mental status.  She does not answer questions appropriately consistently.    PAST MEDICAL HISTORY  Past Medical History:   Diagnosis Date   • Anxiety    • Breast cancer (HCC)     Right breast    • Depression    • Drug therapy    • GERD 3/21/2023   • Hx of radiation therapy    • On chronic immunosuppression therapy 3/21/2023   • RA (rheumatoid arthritis) 3/21/2023       SURGICAL HISTORY   has a past surgical history that includes Breast lumpectomy and Breast biopsy (Right, ).     SOCIAL HISTORY  Social History     Socioeconomic History   • Marital status:    Tobacco Use   • Smoking status: Former     Packs/day: 1.00     Years: 40.00     Pack years: 40.00     Types: Cigarettes     Quit date:      Years since quittin.2   • Smokeless tobacco: Never   Vaping Use   • Vaping Use: Never used   Substance and Sexual Activity   • Alcohol use: Never   • Drug use: Never   • Sexual activity: Defer       FAMILY HISTORY  family history includes Aortic stenosis in her mother; Breast cancer in her daughter, maternal  grandmother, and sister; Melanoma in her father; Ovarian cancer in her sister; Rectal cancer in her sister.     MEDICATIONS  Prior to Admission medications    Medication Sig Start Date End Date Taking? Authorizing Provider   alendronate (FOSAMAX) 70 MG tablet Take 1 tablet by mouth Every 7 (Seven) Days. 8/25/22  Yes Provider, Historical, MD   Allegra-D Allergy & Congestion  MG per 12 hr tablet Take 1 tablet by mouth Daily As Needed. OTC 7/29/22  Yes Roman Campbell MD   esomeprazole (nexIUM) 40 MG capsule Take 1 capsule by mouth Daily. OTC 7/26/22  Yes Roman Campbell MD   hydroxychloroquine (PLAQUENIL) 200 MG tablet Take 1 tablet by mouth Daily.   Yes Roman Campbell MD   methotrexate 2.5 MG tablet Take 8 tablets by mouth 1 (One) Time Per Week. Take 8 tablet by mouth once a week   Yes Roman Campbell MD   metoprolol succinate XL (TOPROL-XL) 50 MG 24 hr tablet Take 1 tablet by mouth Daily. 6/29/22  Yes Roman Campbell MD   multivitamin with minerals tablet tablet Take 1 tablet by mouth Daily. OTC   Yes Roman Campbell MD   oxybutynin (DITROPAN) 5 MG tablet Take 1 tablet by mouth 2 (Two) Times a Day. 8/25/22  Yes Provider, Historical, MD   predniSONE (DELTASONE) 10 MG tablet Take 3 tablets by mouth Daily. 9/1/22  Yes Provider, Historical, MD   sertraline (ZOLOFT) 100 MG tablet Take 1 tablet by mouth Daily. 7/26/22  Yes Roman Campbell MD   tamoxifen (NOLVADEX) 20 MG chemo tablet Take 1 tablet by mouth Every Other Day.   Yes Roman Campbell MD   tiotropium bromide monohydrate (Spiriva Respimat) 2.5 MCG/ACT aerosol solution inhaler Inhale 2 puffs Daily.   Yes Roman Campbell MD       ALLERGIES  Allergies   Allergen Reactions   • Cephalosporins Unknown - Low Severity       REVIEW OF SYSTEMS  Unable to reliably obtain a review of systems due to the patient's mental status see above.    Objective     VITAL SIGNS: /74   Pulse 76   Temp 97.7 °F (36.5 °C)  "(Axillary)   Resp 28   Ht 167.6 cm (66\")   Wt 64 kg (141 lb 1.5 oz)   SpO2 (!) 67% Comment: desaturation when turning and changing sheets (incontinent)  BMI 22.77 kg/m²      ADMIT WEIGHT:  59 kg (130 lb)  BMI: Body mass index is 22.77 kg/m².    Admission Weight: 59 kg (130 lb)     PHYSICAL EXAM  General appearance: Awake, alert, cooperative  Head: Normocephalic, without obvious abnormality, atraumatic  Eyes: Conjunctivae/corneas clear, EOMs intact  Neck: no adenopathy, no carotid bruit, no JVD and thyroid: not enlarged  Lungs: clear to auscultation bilaterally and no rhonchi or crackles\", ' symmetric  Heart: regular rate and rhythm, S1, S2 normal, no murmur, click, rub or gallop  Abdomen: Soft, nontender, bowel sounds normal,  no organomegaly  Extremities: extremities normal, atraumatic, no cyanosis or edema  Skin: Skin color, turgor normal, no rashes or lesions  Neurologic: Grossly normal     CBC:   Results from last 7 days   Lab Units 03/26/23  1225 03/26/23  0811 03/25/23  0513 03/24/23  0641   WBC 10*3/mm3  --  4.75 7.60 11.98*   HEMOGLOBIN g/dL 8.7* 7.8* 8.9* 9.2*   HEMATOCRIT % 32.1* 27.4* 32.9* 33.1*   MCV fL  --  100.0* 102.5* 99.4*   PLATELETS 10*3/mm3  --  126* 143 161         BMP:  Results from last 7 days   Lab Units 03/26/23  0811 03/25/23  0513 03/24/23  0641   POTASSIUM mmol/L 3.6 4.6 4.5   CHLORIDE mmol/L 106 104 102   CO2 mmol/L 33.0* 41.0* 40.0*   BUN mg/dL 40* 38* 34*   CREATININE mg/dL 0.84 0.88 0.83   GLUCOSE mg/dL 212* 89 91   CALCIUM mg/dL 7.8* 9.0 8.9   MAGNESIUM mg/dL 1.6 1.9 2.5*     PT/INR:     APTT:     MAG:   Results from last 7 days   Lab Units 03/26/23  0811 03/25/23  0513 03/24/23  0641   MAGNESIUM mg/dL 1.6 1.9 2.5*     D Dimer:     Troponin I     ProBNP     Lipid Panel:  No results found for: CHOL, TRIG, HDL    CURRENT MEDICATIONS:    Current Facility-Administered Medications:   •  acetaminophen (TYLENOL) 160 MG/5ML solution 650 mg, 650 mg, Oral, Q6H PRN, Justice Kraft, " MD  •  amiodarone in dextrose 5% (NEXTERONE) loading dose 150mg/100mL, 150 mg, Intravenous, Once **FOLLOWED BY** amiodarone 360 mg in 200 mL D5W infusion, 1 mg/min, Intravenous, Continuous, Toni Skinner, DO  •  budesonide (PULMICORT) nebulizer solution 0.5 mg, 0.5 mg, Nebulization, BID - RT, Justice Kraft MD, 0.5 mg at 03/26/23 0725  •  dilTIAZem (CARDIZEM) tablet 30 mg, 30 mg, Oral, Q6H, Racquel Avendano APRN, 30 mg at 03/26/23 0953  •  heparin (porcine) 5000 UNIT/ML injection 5,000 Units, 5,000 Units, Subcutaneous, Q8H, Racquel Avendano APRN, 5,000 Units at 03/26/23 1351  •  ipratropium-albuterol (DUO-NEB) nebulizer solution 3 mL, 3 mL, Nebulization, Q4H - RT, Carl Verma MD, 3 mL at 03/26/23 1044  •  labetalol (NORMODYNE,TRANDATE) injection 20 mg, 20 mg, Intravenous, Q4H PRN, Gifty Suero APRN, 20 mg at 03/25/23 1326  •  Magnesium Standard Dose Replacement - Follow Nurse / BPA Driven Protocol, , Does not apply, PRN, rIene Gomez, DNP, APRN  •  magnesium sulfate 4g/100mL (PREMIX) infusion, 4 g, Intravenous, Once, Racquel Avendano APRN, 4 g at 03/26/23 1225  •  methylPREDNISolone sodium succinate (SOLU-Medrol) injection 40 mg, 40 mg, Intravenous, Q8H, Carl Verma MD, 40 mg at 03/26/23 1352  •  metoprolol succinate XL (TOPROL-XL) 24 hr tablet 50 mg, 50 mg, Oral, Daily, Racquel Avendano APRN, 50 mg at 03/26/23 0953  •  pantoprazole (PROTONIX) EC tablet 40 mg, 40 mg, Oral, Q AM, Charli Cortez MD, 40 mg at 03/26/23 0533  •  piperacillin-tazobactam (ZOSYN) 3.375 g in iso-osmotic dextrose 50 ml (premix), 3.375 g, Intravenous, Q8H, Irene Gomez, DNP, APRN, Last Rate: 0 mL/hr at 03/22/23 1012, 3.375 g at 03/26/23 1352  •  potassium chloride (KLOR-CON) packet 40 mEq, 40 mEq, Oral, Q4H, Racquel Avendano, APRN, 40 mEq at 03/26/23 1225  •  Potassium Replacement - Follow Nurse / BPA Driven Protocol, , Does not apply, PRN, Júnior Flores MD  •  sertraline (ZOLOFT) tablet 100  mg, 100 mg, Oral, Daily, Racquel Avnedano, APRN, 100 mg at 03/26/23 0953  •  sodium chloride 0.9 % flush 10 mL, 10 mL, Intravenous, PRN, Irene Gomez, DNP, APRN  •  sodium chloride 0.9 % flush 10 mL, 10 mL, Intravenous, Q12H, Justice Kraft MD, 10 mL at 03/26/23 0953  •  sodium chloride 0.9 % infusion, 75 mL/hr, Intravenous, Continuous, Justice Kraft MD, Last Rate: 75 mL/hr at 03/26/23 0146, 75 mL/hr at 03/26/23 0146  CONTINUOUS INFUSIONS:  amiodarone, 1 mg/min  sodium chloride, 75 mL/hr, Last Rate: 75 mL/hr (03/26/23 0146)          EKG: Reviewed  ECHO: Reviewed  STRESS: None  CATH: None  CXR: Reviewed  TELEMETRY: Sinus rhythm presently    Assessment & Plan        Diagnosis Plan   1.   Paroxysmal atrial fibrillation  in the context of advanced lung disease.    In the context of dementia.    Patient now DNR/DNI.    I think that amiodarone will be a reasonable option in terms of palliation of symptoms of rapid A-fib.  We will continue to load her intravenously for several days.  Eventual transition over to oral amiodarone after probably 3 to 4 days of intravenous amiodarone.    Would not favor anticoagulation presently due to anemia and potential GI bleeding here and therefore not favorable risk-benefit profile for anticoagulation.  Can be revisited over time in follow-up.    Palliative care certainly seems appropriate.  Glad to see that they are involved in care.    Good discussion with family members at bedside.     Body mass index is 22.77 kg/m².      I spent 45 minutes in consultation with this patient which included more than 65% of this time in direct face-to-face counseling, physical examination and discussion of my assessment and findings and this shared decision making with the patient.  The remainder of the time not spent face-to-face was performing one, some or all of the following actions: preparing to see the patient (e.g. reviewing tests, prior clinicians' notes, etc), ordering medications, tests  or procedures, coordination of care, discussion of the plan with other healthcare providers, documenting clinical information in epic as well as independently interpreting results and communication of these results to the patient family and/or caregiver(s).  Please note that this explicitly excludes time spent on other separate billable services such as performing procedures or test interpretation, when applicable.      Toni Skinner DO, FACC, RS  Cardiac Electrophysiologist  [facility] / [facility]      Dictated Utilizing Dragon Dictation

## 2023-03-27 NOTE — PROGRESS NOTES
Daily chart review performed.  Patient resting at time of rounding.  O2 sat 100% on 50% HFNC, home baseline is 2L.  Patient restrained this date.  No other questions or concerns at this time.  NN will continue to follow.         Per current GOLD Standards, please consider:  LAMA in place (Spiriva Respimat), No LABA/ICS in place, Outpatient PFT, Rehab as appropriate, Palliative Care consult,  Annual LDCT per current screening guidelines (age 50-80 years old, smoking history of 20 pack years or more or has quit within past 15 years)      Patient has been scheduled for a hospital follow up with [facility] for 4/19/2023 @ 2:30 pm with DINO Mendiola.

## 2023-03-27 NOTE — CASE MANAGEMENT/SOCIAL WORK
Continued Stay Note  [facility]     Patient Name: Sharmila Delarosa  MRN: [id number]  Today's Date: 3/27/2023    Admit Date: 3/21/2023    Plan: Ongoing   Discharge Plan     Row Name 03/27/23 1557       Plan    Plan Ongoing      Plan Comments Ms. Delarosa remains dependent on high flow nasal cannula and the bipap. The palliative care team spoke with family today, they are deciding between inpatient Hospice and continuing treatment. Case management will continue to follow Ms. Delarosa's progress.               Discharge Codes    No documentation.               Expected Discharge Date and Time     Expected Discharge Date Expected Discharge Time    Mar 31, 2023             Iliana Reid RN

## 2023-03-27 NOTE — PROGRESS NOTES
"Palliative Care Daily Progress Note     C/C: Patient complaining of SOA.     S: Medical record reviewed. Follow up visit for GOC in the context of complex medical decision making. Events noted. Currently on HFNC at 60% FiO2. Patient alert but has difficulty naming family member in room, able to state she is in the hospital. Daughter at bedside and asking how patient is doing and when \"end of life\" decisions need to be made. Discussed patient's acute on chronic nature of disease progression. Discussed worsening respiratory status and a palliative approach would be appropriate if patient/family elect. Amiodarone started for A-fib, now in NSR. Speech evaluation to be repeated today as TF in place.     ROS: +SOA, currently on BiPAP. +cough. +fatigue, due to SOA. +debility. +diarrhea, FMS in place. +pain, due to skin excoriation due to diarrhea. Denies anxiety, N/V.      O: Code Status:   Code Status and Medical Interventions:   Ordered at: 03/24/23 1156     Medical Intervention Limits:    NO intubation (DNI)     Level Of Support Discussed With:    Patient     Code Status (Patient has no pulse and is not breathing):    No CPR (Do Not Attempt to Resuscitate)     Medical Interventions (Patient has pulse or is breathing):    Limited Support     Release to patient:    Routine Release      Advanced Directives: Advance Directive Status: Patient has advance directive, copy in chart   Goals of Care: Ongoing.   Palliative Performance Scale Score: 30%     /56   Pulse 68   Temp 98 °F (36.7 °C) (Axillary)   Resp 18   Ht 167.6 cm (66\")   Wt 64 kg (141 lb 1.5 oz)   SpO2 100%   BMI 22.77 kg/m²     Intake/Output Summary (Last 24 hours) at 3/27/2023 1327  Last data filed at 3/27/2023 0800  Gross per 24 hour   Intake 3681.5 ml   Output 200 ml   Net 3481.5 ml       PE:  General Appearance:    Patient laying in bed, A/C ill appearing, frail, follows commands, opens eyes on command, answers some questions appropriately, NAD "   HEENT:    NC/AT, EOMI, anicteric, MM dry, HFNC in place, keofeed in place   Neck:   supple, trachea midline, no JVD   Lungs:      diminished in bases; WOB increased with speaking and at rest; RR 22-24 on exam, HFNC 60% FiO2    Heart:    RRR, normal S1 and S2, no M/R/G, HR 68 on monitor   Abdomen:     Normal bowel sounds, soft, nontender, nondistended   G/U:   Deferred   MSK/Extremities:   Wasting, no edema   Pulses:   Pulses palpable and equal bilaterally   Skin:   Warm, dry   Neurologic:   A/Ox2, cooperative, answers some questions appropriately   Psych:   Calm, appropriate       Meds: Reviewed and changes noted    Labs:   Results from last 7 days   Lab Units 03/27/23 0423   WBC 10*3/mm3 9.27   HEMOGLOBIN g/dL 8.1*   HEMATOCRIT % 30.0*   PLATELETS 10*3/mm3 132*     Results from last 7 days   Lab Units 03/27/23 0423   SODIUM mmol/L 142   POTASSIUM mmol/L 4.5   CHLORIDE mmol/L 100   CO2 mmol/L 33.0*   BUN mg/dL 42*   CREATININE mg/dL 1.02*   GLUCOSE mg/dL 416*   CALCIUM mg/dL 7.9*     Results from last 7 days   Lab Units 03/27/23 0423   SODIUM mmol/L 142   POTASSIUM mmol/L 4.5   CHLORIDE mmol/L 100   CO2 mmol/L 33.0*   BUN mg/dL 42*   CREATININE mg/dL 1.02*   CALCIUM mg/dL 7.9*   BILIRUBIN mg/dL 0.2   ALK PHOS U/L 43   ALT (SGPT) U/L 15   AST (SGOT) U/L 12   GLUCOSE mg/dL 416*     Imaging Results (Last 72 Hours)     Procedure Component Value Units Date/Time    XR Chest 1 View [[id number]] Collected: 03/27/23 0827     Updated: 03/27/23 0832    Narrative:      XR CHEST 1 VW    Date of Exam: 3/27/2023 5:10 AM EDT    Indication: respiratory failure.    Comparison: March 26, 2023    Findings:  There is a feeding tube in the stomach. There is a right-sided PICC line with tip in superior vena cava. There are small bilateral pleural effusions. There is bilateral basilar atelectasis.      Impression:      Impression:  Bilateral basilar atelectasis. Small bilateral pleural effusions. Stable chest.    Electronically Signed:  Carltonbunny Ralph    3/27/2023 8:29 AM EDT    Workstation ID: [id number]    XR Chest 1 View [[id number]] Collected: 03/26/23 0126     Updated: 03/26/23 0331    Narrative:      EXAMINATION: XR CHEST 1 VW    DATE: 3/26/2023 2:23 AM    INDICATION:  Possible right pneumothorax on radiograph of the abdomen;    COMPARISON:  Chest radiograph March 25, 2023 and abdominal radiograph March 26, 2023    FINDINGS:  Right arm PICC unchanged. Enteric tube tip past GE junction and off image.    Small bilateral pleural effusions redemonstrated.    Diffuse pulmonary vascular interstitial prominence unchanged.    Cardiomediastinal silhouette  unchanged.    No evidence of pneumothorax.          Impression:        1.  No evidence of pneumothorax. Finding on abdominal radiograph was likely artifactual.  2.  No significant change since chest radiograph yesterday.          Electronically signed by:  Davi Valentine M.D.    3/26/2023 1:30 AM Mountain Time    XR Abdomen KUB [[id number]] Collected: 03/25/23 2325     Updated: 03/26/23 0155    Narrative:      Supine abdomen.    DATE: 3/26/2023.    COMPARISON: 3/25/2023.    CLINICAL HISTORY: NG tube adjustment.      Impression:        NG/Dobbhoff tube projects overlying the mid abdomen and possibly within the horizontal portion of the duodenum and near the ligament of Treitz given the course.    There appears to be a pneumothorax on the right at the visualized portion of the lung bases. This was not seen on the prior study. Recommend dedicated chest imaging for further characterization.    Patchy parenchymal changes are otherwise seen at the visualized lung bases. These are unchanged.    CRITICAL RESULTS NOTIFICATION: Results were called to nurse practitioner Dane Bell at 11:53 PM [place] standard time on 3/25/2023..    Electronically signed by:  Otilio Reddy D.O.    3/25/2023 11:54 PM Mountain Time    XR Abdomen KUB [[id number]] Collected: 03/25/23 1931     Updated: 03/25/23 2133    Narrative:       EXAMINATION: XR ABDOMEN KUB      DATE OF EXAM: 3/25/2023 8:48 PM    HISTORY: Keofeed adjusted    COMPARISON: 2:08 PM same day.        Impression:        1.  Weighted enteric tube retracted with tip in the distal stomach and directed antegrade.  2.  No other interval change.     Electronically signed by:  Davi Valentine M.D.    3/25/2023 7:32 PM Mountain Time    XR Abdomen KUB [[id number]] Collected: 03/25/23 1438     Updated: 03/25/23 1443    Narrative:      XR ABDOMEN KUB    Date of Exam: 3/25/2023 2:10 PM EDT    Indication: keofeed placement.    Comparison: March 24, 2023    Findings:  Enteric tube is noted with its tip by location in probably the distal duodenum. Exact location however difficult to confirm without contrast. There are bibasilar effusions as well as visualized of the lower lungs. There is some atelectasis or infiltrate   at the right base.      Impression:      Impression:  1.Enteric tube by location could be in the distal duodenum. Without contrast one cannot definitely confirm location.  2.Bibasilar effusions and what looks like infiltrate right base.    Electronically Signed: Dinh Walters    3/25/2023 2:40 PM EDT    Workstation ID: [id number]    XR Chest 1 View [[id number]] Collected: 03/25/23 0909     Updated: 03/25/23 0915    Narrative:      XR CHEST 1 VW    Date of Exam: 3/25/2023 5:18 AM EDT    Indication: Respiratory failure.    Comparison: 3/24/2023    Findings:  Feeding tube remains in the stomach. PICC line remains in the mid SVC. Heart shadow is normal in size. Diffuse and extensive pulmonary interstitial disease shows minimal interval change from yesterday's study, perhaps a little increased in the left lung   base, generally stable elsewhere. The earlier 3/21/2023 chest CT scan showed right upper and middle lobe pneumonia, and relatively mild changes elsewhere. There are minimal effusions. No pneumothorax is identified.      Impression:      Impression:  Largely stable pulmonary  interstitial disease pattern, perhaps minimally increased in the left lung base. No significant new chest disease compared to 3/24/2023 exam.    Electronically Signed: Nathanael Gravesd    3/25/2023 9:12 AM EDT    Workstation ID: [id number]    XR Chest 1 View [[id number]] Collected: 03/24/23 2046     Updated: 03/24/23 2100    Narrative:      XR CHEST 1 VW, XR ABDOMEN KUB    Date of Exam: 3/24/2023 8:10 PM EDT    Indication: Respiratory failure.    Comparison: Chest radiograph dated 3/23/2023 and 3/21/2023    Findings:  Chest: Right upper lobe opacity has improved with some residual remaining. There are layering bilateral pleural effusions right greater than left. Extensive underlying emphysema is suspected. Pulmonary vascularity is unremarkable. Mild cardiomegaly is   unchanged. A right-sided PICC is present with the tip in the lower SVC. Feeding tube courses below the diaphragm. The trachea is midline. There are senescent changes in the shoulders. No acute bony abnormality.     KUB: The feeding tube courses below the diaphragm with the tip in the region of the distal stomach near the pylorus. Visualized bowel gas pattern is nonspecific. No suspicious gas collections.      Impression:      Impression:    1. Further improvement in the right upper lobe opacity with some residual remaining favored to represent improving pneumonia. Continued follow-up is needed to ensure complete radiographic resolution.  2. Small bilateral pleural effusions are suspected. Support lines and tubes are unchanged.    3. Feeding tube tip is in the region of the distal stomach near the pylorus.    Electronically Signed: Ray Villasenor    3/24/2023 8:57 PM EDT    Workstation ID: [id number]    XR Abdomen KUB [[id number]] Collected: 03/24/23 2046     Updated: 03/24/23 2100    Narrative:      XR CHEST 1 VW, XR ABDOMEN KUB    Date of Exam: 3/24/2023 8:10 PM EDT    Indication: Respiratory failure.    Comparison: Chest radiograph dated 3/23/2023 and  3/21/2023    Findings:  Chest: Right upper lobe opacity has improved with some residual remaining. There are layering bilateral pleural effusions right greater than left. Extensive underlying emphysema is suspected. Pulmonary vascularity is unremarkable. Mild cardiomegaly is   unchanged. A right-sided PICC is present with the tip in the lower SVC. Feeding tube courses below the diaphragm. The trachea is midline. There are senescent changes in the shoulders. No acute bony abnormality.     KUB: The feeding tube courses below the diaphragm with the tip in the region of the distal stomach near the pylorus. Visualized bowel gas pattern is nonspecific. No suspicious gas collections.      Impression:      Impression:    1. Further improvement in the right upper lobe opacity with some residual remaining favored to represent improving pneumonia. Continued follow-up is needed to ensure complete radiographic resolution.  2. Small bilateral pleural effusions are suspected. Support lines and tubes are unchanged.    3. Feeding tube tip is in the region of the distal stomach near the pylorus.    Electronically Signed: Ray Villasenor    3/24/2023 8:57 PM EDT    Workstation ID: [id number]                Diagnostics: Reviewed    A:   AoC respiratory failure with hypoxia and hypercapnia    Stage 4 very severe COPD by GOLD classification    Bronchiectasis    Pulmonary nodule    RA (rheumatoid arthritis)    On chronic immunosuppression therapy    JORGITO    GERD    UTI (urinary tract infection)    Acute respiratory failure with hypoxia and hypercarbia (HCC)    Anemia    Atrial fibrillation with RVR (HCC)    Dysphagia     72 y.o. female with COPD, bronchiectasis, JORGITO, GERD, PNA, UTI.      S/S:   1. Dyspnea -currently on BiPAP/HFNC  -continue nebs, steroids, ABX  -recommend Morphine 5mg PO q 3 hours prn dyspnea     2. Dysphagia -currently on TF, speech re-eval pending  -son reports would not pursue PEG     3. Debility -recommend PT/OT evaluation  if respiratory status allows     4. GOC -DNR/DNI -per discussion with patient and phone call with son  -patient with some confusion at time of visit  -called and spoke to Christian leung, at 2:25pm, discussed patient's current respiratory status and condition, discussed comfort focused plan of care    P: Patient confused at time of visit. Daughter at bedside, discussed patient's condition and that palliative approach is appropriate if patient's goals are comfort focused. Called and spoke to sonChristian, on the phone to discuss patient's condition. Discussed that patient's condition is hospice appropriate and discussed comfort measures. He is considering continued Full Treatment versus comfort focused plan. All questions and concerns addressed.   Palliative Care Team will continue to follow patient. Please do not hesitate to contact us regarding further sx mgmt or GOC needs.  Makayla Roper, APRN  3/27/2023  Time spent: 40 minutes

## 2023-03-27 NOTE — THERAPY RE-EVALUATION
Acute Care - Speech Language Pathology   Swallow Re-Evaluation [facility]   Clinical Swallow Evaluation         Patient Name: Sharmila Delarosa  : 1950  MRN: [id number]  Today's Date: 3/27/2023               Admit Date: 3/21/2023    Visit Dx:     ICD-10-CM ICD-9-CM   1. Acute respiratory failure with hypoxia and hypercarbia (HCC)  J96.01 518.81    J96.02    2. Lethargy  R53.83 780.79   3. Hypoxia  R09.02 799.02   4. Stage 4 very severe COPD by GOLD classification  J44.9 496   5. AoC respiratory failure with hypoxia and hypercapnia  J96.21 518.84    J96.22 786.09     799.02   6. Oropharyngeal dysphagia  R13.12 787.22     Patient Active Problem List   Diagnosis   • Stage 4 very severe COPD by GOLD classification   • Bronchiectasis   • Chronic respiratory failure with hypoxia (HCC)   • Pulmonary nodule   • Former smoker   • AoC respiratory failure with hypoxia and hypercapnia   • RA (rheumatoid arthritis)   • On chronic immunosuppression therapy   • JORGITO   • GERD   • UTI (urinary tract infection)   • Acute respiratory failure with hypoxia and hypercarbia (HCC)   • Anemia   • Atrial fibrillation with RVR (HCC)   • Dysphagia     Past Medical History:   Diagnosis Date   • Anxiety    • Breast cancer (HCC) 2015    Right breast    • Depression    • Drug therapy    • GERD 3/21/2023   • Hx of radiation therapy    • On chronic immunosuppression therapy 3/21/2023   • RA (rheumatoid arthritis) 3/21/2023     Past Surgical History:   Procedure Laterality Date   • BREAST BIOPSY Right 2015    X 3 BX's   • BREAST LUMPECTOMY         SLP Recommendation and Plan  SLP Swallowing Diagnosis: functional oral phase, R/O pharyngeal dysphagia, suspected esophageal dysphagia (23 1300)  SLP Diet Recommendation: NPO, temporary alternate methods of nutrition/hydration, other (see comments) (ok for 4-5 ice chips, after oral care) (23 1300)     SLP Rec. for Method of Medication Administration: meds via alternate route (23  1300)     Monitor for Signs of Aspiration: notify SLP if any concerns (03/27/23 1300)  Recommended Diagnostics: FEES (03/27/23 1300)  Swallow Criteria for Skilled Therapeutic Interventions Met: demonstrates skilled criteria (03/27/23 1300)  Anticipated Discharge Disposition (SLP): inpatient rehabilitation facility, anticipate therapy at next level of care (03/27/23 1300)  Rehab Potential/Prognosis, Swallowing: adequate, monitor progress closely (03/27/23 1300)     Predicted Duration Therapy Intervention (Days): until discharge (03/27/23 1300)                                               SWALLOW EVALUATION (last 72 hours)     SLP Adult Swallow Evaluation     Row Name 03/27/23 1300                   Rehab Evaluation    Document Type re-evaluation  -        Subjective Information no complaints  -        Patient Observations alert;cooperative;agree to therapy  -        Patient/Family/Caregiver Comments/Observations dtr present  -        Patient Effort adequate  -        Symptoms Noted During/After Treatment none  -           General Information    Patient Profile Reviewed yes  -        Pertinent History Of Current Problem see prior evaluation, patient currently NPO after desatting with single sip of liquids on 2 separate occassions.  -        Current Method of Nutrition NPO;nasogastric feedings  -        Precautions/Limitations, Vision WFL  -        Precautions/Limitations, Hearing WFL  -        Prior Level of Function-Communication unknown  -        Prior Level of Function-Swallowing unknown  -        Plans/Goals Discussed with patient and family;agreed upon  -        Barriers to Rehab medically complex  -        Patient's Goals for Discharge return to PO diet  -           Pain    Additional Documentation Pain Scale: FACES Pre/Post-Treatment (Group)  -           Pain Scale: FACES Pre/Post-Treatment    Pain: FACES Scale, Pretreatment 0-->no hurt  -        Posttreatment Pain Rating  0-->no hurt  -CH           Oral Motor Structure and Function    Dentition Assessment natural, present and adequate  -CH        Secretion Management WNL/WFL  -CH        Mucosal Quality dry  -CH        Volitional Swallow delayed  -CH        Volitional Cough weak  -CH           Oral Musculature and Cranial Nerve Assessment    Oral Motor General Assessment generalized oral motor weakness;oral labial or buccal impairment  -CH        Oral Labial or Buccal Impairment, Detail, Cranial Nerve VII (Facial): left labial droop  -CH           General Eating/Swallowing Observations    Respiratory Support Currently in Use high-flow nasal cannula  -CH        O2 Liters other (see comments)  60  -CH        Eating/Swallowing Skills fed by SLP  -CH        Positioning During Eating upright 90 degree;upright in bed  -CH        Utensils Used spoon;cup;straw  -CH        Consistencies Trialed ice chips;thin liquids;nectar/syrup-thick liquids;pureed  -CH        Pre SpO2 (%) 99  -CH        Post SpO2 (%) 100  -CH           Respiratory    Respiratory Status WFL;other (see comments)  HFNC  -CH           Clinical Swallow Eval    Oral Prep Phase WFL  -CH        Pharyngeal Phase no overt signs/symptoms of pharyngeal impairment  -CH        Clinical Swallow Evaluation Summary No overt clinical s/s of aspiration with any consistency, however, given patients current respiratory status and recent hx of desatting with PO intake. Recommend continued NPO with temporary alternate means of nutrition. FEES to further evaluate. Patient ok for 4-5 ice chips per hour until FEES.  -           SLP Evaluation Clinical Impression    SLP Swallowing Diagnosis functional oral phase;R/O pharyngeal dysphagia;suspected esophageal dysphagia  -        Functional Impact risk of aspiration/pneumonia  -        Rehab Potential/Prognosis, Swallowing adequate, monitor progress closely  -        Swallow Criteria for Skilled Therapeutic Interventions Met demonstrates skilled  criteria  -           Recommendations    Predicted Duration Therapy Intervention (Days) until discharge  -        SLP Diet Recommendation NPO;temporary alternate methods of nutrition/hydration;other (see comments)  ok for 4-5 ice chips, after oral care  -        Recommended Diagnostics FEES  -        Oral Care Recommendations Oral Care BID/PRN  -        SLP Rec. for Method of Medication Administration meds via alternate route  -        Monitor for Signs of Aspiration notify SLP if any concerns  -        Anticipated Discharge Disposition (SLP) inpatient rehabilitation facility;anticipate therapy at next level of care  -              User Key  (r) = Recorded By, (t) = Taken By, (c) = Cosigned By    Initials Name Effective Dates    Jenny Rivera MS CCC-SLP 06/16/21 -                 EDUCATION  The patient has been educated in the following areas:   Dysphagia (Swallowing Impairment) Oral Care/Hydration NPO rationale.              Time Calculation:    Time Calculation- SLP     Row Name 03/27/23 1614             Time Calculation- SLP    SLP Start Time 1300  -CH      SLP Received On 03/27/23  -         Untimed Charges    SLP Eval/Re-eval  ST Eval Oral Pharyng Swallow - 66251  -CH      03437-XS Eval Oral Pharyng Swallow Minutes 53  -CH         Total Minutes    Untimed Charges Total Minutes 53  -CH       Total Minutes 53  -CH            User Key  (r) = Recorded By, (t) = Taken By, (c) = Cosigned By    Initials Name Provider Type    Jenny Rivera MS CCC-SLP Speech and Language Pathologist                Therapy Charges for Today     Code Description Service Date Service Provider Modifiers Qty    [id number] HC ST EVAL ORAL PHARYNG SWALLOW 4 3/27/2023 Jenny Elizondo MS CCC-SLP GN 1               Jenny Elizondo MS CCC-SLP  3/27/2023

## 2023-03-27 NOTE — PROGRESS NOTES
[facility] Cardiology  Inpatient Progress Note      Chief Complaint/Reason for consult: PAF         Subjective  Breathing feels better today, still having some wet cough, afebrile overnight    Review of Systems:   Negative for chest pain, palpitations, fevers, chills, abdominal pain, nausea, vomiting       Vital Sign Min/Max for last 24 hours  Temp  Min: 97.2 °F (36.2 °C)  Max: 98.2 °F (36.8 °C)   BP  Min: 86/45  Max: 125/42   Pulse  Min: 57  Max: 131   Resp  Min: 12  Max: 28   SpO2  Min: 67 %  Max: 100 %   Flow (L/min)  Min: 50  Max: 50      Intake/Output Summary (Last 24 hours) at 3/27/2023 1155  Last data filed at 3/27/2023 0600  Gross per 24 hour   Intake 2405.9 ml   Output 200 ml   Net 2205.9 ml           Gen: well developed, white female, lying in bed, comfortable.  HEENT: MMM, sclerae anicteric, conjunctivae normal  CV: regular rate, regular rhythm, III/VI systolic murmur at base, normal S1, S2. 2+ radial pulses  Pulm: Nasal cannula, normal work of breathing, scattered rhonchi  Abd: soft, nontender, nondistended,  positive bowel sounds  Ext: normal bulk for age, normal tone, no dependent edema  Neuro: Awake and alert, face symmetrical, upper extremities in restraints     Tele:  Afib morning of 3/26, SR since with no sustained arrhythmia seen    Results Review (reviewed the patient's recent labs in the electronic medical record):     EKG:  3/27 - NSR, normal ECG, normal QT/QTc     Radiology:   XR Chest 1 View    Result Date: 3/27/2023  Impression: Bilateral basilar atelectasis. Small bilateral pleural effusions. Stable chest. Electronically Signed: Carlton Ralph  3/27/2023 8:29 AM EDT  Workstation ID: [id number]    XR Chest 1 View    Result Date: 3/26/2023  1.  No evidence of pneumothorax. Finding on abdominal radiograph was likely artifactual. 2.  No significant change since chest radiograph yesterday. Electronically signed by:  Davi Valentine M.D.  3/26/2023 1:30 AM Mountain Time    XR Abdomen  KUB    Result Date: 3/26/2023  NG/Dobbhoff tube projects overlying the mid abdomen and possibly within the horizontal portion of the duodenum and near the ligament of Treitz given the course. There appears to be a pneumothorax on the right at the visualized portion of the lung bases. This was not seen on the prior study. Recommend dedicated chest imaging for further characterization. Patchy parenchymal changes are otherwise seen at the visualized lung bases. These are unchanged. CRITICAL RESULTS NOTIFICATION: Results were called to nurse practitioner Dane Bell at 11:53 PM [place]. standard time on 3/25/2023.. Electronically signed by:  Otilio Reddy D.O.  3/25/2023 11:54 PM Mountain Time    XR Abdomen KUB    Result Date: 3/25/2023  1.  Weighted enteric tube retracted with tip in the distal stomach and directed antegrade. 2.  No other interval change. Electronically signed by:  Davi Valentine M.D.  3/25/2023 7:32 PM Mountain Time    XR Abdomen KUB    Result Date: 3/25/2023  Impression: 1.Enteric tube by location could be in the distal duodenum. Without contrast one cannot definitely confirm location. 2.Bibasilar effusions and what looks like infiltrate right base. Electronically Signed: Dinh Walters  3/25/2023 2:40 PM EDT  Workstation ID: [id number]    3/23/23 - Transthoracic Echo Complete   •  Left ventricular ejection fraction appears to be 51 - 55%.  •  Left ventricular diastolic function is consistent with (grade I) impaired relaxation.  •  Moderate tricuspid valve regurgitation is present. Estimated right ventricular systolic pressure from tricuspid regurgitation is markedly elevated (>55 mmHg).  •  The hepatic veins are dilated consistent with elevated right atrial pressure.  •  There is moderate thickening of the aortic valve. The aortic valve appears trileaflet. Trace aortic valve regurgitation is present. Mild aortic valve stenosis is present.       Lab Results   Component Value Date    WBC 9.27 03/27/2023     HGB 8.1 (L) 03/27/2023    HCT 30.0 (L) 03/27/2023    .8 (H) 03/27/2023     (L) 03/27/2023     Lab Results   Component Value Date    GLUCOSE 416 (C) 03/27/2023    CALCIUM 7.9 (L) 03/27/2023     03/27/2023    K 4.5 03/27/2023    CO2 33.0 (H) 03/27/2023     03/27/2023    BUN 42 (H) 03/27/2023    CREATININE 1.02 (H) 03/27/2023    BCR 41.2 (H) 03/27/2023    ANIONGAP 9.0 03/27/2023       Assessment     Paroxysmal atrial fibrillation, currently maintaining sinus rhythm   - amiodarone loading with IV, can transition to oral 200 mg twice daily after current infusion is completed   - continue metoprolol   - no AC with anemia    MAMTA Gomez MD  3/27/2023  11:55 EDT

## 2023-03-27 NOTE — PROGRESS NOTES
Pulmonary/Critical Care History and Physical Exam     LOS: 5 days   Patient Care Team:  Bala Gomez MD as PCP - General (Family Medicine)    Chief Complaint:    Chief Complaint   Patient presents with   • Respiratory Distress       Subjective     Interval History:  Chart reviewed. Patient continues to require bipap and night and HFNC during day. She is more awake today and notes she feels better. She has no complaints    History taken from: patient    Past Medical History:   Diagnosis Date   • Anxiety    • Breast cancer (HCC)     Right breast    • Depression    • Drug therapy    • GERD 3/21/2023   • Hx of radiation therapy    • On chronic immunosuppression therapy 3/21/2023   • RA (rheumatoid arthritis) 3/21/2023       Past Surgical History:   Procedure Laterality Date   • BREAST BIOPSY Right 2015    X 3 BX's   • BREAST LUMPECTOMY         Family History   Problem Relation Age of Onset   • Aortic stenosis Mother    • Melanoma Father    • Ovarian cancer Sister    • Breast cancer Sister    • Rectal cancer Sister    • Breast cancer Maternal Grandmother    • Breast cancer Daughter        Social History     Socioeconomic History   • Marital status:    Tobacco Use   • Smoking status: Former     Packs/day: 1.00     Years: 40.00     Pack years: 40.00     Types: Cigarettes     Quit date:      Years since quittin.2   • Smokeless tobacco: Never   Vaping Use   • Vaping Use: Never used   Substance and Sexual Activity   • Alcohol use: Never   • Drug use: Never   • Sexual activity: Defer       Allergies   Allergen Reactions   • Cephalosporins Unknown - Low Severity       PMH/FH/SocH were reviewed by me and updates were made.     Review of Systems:    All systems were reviewed and negative except as noted in subjective.      Objective     Vital Signs  Temp:  [97.2 °F (36.2 °C)-98.2 °F (36.8 °C)] 98 °F (36.7 °C)  Heart Rate:  [] 61  Resp:  [12-28] 16  BP: ()/(42-96) 111/50    Physical  Exam:     General Appearance:    Resting in bed. Alert, cooperative, in no acute distress   Head:    Normocephalic, without obvious abnormality, atraumatic   Eyes:            Lids and lashes normal, conjunctivae and sclerae normal, no   icterus, no pallor, corneas clear, PERRLA   ENMT:   Ears appear intact with no abnormalities noted      No oral lesions, no thrush, oral mucosa moist      No adenopathy, supple, trachea midline, no thyromegaly, no   carotid bruit, no JVD       Lungs/resp:     Normal effort, symmetric chest rise, no crepitus, clear to      auscultation bilaterally, no chest wall tenderness, resonant to percussion throughout.                  Heart/CV:    Regular rhythm and normal rate, normal S1 and S2, no            murmur, no gallop, no rub, no click   Abdomen/GI:     Normal bowel sounds, no masses, no organomegaly, soft        non-tender, non-distended, no guarding, no rebound                tenderness   G/U:     Deferred   Extremities/MSK:   Right arm PICC. No clubbing, cyanosis or edema.  No deformities.    Pulses:   Pulses palpable and equal bilaterally   Skin:   No bleeding, bruising or rash   Hem/Lymph:   No cervical or supraclavicular adenopathy.    Neurologic:   EMV 14, Moves all extremities with no obvious focal motor deficit.  Cranial nerves 2 - 12 grossly intact            Psychiatric:  Normal     Results Review:     I reviewed the patient's new clinical results.   Results from last 7 days   Lab Units 03/27/23  0423 03/26/23  1828 03/26/23  0811 03/25/23  0513 03/24/23  0641 03/23/23  0229 03/22/23  0616 03/21/23  2129   SODIUM mmol/L 142  --  150* 148*   < > 146*   < > 145   POTASSIUM mmol/L 4.5 5.0 3.6 4.6   < > 4.2   < > 3.1*   CHLORIDE mmol/L 100  --  106 104   < > 99   < > 88*   CO2 mmol/L 33.0*  --  33.0* 41.0*   < > 44.0*   < > >50.0*   BUN mg/dL 42*  --  40* 38*   < > 35*   < > 24*   CREATININE mg/dL 1.02*  --  0.84 0.88   < > 1.10*   < > 1.23*   CALCIUM mg/dL 7.9*  --  7.8* 9.0    < > 8.4*   < > 8.7   BILIRUBIN mg/dL 0.2  --   --   --   --  0.3  --  0.7   ALK PHOS U/L 43  --   --   --   --  57  --  80   ALT (SGPT) U/L 15  --   --   --   --  16  --  9   AST (SGOT) U/L 12  --   --   --   --  29  --  17   GLUCOSE mg/dL 416*  --  212* 89   < > 112*   < > 164*    < > = values in this interval not displayed.     Results from last 7 days   Lab Units 03/27/23  0423 03/27/23  0103 03/26/23  1828 03/26/23  1225 03/26/23  0811 03/25/23  0513   WBC 10*3/mm3 9.27  --   --   --  4.75 7.60   HEMOGLOBIN g/dL 8.1* 8.3* 8.6*   < > 7.8* 8.9*   HEMATOCRIT % 30.0* 31.1* 31.4*   < > 27.4* 32.9*   PLATELETS 10*3/mm3 132*  --   --   --  126* 143    < > = values in this interval not displayed.     Results from last 7 days   Lab Units 03/23/23  0431   PH, ARTERIAL pH units 7.314*   PO2 ART mm Hg 61.1*   PCO2, ARTERIAL mm Hg 87.2*   HCO3 ART mmol/L 44.3*     I reviewed the patient's new imaging including images and reports.    XR Chest 1 View    Result Date: 3/27/2023  XR CHEST 1 VW Date of Exam: 3/27/2023 5:10 AM EDT Indication: respiratory failure. Comparison: March 26, 2023 Findings: There is a feeding tube in the stomach. There is a right-sided PICC line with tip in superior vena cava. There are small bilateral pleural effusions. There is bilateral basilar atelectasis.     Impression: Impression: Bilateral basilar atelectasis. Small bilateral pleural effusions. Stable chest. Electronically Signed: Carlton Ralph  3/27/2023 8:29 AM EDT  Workstation ID: [id number]    XR Chest 1 View    Result Date: 3/26/2023  EXAMINATION: XR CHEST 1 VW DATE: 3/26/2023 2:23 AM INDICATION:  Possible right pneumothorax on radiograph of the abdomen; COMPARISON:  Chest radiograph March 25, 2023 and abdominal radiograph March 26, 2023 FINDINGS: Right arm PICC unchanged. Enteric tube tip past GE junction and off image. Small bilateral pleural effusions redemonstrated. Diffuse pulmonary vascular interstitial prominence unchanged.  Cardiomediastinal silhouette  unchanged. No evidence of pneumothorax.     Impression: 1.  No evidence of pneumothorax. Finding on abdominal radiograph was likely artifactual. 2.  No significant change since chest radiograph yesterday. Electronically signed by:  Davi Valentine M.D.  3/26/2023 1:30 AM Mountain Time    XR Chest 1 View    Result Date: 3/25/2023  XR CHEST 1 VW Date of Exam: 3/25/2023 5:18 AM EDT Indication: Respiratory failure. Comparison: 3/24/2023 Findings: Feeding tube remains in the stomach. PICC line remains in the mid SVC. Heart shadow is normal in size. Diffuse and extensive pulmonary interstitial disease shows minimal interval change from yesterday's study, perhaps a little increased in the left lung base, generally stable elsewhere. The earlier 3/21/2023 chest CT scan showed right upper and middle lobe pneumonia, and relatively mild changes elsewhere. There are minimal effusions. No pneumothorax is identified.     Impression: Impression: Largely stable pulmonary interstitial disease pattern, perhaps minimally increased in the left lung base. No significant new chest disease compared to 3/24/2023 exam. Electronically Signed: Nathanael Macias  3/25/2023 9:12 AM EDT  Workstation ID: [id number]    XR Chest 1 View    Result Date: 3/24/2023  XR CHEST 1 VW, XR ABDOMEN KUB Date of Exam: 3/24/2023 8:10 PM EDT Indication: Respiratory failure. Comparison: Chest radiograph dated 3/23/2023 and 3/21/2023 Findings: Chest: Right upper lobe opacity has improved with some residual remaining. There are layering bilateral pleural effusions right greater than left. Extensive underlying emphysema is suspected. Pulmonary vascularity is unremarkable. Mild cardiomegaly is unchanged. A right-sided PICC is present with the tip in the lower SVC. Feeding tube courses below the diaphragm. The trachea is midline. There are senescent changes in the shoulders. No acute bony abnormality. KUB: The feeding tube courses below the diaphragm  with the tip in the region of the distal stomach near the pylorus. Visualized bowel gas pattern is nonspecific. No suspicious gas collections.     Impression: Impression: 1. Further improvement in the right upper lobe opacity with some residual remaining favored to represent improving pneumonia. Continued follow-up is needed to ensure complete radiographic resolution. 2. Small bilateral pleural effusions are suspected. Support lines and tubes are unchanged. 3. Feeding tube tip is in the region of the distal stomach near the pylorus. Electronically Signed: Ray Villasenor  3/24/2023 8:57 PM EDT  Workstation ID: [id number]      Medication Review:   budesonide, 0.5 mg, Nebulization, BID - RT  heparin (porcine), 5,000 Units, Subcutaneous, Q8H  insulin detemir, 6 Units, Subcutaneous, Daily  insulin regular, 0-7 Units, Subcutaneous, Q6H  ipratropium-albuterol, 3 mL, Nebulization, Q4H - RT  methylPREDNISolone sodium succinate, 40 mg, Intravenous, Q8H  metoprolol succinate XL, 50 mg, Oral, Daily  pantoprazole, 40 mg, Intravenous, Q AM  piperacillin-tazobactam, 3.375 g, Intravenous, Q8H  sertraline, 100 mg, Oral, Daily  sodium chloride, 10 mL, Intravenous, Q12H      amiodarone, 1 mg/min, Last Rate: 1 mg/min (03/27/23 0449)  sodium chloride, 75 mL/hr, Last Rate: 75 mL/hr (03/27/23 0856)        Assessment & Plan       AoC respiratory failure with hypoxia and hypercapnia    Stage 4 very severe COPD by GOLD classification    Bronchiectasis    Pulmonary nodule    RA (rheumatoid arthritis)    On chronic immunosuppression therapy    JORGITO    GERD    UTI (urinary tract infection)    Acute respiratory failure with hypoxia and hypercarbia (HCC)    Anemia    Atrial fibrillation with RVR (HCC)    Dysphagia    Sharmila Delarosa is a 72 year-old female with PMH of former tobacco abuse, COPD, chronic hypoxic and hypercarbic respiratory failure on 2L NC at home, bronchiectasis, LLL pulmonary nodule, rheumatoid arthritis on chronic suppression  therapy with prednisone, MTX, and Plaquenil, right breast cancer s/p lumpectomy and adjuvant chemo & XRT; currently on tamoxifen (2015), and GERD that was admitted on 3/21/23 for acute on chronic respiratory failure with hypoxia and hypercarbia. She has been in the ICU since admission on Bipap/HFNC, however, she has not been improving with oxygen requirements. SLP is following and FEES 3/24 showed no aspiration with nectar thick liquids. Following FEES she was given a diet, but unfortunately did have immediate desaturation requiring bipap to recover. Feeding was again trialed on 3/25 with same results. Went into A. Fib with RVR on 3/25. Home metoprolol was started but did not aid in conversion. Ultimately placed on Amiodarone drip which did convert her to NSR.      AoC Respiratory Failure with Hypoxia and Hypercapnia  Stage IV COPD  Bronchiectasis  -Admitted 3/21 for AoC respiratory failure. Continues to require Bipap with sleep and HFNC during day  -Continue budesonide, duo-nebs, solu-medrol. F/u cultures which are negative to date  -Of note KUB 3/26 to evaluate DHFT placement showed pneumothorax, repeat CXR did not support this. No PTX.    Dysphagia  -FEES 3/24 demonstrated that she tolerated nectar thick liquids, however, she declined after being started on diet. She required bipap for resolution of hypoxia.   -Repeat trial 3/25 again resulted in immediate desaturation requiring bipap.   -NPO; DHFT placed and started on TF    HTN  Tachycardia  A. Fib with RVRA  -Patient takes metoprolol at home; restarted yesterday  -Re-entered into A. Fib with RVR overnight and stared on Amiodarone protocol  -3/26 stopped Amiodarone, continued Metoprolol and added Diltiazem with Cards consult  -Cards recommended Amiodarone protocol which was started  -Will stop Dilt and continue Amiodarone   -In setting of anemia will defer anticoagulation for now  -PRN Labetalol available    UTI  Sepsis  JORGITO  -BUN/Creat elevated on admission @  24/1.23; initially improved, down to 0.84 yesterday, back up to 1.02 this morning  -4+ bacteria on UA on admission; no cultures done  -Continue Zosyn for 7 days (3/28 stop date)  -Will trend creatinine daily to assess for further renal decline    Rheumatoid Arthritis  -Continue solu-medrol at current dosing for now.   -Continue to hold other immunosuppressive drugs  -Complicates care    Anemia  -Patient has a macrocytic anemia going back to September of 2022.   -Folate and Vitamin B12 normal.   -She is s/p 2 units PRBCs on 3/23/23 and she has responded appropriately.   -Will continue to monitor.   -Might need further work-up later; occult blood negative  -Hgb 7.4 yesterday AM; up to 8.1 this morning. Will defer anticoagulation for A.fib due to this    Hyperglycemia  -Related to steroids and tube feeding  -Scheduled Levemir 6 units started overnight  -Continue sliding scale insulin as ordered    Pulmonary Nodule  -4x3.2 cm nodule in RUL with multiple nodules  -Will need follow up outpatient    F: Fibersource goal 75  A: PRN Tylenol  S: NA  T: Heparin and SCDs  H: HOB elevated  U: Pantoprazole  G: Levemir 6  Units and SSI  S: NA  B: Having BM's  I: PIV and external urinary catheter  D: NA    AM Labs and CXR ordered  DISPO: Continue in ICU    DINO Mcgee  03/27/23  11:06 EDT    Level of Risk High due to: risk of life and/or respiratory failure    Racquel Avendano, CHAVA, AGACNP-BC, APRN    Electronically signed by DINO Mcgee, 03/27/23, 11:17 AM EDT.    Copied text in this note has been reviewed and is accurate as of 03/27/23.

## 2023-03-27 NOTE — PLAN OF CARE
Goal Outcome Evaluation:  Plan of Care Reviewed With: patient           Outcome Evaluation: confusion remains but oriented at times. sats drop frequently, back on bipap late in dayshift and increased to 60%fio2, S/SB on monitor, transitioning to PO amio this evening.  liquid BMs continue, FMS inserted and tube feed left at 50.  FEES planned for tomorrow.  fam at BS.

## 2023-03-27 NOTE — PLAN OF CARE
Goal Outcome Evaluation:    SLP re-evaluation completed. Will continue to address dysphagia via FEES. Please see note for further details and recommendations.

## 2023-03-28 NOTE — PLAN OF CARE
Goal Outcome Evaluation:  Plan of Care Reviewed With: patient        Progress: no change   SLP re-evaluation completed. Will continue to address dysphagia in tx, as appropriate. Please see note for further details and recommendations.

## 2023-03-28 NOTE — PLAN OF CARE
Goal Outcome Evaluation:  Plan of Care Reviewed With: patient           Outcome Evaluation: more oriented and interactive today.  a few episodes of low sats but able to wean fio2 a little.  55flow/50%FIO2 HFNC currently.  tf changed to pep 1.5. tolerating, liquid stool continues, FMS in place. fees completed.

## 2023-03-28 NOTE — PROGRESS NOTES
Pulmonary/Critical Care History and Physical Exam     LOS: 6 days   Patient Care Team:  Bala Gomez MD as PCP - General (Family Medicine)    Chief Complaint:    Chief Complaint   Patient presents with   • Respiratory Distress       Subjective     Interval History:  Chart reviewed. Patient continues to require bipap at night and HFNC during day. She is on 60% and 50L HFNC on my assessment. She does answer my questions and is oriented. Continues to have diarrhea. Repeat FEES pending today.     History taken from: patient    Past Medical History:   Diagnosis Date   • Anxiety    • Breast cancer (HCC) 2015    Right breast    • Depression    • Drug therapy    • GERD 3/21/2023   • Hx of radiation therapy    • On chronic immunosuppression therapy 3/21/2023   • RA (rheumatoid arthritis) 3/21/2023       Past Surgical History:   Procedure Laterality Date   • BREAST BIOPSY Right 2015    X 3 BX's   • BREAST LUMPECTOMY         Family History   Problem Relation Age of Onset   • Aortic stenosis Mother    • Melanoma Father    • Ovarian cancer Sister    • Breast cancer Sister    • Rectal cancer Sister    • Breast cancer Maternal Grandmother    • Breast cancer Daughter        Social History     Socioeconomic History   • Marital status:    Tobacco Use   • Smoking status: Former     Packs/day: 1.00     Years: 40.00     Pack years: 40.00     Types: Cigarettes     Quit date:      Years since quittin.2   • Smokeless tobacco: Never   Vaping Use   • Vaping Use: Never used   Substance and Sexual Activity   • Alcohol use: Never   • Drug use: Never   • Sexual activity: Defer       Allergies   Allergen Reactions   • Cephalosporins Unknown - Low Severity       PMH/FH/SocH were reviewed by me and updates were made.     Review of Systems:    All systems were reviewed and negative except as noted in subjective.      Objective     Vital Signs  Temp:  [97.7 °F (36.5 °C)-98.4 °F (36.9 °C)] 97.7 °F (36.5 °C)  Heart Rate:  [62-79]  72  Resp:  [16-24] 24  BP: ()/(48-83) 129/54    Physical Exam:     General Appearance:    Resting in bed. Alert, cooperative, in no acute distress   Head:    Normocephalic, without obvious abnormality, atraumatic   Eyes:            Lids and lashes normal, conjunctivae and sclerae normal, no   icterus, no pallor, corneas clear, PERRLA   ENMT:   Ears appear intact with no abnormalities noted      No oral lesions, no thrush, oral mucosa moist      No adenopathy, supple, trachea midline, no thyromegaly, no   carotid bruit, no JVD       Lungs/resp:     Normal effort, symmetric chest rise, no crepitus, clear to      auscultation bilaterally, no chest wall tenderness, resonant to percussion throughout.                  Heart/CV:    Regular rhythm and normal rate, normal S1 and S2, no            murmur, no gallop, no rub, no click   Abdomen/GI:     Normal bowel sounds, no masses, no organomegaly, soft        non-tender, non-distended, no guarding, no rebound                tenderness   G/U:     Deferred   Extremities/MSK:   Right arm PICC. No clubbing, cyanosis or edema.  No deformities.    Pulses:   Pulses palpable and equal bilaterally   Skin:   No bleeding, bruising or rash   Hem/Lymph:   No cervical or supraclavicular adenopathy.    Neurologic:   EMV 14, Moves all extremities with no obvious focal motor deficit.  Cranial nerves 2 - 12 grossly intact            Psychiatric:  Normal    Copied text in this note has been reviewed and is accurate as of 03/28/23.        Results Review:     I reviewed the patient's new clinical results.   Results from last 7 days   Lab Units 03/28/23  0527 03/27/23  0423 03/26/23  1828 03/26/23  0811 03/24/23  0641 03/23/23  0229 03/22/23  0616 03/21/23  2129   SODIUM mmol/L 148* 142  --  150*   < > 146*   < > 145   POTASSIUM mmol/L 4.8 4.5 5.0 3.6   < > 4.2   < > 3.1*   CHLORIDE mmol/L 108* 100  --  106   < > 99   < > 88*   CO2 mmol/L 37.0* 33.0*  --  33.0*   < > 44.0*   < > >50.0*    BUN mg/dL 40* 42*  --  40*   < > 35*   < > 24*   CREATININE mg/dL 0.86 1.02*  --  0.84   < > 1.10*   < > 1.23*   CALCIUM mg/dL 8.5* 7.9*  --  7.8*   < > 8.4*   < > 8.7   BILIRUBIN mg/dL  --  0.2  --   --   --  0.3  --  0.7   ALK PHOS U/L  --  43  --   --   --  57  --  80   ALT (SGPT) U/L  --  15  --   --   --  16  --  9   AST (SGOT) U/L  --  12  --   --   --  29  --  17   GLUCOSE mg/dL 220* 416*  --  212*   < > 112*   < > 164*    < > = values in this interval not displayed.     Results from last 7 days   Lab Units 03/28/23  0527 03/27/23  0423 03/27/23  0103 03/26/23  1225 03/26/23  0811   WBC 10*3/mm3 11.37* 9.27  --   --  4.75   HEMOGLOBIN g/dL 8.3* 8.1* 8.3*   < > 7.8*   HEMATOCRIT % 30.9* 30.0* 31.1*   < > 27.4*   PLATELETS 10*3/mm3 114* 132*  --   --  126*    < > = values in this interval not displayed.     Results from last 7 days   Lab Units 03/23/23  0431   PH, ARTERIAL pH units 7.314*   PO2 ART mm Hg 61.1*   PCO2, ARTERIAL mm Hg 87.2*   HCO3 ART mmol/L 44.3*     I reviewed the patient's new imaging including images and reports.    XR Chest 1 View    Result Date: 3/28/2023  XR CHEST 1 VW Date of Exam: 3/28/2023 4:28 AM EDT Indication: respiratory failure. Comparison: 3/27/2023 Findings: PICC line tip remains in the proximal SVC. Feeding tube is seen below the left hemidiaphragm. Heart is in the upper range of normal size. The vasculature appears mildly cephalized, but stable. Skinfold shadows are superimposed over both right and left lateral chest, and allowing for this, no pneumothorax is seen. Mild bibasilar interstitial changes are stable.     Impression: Impression: No new chest disease. Electronically Signed: Nathanael Macias  3/28/2023 8:49 AM EDT  Workstation ID: [id number]    XR Chest 1 View    Result Date: 3/27/2023  XR CHEST 1 VW Date of Exam: 3/27/2023 5:10 AM EDT Indication: respiratory failure. Comparison: March 26, 2023 Findings: There is a feeding tube in the stomach. There is a right-sided PICC line  with tip in superior vena cava. There are small bilateral pleural effusions. There is bilateral basilar atelectasis.     Impression: Impression: Bilateral basilar atelectasis. Small bilateral pleural effusions. Stable chest. Electronically Signed: Carlton Ralph  3/27/2023 8:29 AM EDT  Workstation ID: [id number]    XR Chest 1 View    Result Date: 3/26/2023  EXAMINATION: XR CHEST 1 VW DATE: 3/26/2023 2:23 AM INDICATION:  Possible right pneumothorax on radiograph of the abdomen; COMPARISON:  Chest radiograph March 25, 2023 and abdominal radiograph March 26, 2023 FINDINGS: Right arm PICC unchanged. Enteric tube tip past GE junction and off image. Small bilateral pleural effusions redemonstrated. Diffuse pulmonary vascular interstitial prominence unchanged. Cardiomediastinal silhouette  unchanged. No evidence of pneumothorax.     Impression: 1.  No evidence of pneumothorax. Finding on abdominal radiograph was likely artifactual. 2.  No significant change since chest radiograph yesterday. Electronically signed by:  Davi Valentine M.D.  3/26/2023 1:30 AM Mountain Time      Medication Review:   amiodarone, 200 mg, Oral, Q12H  budesonide, 0.5 mg, Nebulization, BID - RT  heparin (porcine), 5,000 Units, Subcutaneous, Q8H  [START ON 3/29/2023] insulin detemir, 10 Units, Subcutaneous, Daily  insulin regular, 0-7 Units, Subcutaneous, Q6H  ipratropium-albuterol, 3 mL, Nebulization, Q4H - RT  methylPREDNISolone sodium succinate, 40 mg, Intravenous, Q8H  metoprolol succinate XL, 50 mg, Oral, Daily  pantoprazole, 40 mg, Intravenous, Q AM  piperacillin-tazobactam, 3.375 g, Intravenous, Q8H  sertraline, 100 mg, Oral, Daily  sodium chloride, 10 mL, Intravenous, Q12H           Assessment & Plan       AoC respiratory failure with hypoxia and hypercapnia    Stage 4 very severe COPD by GOLD classification    Bronchiectasis    Pulmonary nodule    RA (rheumatoid arthritis)    On chronic immunosuppression therapy    JORGITO    GERD    UTI (urinary  tract infection)    Acute respiratory failure with hypoxia and hypercarbia (HCC)    Anemia    Atrial fibrillation with RVR (HCC)    Dysphagia    Sharmila Delarosa is a 72 year-old female with PMH of former tobacco abuse, COPD, chronic hypoxic and hypercarbic respiratory failure on 2L NC at home, bronchiectasis, LLL pulmonary nodule, rheumatoid arthritis on chronic suppression therapy with prednisone, MTX, and Plaquenil, right breast cancer s/p lumpectomy and adjuvant chemo & XRT; currently on tamoxifen (2015), and GERD that was admitted on 3/21/23 for acute on chronic respiratory failure with hypoxia and hypercarbia. She has been in the ICU since admission on Bipap/HFNC, however, she has not been improving with oxygen requirements. SLP is following and FEES 3/24 showed no aspiration with nectar thick liquids. Following FEES she was given a diet, but unfortunately did have immediate desaturation requiring bipap to recover. Feeding was again trialed on 3/25 with same results. Went into A. Fib with RVR on 3/25. Home metoprolol was started but did not aid in conversion. Ultimately placed on Amiodarone drip which did convert her to NSR.     AoC Respiratory Failure with Hypoxia and Hypercapnia  Stage IV COPD  Bronchiectasis  -Admitted 3/21 for AoC respiratory failure. Continues to require Bipap with sleep and HFNC during day  -Continue budesonide, duo-nebs, solu-medrol. F/u cultures which are negative to date  -Of note KUB 3/26 to evaluate DHFT placement showed pneumothorax, repeat CXR did not support this. No PTX.    Dysphagia  -FEES 3/24 demonstrated that she tolerated nectar thick liquids, however, she declined after being started on diet. She required bipap for resolution of hypoxia.   -Repeat trial 3/25 again resulted in immediate desaturation requiring bipap.   -NPO; DHFT placed and started on TF  -Repeat FEES today  -Depending on results will need to have discussion with family regarding goals of care for feeding.  Comfort diet vs. PEG    HTN  Tachycardia  A. Fib with RVRA  -Patient takes metoprolol at home; restarted 3/26  -Re-entered into A. Fib with RVR 3/26 PM and stared on Amiodarone protocol  -3/26 with COPD (Concern for lung toxicity) stopped Amiodarone, continued Metoprolol and added Diltiazem, Cards consulted  -Cards following recommended Amiodarone protocol which was restarted 3/27; will transition to PO 3/28  -Dilt stopped  -In setting of anemia will defer anticoagulation for now  -PRN Labetalol available    Diarrhea  -Diarrhea started when TF started  -Rectal tube in place draining large amounts of stool   -No fever, very mild leukocytosis; Monitor for now    UTI  Sepsis  JORGITO  -BUN/Creat elevated on admission @ 24/1.23; continues to improve 0.86 this AM  -4+ bacteria on UA on admission; no cultures done  -Completed 7 days of Zosyn (Ended 3/28)  -Will trend creatinine daily to assess for further renal decline    Rheumatoid Arthritis  -Transition solumedrol to Prednisone PO with taper  -Restart Plaquenil   -Continue to hold other immunosuppressive drugs  -Complicates care    Anemia  -Patient has a macrocytic anemia going back to September of 2022.   -Folate and Vitamin B12 normal.   -She is s/p 2 units PRBCs on 3/23/23 and she has responded appropriately.   -Will continue to monitor.   -Might need further work-up later; occult blood negative  -Hgb 7.4 yesterday AM; up to 8.3 this morning.   -Will defer anticoagulation for A.fib due to this    Hyperglycemia  -Related to steroids and tube feeding  -Increase scheduled Levemir to 10 units  -Continue sliding scale insulin as ordered    Pulmonary Nodule  -4x3.2 cm nodule in RUL with multiple nodules  -Will need follow up outpatient    F: Changed to Peptament 1.5 by Nutrition   A: PRN Tylenol  S: NA  T: Heparin and SCDs  H: HOB elevated  U: Pantoprazole  G: Levemir 10 Units and SSI  S: NA  B: Having BM's  I: PIV, rectal tube and external urinary catheter  D: Completed 7  days of Zosyn (Ended 3/28)    AM Labs and CXR ordered  DISPO: Continue in ICU    DINO Mcgee  03/28/23  11:21 EDT    Level of Risk High due to: risk of life and/or respiratory failure    Racquel Avendano MSN, AGACNP-BC, APRN    Electronically signed by DINO Mcgee, 03/28/23, 11:38 AM EDT.    Copied text in this note has been reviewed and is accurate as of 03/28/23.

## 2023-03-28 NOTE — MBS/VFSS/FEES
Acute Care - Speech Language Pathology   Swallow Re-Evaluation [facility]   Fiberoptic Endoscopic Evaluation of Swallowing (FEES)     Patient Name: Sharmila Delarosa  : 1950  MRN: [id number]  Today's Date: 3/28/2023               Admit Date: 3/21/2023    Visit Dx:     ICD-10-CM ICD-9-CM   1. Acute respiratory failure with hypoxia and hypercarbia (HCC)  J96.01 518.81    J96.02    2. Lethargy  R53.83 780.79   3. Hypoxia  R09.02 799.02   4. Stage 4 very severe COPD by GOLD classification  J44.9 496   5. AoC respiratory failure with hypoxia and hypercapnia  J96.21 518.84    J96.22 786.09     799.02   6. Oropharyngeal dysphagia  R13.12 787.22     Patient Active Problem List   Diagnosis   • Stage 4 very severe COPD by GOLD classification   • Bronchiectasis   • Chronic respiratory failure with hypoxia (HCC)   • Pulmonary nodule   • Former smoker   • AoC respiratory failure with hypoxia and hypercapnia   • RA (rheumatoid arthritis)   • On chronic immunosuppression therapy   • JORGITO   • GERD   • UTI (urinary tract infection)   • Acute respiratory failure with hypoxia and hypercarbia (HCC)   • Anemia   • Atrial fibrillation with RVR (HCC)   • Dysphagia     Past Medical History:   Diagnosis Date   • Anxiety    • Breast cancer (HCC) 2015    Right breast    • Depression    • Drug therapy    • GERD 3/21/2023   • Hx of radiation therapy    • On chronic immunosuppression therapy 3/21/2023   • RA (rheumatoid arthritis) 3/21/2023     Past Surgical History:   Procedure Laterality Date   • BREAST BIOPSY Right 2015    X 3 BX's   • BREAST LUMPECTOMY         SLP Recommendation and Plan  SLP Swallowing Diagnosis: mild-moderate, oral dysphagia, mod-severe, pharyngeal dysphagia (23 1030)    High risk for aspiration of all consistencies 2' respiratory status.  SLP Diet Recommendation: other (see comments) (Safest: NPO, alternate means nutrition. Conservative comfort diet: consider one 3 oz pudding/puree snack per tray (3 snacks  total/day) & would still need some form alternate nutrition. Full comfort diet: consider soft vs pureed & thin via tsp-sized sips.) (03/28/23 1030) Notified APRN who plans to discuss GOC further w/ pt/family.      SLP Rec. for Method of Medication Administration: meds via alternate route (03/28/23 1030)           Swallow Criteria for Skilled Therapeutic Interventions Met: demonstrates skilled criteria (03/28/23 1030)  Anticipated Discharge Disposition (SLP): anticipate therapy at next level of care, skilled nursing facility (03/28/23 1030)  Rehab Potential/Prognosis, Swallowing: re-evaluate goals as necessary (03/28/23 1030)  Therapy Frequency (Swallow): 5 days per week (03/28/23 1030)  Predicted Duration Therapy Intervention (Days): until discharge (03/28/23 1030)     Swallowing Considerations per Physician Discretion: medical management of suspected esophageal dysphagia, as indicated (03/28/23 1030)          Plan of Care Reviewed With: patient  Progress: no change      SWALLOW EVALUATION (last 72 hours)     SLP Adult Swallow Evaluation     Row Name 03/28/23 1030 03/27/23 1300                Rehab Evaluation    Document Type re-evaluation  -AC re-evaluation  -CH       Subjective Information complains of;dyspnea  -AC no complaints  -CH       Patient Observations alert;cooperative  -AC alert;cooperative;agree to therapy  -CH       Patient/Family/Caregiver Comments/Observations No family present. In bilateral soft wrist restraints.  -AC dtr present  -CH       Patient Effort adequate  -AC adequate  -CH       Symptoms Noted During/After Treatment -- none  -CH          General Information    Patient Profile Reviewed yes  -AC yes  -CH       Pertinent History Of Current Problem See previous eval.  -AC see prior evaluation, patient currently NPO after desatting with single sip of liquids on 2 separate occassions.  -CH       Current Method of Nutrition NPO;nasogastric feedings  -AC NPO;nasogastric feedings  -CH        Precautions/Limitations, Vision -- WFL  -CH       Precautions/Limitations, Hearing -- WFL  -CH       Prior Level of Function-Communication -- unknown  -CH       Prior Level of Function-Swallowing -- unknown  -       Plans/Goals Discussed with patient;agreed upon  - patient and family;agreed upon  -       Barriers to Rehab medically complex  -AC medically complex  -       Patient's Goals for Discharge patient did not state  - return to PO diet  -CH          Pain    Additional Documentation -- Pain Scale: FACES Pre/Post-Treatment (Group)  -          Pain Scale: FACES Pre/Post-Treatment    Pain: FACES Scale, Pretreatment 0-->no hurt  -AC 0-->no hurt  -CH       Posttreatment Pain Rating 0-->no hurt  -AC 0-->no hurt  -CH          Oral Motor Structure and Function    Dentition Assessment -- natural, present and adequate  -CH       Secretion Management -- WNL/WFL  -CH       Mucosal Quality -- dry  -CH       Volitional Swallow -- delayed  -CH       Volitional Cough -- weak  -          Oral Musculature and Cranial Nerve Assessment    Oral Motor General Assessment -- generalized oral motor weakness;oral labial or buccal impairment  -       Oral Labial or Buccal Impairment, Detail, Cranial Nerve VII (Facial): -- left labial droop  -CH          General Eating/Swallowing Observations    Respiratory Support Currently in Use high-flow nasal cannula  -AC high-flow nasal cannula  -       O2 Liters -- other (see comments)  60  -CH       Eating/Swallowing Skills fed by staff/caregiver  - fed by SLP  -CH       Positioning During Eating upright 90 degree;upright in bed  - upright 90 degree;upright in bed  -       Utensils Used -- spoon;cup;straw  -       Consistencies Trialed -- ice chips;thin liquids;nectar/syrup-thick liquids;pureed  -CH       Pre SpO2 (%) -- 99  -CH       Post SpO2 (%) -- 100  -CH          Respiratory    Respiratory Status reduction in SpO2;during swallowing/eating  - WFL;other (see  comments)  HFNC  -CH       Respiratory Pattern decrease control/incoordination of breathing swallow  -AC --          Clinical Swallow Eval    Oral Prep Phase -- WFL  -CH       Pharyngeal Phase -- no overt signs/symptoms of pharyngeal impairment  -       Clinical Swallow Evaluation Summary -- No overt clinical s/s of aspiration with any consistency, however, given patients current respiratory status and recent hx of desatting with PO intake. Recommend continued NPO with temporary alternate means of nutrition. FEES to further evaluate. Patient ok for 4-5 ice chips per hour until FEES.  -          Fiberoptic Endoscopic Evaluation of Swallowing (FEES)    Risks/Benefits Reviewed risks/benefits explained;patient;agreed to eval  -AC --       Nasal Entry left:  -AC --       Scope serial number/identification [id number]  -AC --          Anatomy and Physiology    Anatomic Considerations other (see comments)  small, spherical lesion L vallecular region  -AC --       Velopharyngeal WFL  -AC --       Base of Tongue symmetrical  -AC --       Epiglottis WFL  -AC --       Laryngeal Function Breathing symmetrical  -AC --       Laryngeal Function Phonation symmetrical  -AC --       Laryngeal Function to Breath Hold CNA;other (see comments)  2' respiratory status  -AC --       Secretion Rating Scale (Francisco et al. 1996) 0- normal, no visible secretions  -AC --       Sensory sensed scope  -AC --       Utensils Used Spoon;Cup;Straw  -AC --       Consistencies Trialed thin liquids;honey-thick liquids;pudding/puree  -AC --          FEES Interpretation    Oral Phase prespill of liquids into pharynx;with liquids only;oral residue present;solids not tested  -AC --       Oral Phase, Comment Needed cues at time to close lips around spoon/cup/straw. Suspect cognitive impairments affecting oral phase. Pt also exhibited premature spillage 2' reduced lingual control/respiratory status & mild oral residue w/ all consistencies. DNT solids 2' high  "aspiration/fatigue risk.  -AC --          Initiation of Pharyngeal Swallow    Initiation of Pharyngeal Swallow bolus in pyriform sinuses  -AC --       Pharyngeal Phase impaired pharyngeal phase of swallowing  -AC --       Penetration Before the Swallow thin liquids;honey-thick liquids;pudding/puree;secondary to reduced back of tongue control;secondary to delayed swallow initiation or mistiming  -AC --       Aspiration After the Swallow thin liquids;honey-thick liquids;secondary to residue;in laryngeal vestibule;other (see comments)  + oral residue that pooled to pharynx  -AC --       Depth of Penetration shallow  thin/honey vestibular residue deepened; appeared to expel pudding vestibular residue w/ completion of the swallow  -AC --       Response to Aspiration No  -AC --       No spontaneous response to aspiration with non-effective subglottic clearance with cue (see comments)  -AC --       Rosenbek's Scale thin:;honey:;8-->Level 8;pudding/puree:;2-->Level 2  -AC --       Residue thin liquids;honey-thick liquids;diffuse within pharynx;secondary to reduced laryngeal elevation;secondary to reduced hyolaryngeal excursion;other (see comments)  mild  -AC --       Response to Residue cleared residue;with spontaneous subsequent swallow;with cued swallow  -AC --       Attempted Compensatory Maneuvers bolus size;bolus presentation style  -AC --       Response to Attempted Compensatory Maneuvers did not prevent aspiration  -AC --       FEES Summary Pt switched from BiPAP to HFNC for study. Required increased flow during study, as provided by RN. Frequently desaturated to low-mid 80%s during study. Required constant cues/breaks to \"take deep breaths.\" There was aspiration of thin & honey-thick liquids. Eventually began to penetrate pudding consistency as finishing around 3oz. No aspiration appreciated w/ pudding; however, considered high risk for aspiration given respiratory status. Pt also exhibited evidence of retrograde " flow during study. Belching & coughing t/o. Concern that would be at high risk for aspiration w/ any PO intake--and would have difficulty adequately maintaining nutrition/hydration w/ PO intake alone given respiratory status. Notified APRN who agreed pt would be @ high risk for aspiration. Safest diet is NPO w/ continued alternate means of nutrition. If would like to initiate conservative comfort diet, would consider one 3 oz pudding/puree snack per tray (3 snacks total per day). Would likely still need some form of alternate nutrition w/ this option. If full comfort diet desired, may consider soft vs pureed texture & thin via tsp-sized sips, as tolerated.  -AC --          SLP Evaluation Clinical Impression    SLP Swallowing Diagnosis mild-moderate;oral dysphagia;mod-severe;pharyngeal dysphagia  - functional oral phase;R/O pharyngeal dysphagia;suspected esophageal dysphagia  -       Functional Impact risk of aspiration/pneumonia;risk of malnutrition;risk of dehydration  - risk of aspiration/pneumonia  -       Rehab Potential/Prognosis, Swallowing re-evaluate goals as necessary  -AC adequate, monitor progress closely  -       Swallow Criteria for Skilled Therapeutic Interventions Met demonstrates skilled criteria  -AC demonstrates skilled criteria  -          Recommendations    Therapy Frequency (Swallow) 5 days per week  -AC --       Predicted Duration Therapy Intervention (Days) until discharge  -AC until discharge  -       SLP Diet Recommendation other (see comments)  Safest: NPO, alternate means nutrition. Conservative comfort diet: consider one 3 oz pudding/puree snack per tray (3 snacks total/day) & would still need some form alternate nutrition. Full comfort diet: consider soft vs pureed & thin via tsp-sized sips.  -AC NPO;temporary alternate methods of nutrition/hydration;other (see comments)  ok for 4-5 ice chips, after oral care  -       Recommended Diagnostics -- FEES  -       Oral Care  Recommendations Oral Care BID/PRN;Suction toothbrush  - Oral Care BID/PRN  -       SLP Rec. for Method of Medication Administration meds via alternate route  - meds via alternate route  -       Monitor for Signs of Aspiration -- notify SLP if any concerns  -       Anticipated Discharge Disposition (SLP) anticipate therapy at next level of care;skilled nursing facility  - inpatient rehabilitation facility;anticipate therapy at next level of care  -       Swallowing Considerations per Physician Discretion medical management of suspected esophageal dysphagia, as indicated  - --             User Key  (r) = Recorded By, (t) = Taken By, (c) = Cosigned By    Initials Name Effective Dates     Sophia Mckay, MS CCC-SLP 02/03/23 -      Jenny Elizondo MS CCC-SLP 06/16/21 -                 EDUCATION  The patient has been educated in the following areas:   Dysphagia (Swallowing Impairment) Oral Care/Hydration NPO rationale.        SLP GOALS     Row Name 03/28/23 1030             (LTG) Patient will demonstrate functional swallow for    Diet Texture (Demonstrate functional swallow) pureed textures  -AC      Liquid viscosity (Demonstrate functional swallow) thin liquids  -AC      [place] (Demonstrate functional swallow) with 1:1 assist/ supervision  -      Time Frame (Demonstrate functional swallow) by discharge  -AC      Progress/Outcomes (Demonstrate functional swallow) goal revised this date  -AC         (STG) Patient will tolerate trials of    Consistencies Trialed (Tolerate trials) soft to chew (chopped) textures;nectar/ mildly thick liquids  -AC      Desired Outcome (Tolerate trials) without signs/symptoms of aspiration;without signs of distress;with adequate oral prep/transit/clearance  -AC      [place] (Tolerate trials) independently (over 90% accuracy)  -AC      Time Frame (Tolerate trials) by discharge  -AC      Progress/Outcomes (Tolerate trials) goal no longer appropriate  -AC          (STG) Patient will tolerate therapeutic trials of    Consistencies Trialed (Tolerate therapeutic trials) thin liquids;pureed textures  -AC      Desired Outcome (Tolerate therapeutic trials) without signs/symptoms of aspiration;without signs of distress  -AC      [place] (Tolerate therapeutic trials) with maximum cues (25-49% accuracy)  -AC      Time Frame (Tolerate therapeutic trials) by discharge  -AC      Progress/Outcomes (Tolerate therapeutic trials) goal revised this date  -AC         (STG) Lingual Strengthening Goal 1 (SLP)    Activity (Lingual Strengthening Goal 1, SLP) increase tongue back strength  -AC      Increase Tongue Back Strength lingual resistance exercises  -AC      [place]/Accuracy (Lingual Strengthening Goal 1, SLP) with minimal cues (75-90% accuracy)  -AC      Time Frame (Lingual Strengthening Goal 1, SLP) short term goal (STG)  -AC      Progress/Outcomes (Lingual Strengthening Goal 1, SLP) goal ongoing  -AC         (STG) Pharyngeal Strengthening Exercise Goal 1 (SLP)    Increase Timing prepping - 3 second prep or suck swallow or 3-step swallow  -AC      Increase Superior Movement of the Hyolaryngeal Complex effortful pitch glide (falsetto + pharyngeal squeeze)  -AC      Increase Anterior Movement of the Hyolaryngeal Complex chin tuck against resistance (CTAR)  -AC      Increase Closure at Entrance to Airway/Closure of Airway at Glottis --  hold for now given respiratory status  -AC      [place]/Accuracy (Pharyngeal Strengthening Goal 1, SLP) with minimal cues (75-90% accuracy)  -AC      Time Frame (Pharyngeal Strengthening Goal 1, SLP) short term goal (STG)  -AC      Progress/Outcomes (Pharyngeal Strengthening Goal 1, SLP) goal revised this date  -AC         (STG) Swallow Management Recall Goal 1 (SLP)    Activity (Swallow Management Recall Goal 1, SLP) recall of;compensatory swallow strategies/techniques  -AC      [place]em/Accuracy (Swallow Management Recall Goal 1, SLP)  with minimal cues (75-90% accuracy)  -AC      Time Frame (Swallow Management Recall Goal 1, SLP) short term goal (STG)  -AC      Progress/Outcomes (Swallow Management Recall Goal 1, SLP) goal no longer appropriate  -AC         (STG) Swallow Compensatory Strategies Goal 1 (SLP)    Activity (Swallow Compensatory Strategies/Techniques Goal 1, SLP) liquids by teaspoon only;during p.o. trials;during meal intake  -AC      [place]/Accuracy (Swallow Compensatory Strategies/Techniques Goal 1, SLP) with minimal cues (75-90% accuracy)  -AC      Time Frame (Swallow Compensatory Strategies/Techniques Goal 1, SLP) short term goal (STG)  -AC      Progress/Outcomes (Swallow Compensatory Strategies/Techniques Goal 1, SLP) goal no longer appropriate  -AC            User Key  (r) = Recorded By, (t) = Taken By, (c) = Cosigned By    Initials Name Provider Type    Sophia Torres MS CCC-SLP Speech and Language Pathologist                   Time Calculation:    Time Calculation- SLP     Row Name 03/28/23 1206             Time Calculation- SLP    SLP Start Time 1030  -AC      SLP Received On 03/28/23  -AC         Untimed Charges    45329-PT Fiberoptic Endo Eval Swallow Minutes 115  -AC         Total Minutes    Untimed Charges Total Minutes 115  -AC       Total Minutes 115  -AC            User Key  (r) = Recorded By, (t) = Taken By, (c) = Cosigned By    Initials Name Provider Type    Sophia Torres MS CCC-SLP Speech and Language Pathologist                Therapy Charges for Today     Code Description Service Date Service Provider Modifiers Qty    [id number]  ST FIBEROPTIC ENDO EVAL SWALL 8 3/28/2023 Sophia Mckay MS CCC-SLP GN 1               Sophia Mckay MS CCC-RONY  3/28/2023

## 2023-03-28 NOTE — CONSULTS
Clinical Nutrition     Nutrition Support Assessment  Reason for Visit: MDR, Identified at risk by screening criteria, Re-assessment, Nurse practioner/physician assistant consult, EN      Patient Name: Sharmila Delarosa  YOB: 1950  MRN: [id number]  Date of Encounter: 03/28/23 08:30 EDT  Admission date: 3/21/2023    Comments: Pt not tolerating EN support w/ significant diarrhea reported, advancement held, tip of [place] tube at LOT per KUB.  EN adjusted to  Peptamen 1.5 goal rate of 50 ml/hr, daily goal volume of 1000 mL w/ 1 ProSource provided daily, Water flushes increased d/t hypernatremia. RD will monitor tolerance and adequacy of delivery as well as GOC w/ ongoing discussions.    Nutrition Assessment   Admission Diagnosis:  Acute respiratory failure with hypoxia and hypercarbia (HCC) [J96.01, J96.02]      Problem List:    AoC respiratory failure with hypoxia and hypercapnia    Stage 4 very severe COPD by GOLD classification    Bronchiectasis    Pulmonary nodule    RA (rheumatoid arthritis)    On chronic immunosuppression therapy    JORGITO    GERD    UTI (urinary tract infection)    Acute respiratory failure with hypoxia and hypercarbia (HCC)    Anemia    Atrial fibrillation with RVR (HCC)    Dysphagia        PMH:  She  has a past medical history of Anxiety, Breast cancer (HCC) (2015), Depression, Drug therapy, GERD (3/21/2023), radiation therapy, On chronic immunosuppression therapy (3/21/2023), and RA (rheumatoid arthritis) (3/21/2023).    PSH: She  has a past surgical history that includes Breast lumpectomy and Breast biopsy (Right, 2015).      Applicable Nutrition Concerns:   Skin:  Oral:  GI:      Applicable Interval History:   (3/23) SLP eval - NPO  (3/24) SLP FEES - soft textures/chopped meats/nectar thick liquids;  Small bore NG tube placed for EN  Per KUB at LOT    Reported/Observed/Food/Nutrition Related History:   3/27 RN reports pt has not stopped having diarrhea since  tube feeding started, NP held it at current rate of 50 ml/hr. Pt is till requiring BiAP does have periods of tolerating HFNC, amio restarted by cardiology. SLP to see for FEES.    RN reports patient had PO diet initiated yesterday, but whenever pt takes a bit or sip of something she coughs and her oxygen sats decrease. Pt to be NPO for now. A small bore feeding tube was placed yesterday and EN will be initiated today (pt in agreement per palliative care notes). Note NKFA.    Pt has been requiring both Bipap and HFNC. RD recommended feeding tube be advanced to post-pyloric given use of Bipap. If unable to advance, will need to hold EN during Bipap use.      Labs    Labs Reviewed: Yes elevated BG and NA+ noted    Results from last 7 days   Lab Units 03/28/23  0527 03/27/23 0423 03/26/23  1828 03/26/23  0811 03/24/23  0641 03/23/23  0229 03/22/23  0616 03/21/23 2129   GLUCOSE mg/dL 220* 416*  --  212*   < > 112*   < > 164*   BUN mg/dL 40* 42*  --  40*   < > 35*   < > 24*   CREATININE mg/dL 0.86 1.02*  --  0.84   < > 1.10*   < > 1.23*   SODIUM mmol/L 148* 142  --  150*   < > 146*   < > 145   CHLORIDE mmol/L 108* 100  --  106   < > 99   < > 88*   POTASSIUM mmol/L 4.8 4.5 5.0 3.6   < > 4.2   < > 3.1*   PHOSPHORUS mg/dL 2.5 2.0*  2.0*  --  2.6   < > 3.6   < >  --    MAGNESIUM mg/dL 1.9 2.2  2.2  --  1.6   < > 2.1   < > 0.8*   ALT (SGPT) U/L  --  15  --   --   --  16  --  9    < > = values in this interval not displayed.       Results from last 7 days   Lab Units 03/28/23  0527 03/27/23  0423 03/26/23  0811 03/23/23  0229 03/21/23 2129   ALBUMIN g/dL  --  2.9*  --  3.0* 3.2*   IONIZED CALCIUM mmol/L 1.32 1.17 1.09*  --   --    CHOLESTEROL mg/dL  --  148  --   --   --    TRIGLYCERIDES mg/dL  --  267*  --   --   --        Results from last 7 days   Lab Units 03/28/23  0510 03/27/23  2252 03/27/23  1735 03/27/23  1114 03/27/23  0529 03/26/23  2322   GLUCOSE mg/dL 212* 219* 155* 167* 249* 198*     No results found for:  "HGBA1C      Results from last 7 days   Lab Units 03/21/23 2129   PROBNP pg/mL 3,834.0*       Medications    Medications Reviewed: Yes  Pertinent  Scheduled: protonix, antibiotic, solu-medrol, insulin  Infusion: NS @ 75 ml/hr  PRN:     Intake/Ouptut 24 hrs (0701 - 0700)   I&O's Reviewed: Yes   Intake & Output (last day)       03/27 0701 03/28 0700 03/28 0701 03/29 0700    I.V. (mL/kg) 2120.3 (33.1) 125 (2)    Other 975 50    NG/GT 1795 81    IV Piggyback 104.1 20.8    Total Intake(mL/kg) 4994.4 (78) 276.8 (4.3)    Urine (mL/kg/hr) 600 (0.4)     Stool 300     Total Output 900     Net +4094.4 +276.8          Urine Unmeasured Occurrence 2 x     Stool Unmeasured Occurrence 4 x         Anthropometrics     Admission Height 167.6 cm (66\") Documented at 03/21/2023 2138   Admission Weight 59 kg (130 lb) Documented at 03/21/2023 2138          Height: Height: 167.6 cm (66\")  Last Filed Weight: Weight: 64 kg (141 lb 1.5 oz) (03/26/23 0342)  Method: Weight Method: Bed scale  BMI: BMI (Calculated): 22.8  BMI classification: Normal: 18.5-24.9kg/m2    Weight Weight (kg) Weight (lbs) Weight Method VISIT REPORT   3/26/2023 64 kg 141 lb 1.5 oz Bed scale -   3/25/2023 63.7 kg 140 lb 6.9 oz Bed scale -   3/24/2023 63.2 kg 139 lb 5.3 oz Bed scale -   3/24/2023 61.2 kg 134 lb 14.7 oz Bed scale -   3/23/2023 61 kg 134 lb 7.7 oz Bed scale -   3/23/2023 59.421 kg 131 lb - -   3/23/2023 59.7 kg 131 lb 9.8 oz Bed scale -   3/21/2023 58.968 kg 130 lb Estimated -     UBW:  Weight Change:  Nutrition Focused Physical Exam     Date:   Unable to perform exam due to: Potential for patient discomfort, Defer pending indication, d/t respiratory status *     Needs Assessment   Date: 3/25, 3/27    Height used: 167.6 cm  Weights used: 64 kg      Estimated Calorie needs: ~ 1700 calories daily  Method: 12-25 Kcals/KG  For critical illness = 768-1600 kcal/kg  Method:  MSJ = 1177 x 1.25 = 1471    Estimated Protein needs: ~83 g protein daily (1.3 " gm/kg)  Method: 1.2-1.5 g/Kg =  64-96 kg    Estimated Fluid needs:  To correct hypernatremia    Current Nutrition Prescription     PO: NPO Diet NPO Type: Tube Feeding    Intake: Insufficient data NPO since 3/22      Nutrition Diagnosis     Date: 3/27 Updated:   Problem Inadequate energy intake/protein intake   Etiology A/C Resp failure/ stage 4 COPD   Signs/Symptoms HFNC/BiPAP/NPO , failed FEES   Status: ongoing EN support    Goal:   General: Nutrition support treatment, Palliative care  PO: Advace diet as medically feasible/appropriate  EN/PN: Establish EN tolerance, Tolerate EN at goal, Adjust EN    Nutrition Intervention      Follow treatment progress, Care plan reviewed, Nutrition support order placed    EN: Peptamen 1.5  Goal Rate: 50 ml/hr  Water Flushes: 50 ml/hr  Modular: Prosource -no carb 1/day  Route: ND  Tube: Small bore    At goal over: 20Hrs/day    Rx will supply:   Goal Volume 1000 mL/day     Flush Volume 1000 mL/day     Energy 1560 Kcal/day 98 % Est Need   Protein 83 g/day 100 % Est Need   Fiber 0 g/day     Water in   mL     Total Water 1770 mL     Meet DRI Yes          Monitoring/Evaluation:   Per protocol, I&O, Pertinent labs, EN delivery/tolerance, Weight, Symptoms, POC/GOC, Swallow function      Ida Villeda, MS,RD,LD  Time Spent: 45 min

## 2023-03-28 NOTE — PROGRESS NOTES
[facility] Cardiology  Inpatient Progress Note      Chief Complaint/Reason for consult: PAF         Subjective  Afebrile overnight, denies dyspnea or pain. Asking about going home today.        Vital Sign Min/Max for last 24 hours  Temp  Min: 97.7 °F (36.5 °C)  Max: 98.4 °F (36.9 °C)   BP  Min: 92/67  Max: 133/83   Pulse  Min: 62  Max: 79   Resp  Min: 16  Max: 24   SpO2  Min: 69 %  Max: 100 %   Flow (L/min)  Min: 45  Max: 50      Intake/Output Summary (Last 24 hours) at 3/28/2023 0932  Last data filed at 3/28/2023 0800  Gross per 24 hour   Intake 3995.57 ml   Output 900 ml   Net 3095.57 ml           Gen: well developed, white female, lying in bed, comfortable.  HEENT: MMM, sclerae anicteric, conjunctivae normal  CV: regular rate, regular rhythm, III/VI systolic murmur at base, normal S1, S2. 2+ radial pulses  Pulm: BiPAP, normal work of breathing, scattered rhonchi  Abd: soft, nontender, nondistended  Ext: normal bulk for age, normal tone, no dependent edema  Neuro: Awake and alert, face symmetrical, upper extremities in restraints     Tele:  2 short episodes of afib with RVR up to 140s early this am    Results Review (reviewed the patient's recent labs in the electronic medical record):     EKG:  3/27 - NSR, normal ECG, normal QT/QTc     Radiology:   XR Chest 1 View    Result Date: 3/28/2023  Impression: No new chest disease. Electronically Signed: Nathanael Macias  3/28/2023 8:49 AM EDT  Workstation ID: [id number]    XR Chest 1 View    Result Date: 3/27/2023  Impression: Bilateral basilar atelectasis. Small bilateral pleural effusions. Stable chest. Electronically Signed: Carlton Ralph  3/27/2023 8:29 AM EDT  Workstation ID: [id number]    3/23/23 - Transthoracic Echo Complete   •  Left ventricular ejection fraction appears to be 51 - 55%.  •  Left ventricular diastolic function is consistent with (grade I) impaired relaxation.  •  Moderate tricuspid valve regurgitation is present. Estimated right ventricular  systolic pressure from tricuspid regurgitation is markedly elevated (>55 mmHg).  •  The hepatic veins are dilated consistent with elevated right atrial pressure.  •  There is moderate thickening of the aortic valve. The aortic valve appears trileaflet. Trace aortic valve regurgitation is present. Mild aortic valve stenosis is present.     Lab Results   Component Value Date    WBC 11.37 (H) 03/28/2023    HGB 8.3 (L) 03/28/2023    HCT 30.9 (L) 03/28/2023    .7 (H) 03/28/2023     (L) 03/28/2023     Lab Results   Component Value Date    GLUCOSE 220 (H) 03/28/2023    CALCIUM 8.5 (L) 03/28/2023     (H) 03/28/2023    K 4.8 03/28/2023    CO2 37.0 (H) 03/28/2023     (H) 03/28/2023    BUN 40 (H) 03/28/2023    CREATININE 0.86 03/28/2023    BCR 46.5 (H) 03/28/2023    ANIONGAP 3.0 (L) 03/28/2023     Assessment     Paroxysmal atrial fibrillation, currently sinus rhythm   - transitioned to amiodarone per  mg twice daily   - continue metoprolol   - no AC with anemia    Palliative care following, ongoing discussions with patient's family regarding treatment goals. Currently DNR/DNI with family considering comfort-focused care and have discussed hospice referral.     MAMTA Gomez MD  3/28/2023  09:32 EDT

## 2023-03-28 NOTE — PROGRESS NOTES
Daily chart review complete.  Patient resting on bipap at time of rounding.  No questions or concerns at this time.  NN continues to follow.      Per current GOLD Standards, please consider:  LAMA in place (Spiriva Respimat), No LABA/ICS in place, Outpatient PFT, Rehab as appropriate, Palliative Care consult,  Annual LDCT per current screening guidelines (age 50-80 years old, smoking history of 20 pack years or more or has quit within past 15 years)      Patient has been scheduled for a hospital follow up with [facility] for 4/19/2023 @ 2:30 pm with DINO Mendiola.

## 2023-03-28 NOTE — PROGRESS NOTES
"Palliative Care Daily Progress Note     C/C: Patient sleeping.     S: Medical record reviewed. Follow up visit for GOC in the context of complex medical decision making. Events noted. Currently on HFNC at 40% FiO2. Patient sleeping. RN reports patient wore BiPAP overnight and currently on HFNC. Patient to have FEES today. Son requesting call and reports he wants to honor his mother's request in his LW/ACP to seek a second opinion from outside hospital physician.     ROS: +SOA, currently on BiPAP. +cough. +fatigue, due to SOA. +debility. +diarrhea, FMS in place. +pain, due to skin excoriation due to diarrhea. Denies anxiety, N/V.      O: Code Status:   Code Status and Medical Interventions:   Ordered at: 03/24/23 1156     Medical Intervention Limits:    NO intubation (DNI)     Level Of Support Discussed With:    Patient     Code Status (Patient has no pulse and is not breathing):    No CPR (Do Not Attempt to Resuscitate)     Medical Interventions (Patient has pulse or is breathing):    Limited Support     Release to patient:    Routine Release      Advanced Directives: Advance Directive Status: Patient has advance directive, copy in chart   Goals of Care: Ongoing.   Palliative Performance Scale Score: 30%     /54   Pulse 80   Temp 97.7 °F (36.5 °C) (Axillary)   Resp 28   Ht 167.6 cm (66\")   Wt 64 kg (141 lb 1.5 oz)   SpO2 96%   BMI 22.77 kg/m²     Intake/Output Summary (Last 24 hours) at 3/28/2023 1238  Last data filed at 3/28/2023 0800  Gross per 24 hour   Intake 3280.87 ml   Output 900 ml   Net 2380.87 ml       PE:  General Appearance:    Patient laying in bed, A/C ill appearing, frail,sleeping, NAD   HEENT:    NC/AT,  MM dry, HFNC in place, keofeed in place   Neck:   supple, trachea midline, no JVD   Lungs:      diminished in bases; WOB increased with speaking and at rest; RR 22-24 on exam, HFNC 40% FiO2    Heart:    RRR, normal S1 and S2, no M/R/G, HR 80 on monitor   Abdomen:     Normal bowel sounds, " soft, nontender, nondistended   G/U:   Deferred   MSK/Extremities:   Wasting, no edema   Pulses:   Pulses palpable and equal bilaterally   Skin:   Warm, dry   Neurologic:   sleeping   Psych:   sleeping       Meds: Reviewed and changes noted    Labs:   Results from last 7 days   Lab Units 03/28/23 0527   WBC 10*3/mm3 11.37*   HEMOGLOBIN g/dL 8.3*   HEMATOCRIT % 30.9*   PLATELETS 10*3/mm3 114*     Results from last 7 days   Lab Units 03/28/23 0527   SODIUM mmol/L 148*   POTASSIUM mmol/L 4.8   CHLORIDE mmol/L 108*   CO2 mmol/L 37.0*   BUN mg/dL 40*   CREATININE mg/dL 0.86   GLUCOSE mg/dL 220*   CALCIUM mg/dL 8.5*     Results from last 7 days   Lab Units 03/28/23 0527 03/27/23  0423   SODIUM mmol/L 148* 142   POTASSIUM mmol/L 4.8 4.5   CHLORIDE mmol/L 108* 100   CO2 mmol/L 37.0* 33.0*   BUN mg/dL 40* 42*   CREATININE mg/dL 0.86 1.02*   CALCIUM mg/dL 8.5* 7.9*   BILIRUBIN mg/dL  --  0.2   ALK PHOS U/L  --  43   ALT (SGPT) U/L  --  15   AST (SGOT) U/L  --  12   GLUCOSE mg/dL 220* 416*     Imaging Results (Last 72 Hours)     Procedure Component Value Units Date/Time    SLP FEES - Fiberoptic Endo Eval Swallow [[id number]] Resulted: 03/28/23 1130     Updated: 03/28/23 1130    Narrative:      This procedure was auto-finalized with no dictation required.    XR Chest 1 View [[id number]] Collected: 03/28/23 0845     Updated: 03/28/23 0852    Narrative:      XR CHEST 1 VW    Date of Exam: 3/28/2023 4:28 AM EDT    Indication: respiratory failure.    Comparison: 3/27/2023    Findings:  PICC line tip remains in the proximal SVC. Feeding tube is seen below the left hemidiaphragm. Heart is in the upper range of normal size. The vasculature appears mildly cephalized, but stable. Skinfold shadows are superimposed over both right and left   lateral chest, and allowing for this, no pneumothorax is seen. Mild bibasilar interstitial changes are stable.      Impression:      Impression:  No new chest disease.    Electronically Signed: Nathanael  Gilberto    3/28/2023 8:49 AM EDT    Workstation ID: [id number]    XR Chest 1 View [[id number]] Collected: 03/27/23 0827     Updated: 03/27/23 0832    Narrative:      XR CHEST 1 VW    Date of Exam: 3/27/2023 5:10 AM EDT    Indication: respiratory failure.    Comparison: March 26, 2023    Findings:  There is a feeding tube in the stomach. There is a right-sided PICC line with tip in superior vena cava. There are small bilateral pleural effusions. There is bilateral basilar atelectasis.      Impression:      Impression:  Bilateral basilar atelectasis. Small bilateral pleural effusions. Stable chest.    Electronically Signed: Carlton Ralph    3/27/2023 8:29 AM EDT    Workstation ID: [id number]    XR Chest 1 View [[id number]] Collected: 03/26/23 0126     Updated: 03/26/23 0331    Narrative:      EXAMINATION: XR CHEST 1 VW    DATE: 3/26/2023 2:23 AM    INDICATION:  Possible right pneumothorax on radiograph of the abdomen;    COMPARISON:  Chest radiograph March 25, 2023 and abdominal radiograph March 26, 2023    FINDINGS:  Right arm PICC unchanged. Enteric tube tip past GE junction and off image.    Small bilateral pleural effusions redemonstrated.    Diffuse pulmonary vascular interstitial prominence unchanged.    Cardiomediastinal silhouette  unchanged.    No evidence of pneumothorax.          Impression:        1.  No evidence of pneumothorax. Finding on abdominal radiograph was likely artifactual.  2.  No significant change since chest radiograph yesterday.          Electronically signed by:  Davi Valentine M.D.    3/26/2023 1:30 AM Mountain Time    XR Abdomen KUB [[id number]] Collected: 03/25/23 2325     Updated: 03/26/23 0155    Narrative:      Supine abdomen.    DATE: 3/26/2023.    COMPARISON: 3/25/2023.    CLINICAL HISTORY: NG tube adjustment.      Impression:        NG/Dobbhoff tube projects overlying the mid abdomen and possibly within the horizontal portion of the duodenum and near the ligament of Treitz given the  course.    There appears to be a pneumothorax on the right at the visualized portion of the lung bases. This was not seen on the prior study. Recommend dedicated chest imaging for further characterization.    Patchy parenchymal changes are otherwise seen at the visualized lung bases. These are unchanged.    CRITICAL RESULTS NOTIFICATION: Results were called to nurse practitioner Dane Bell at 11:53 PM [place] standard time on 3/25/2023..    Electronically signed by:  Otilio Reddy D.O.    3/25/2023 11:54 PM Mountain Time    XR Abdomen KUB [[id number]] Collected: 03/25/23 1931     Updated: 03/25/23 2133    Narrative:      EXAMINATION: XR ABDOMEN KUB      DATE OF EXAM: 3/25/2023 8:48 PM    HISTORY: Keofeed adjusted    COMPARISON: 2:08 PM same day.        Impression:        1.  Weighted enteric tube retracted with tip in the distal stomach and directed antegrade.  2.  No other interval change.     Electronically signed by:  Davi Valentine M.D.    3/25/2023 7:32 PM Mountain Time    XR Abdomen KUB [[id number]] Collected: 03/25/23 1438     Updated: 03/25/23 1443    Narrative:      XR ABDOMEN KUB    Date of Exam: 3/25/2023 2:10 PM EDT    Indication: keofeed placement.    Comparison: March 24, 2023    Findings:  Enteric tube is noted with its tip by location in probably the distal duodenum. Exact location however difficult to confirm without contrast. There are bibasilar effusions as well as visualized of the lower lungs. There is some atelectasis or infiltrate   at the right base.      Impression:      Impression:  1.Enteric tube by location could be in the distal duodenum. Without contrast one cannot definitely confirm location.  2.Bibasilar effusions and what looks like infiltrate right base.    Electronically Signed: Dinh Walters    3/25/2023 2:40 PM EDT    Workstation ID: [id number]                Diagnostics: Reviewed    A:   AoC respiratory failure with hypoxia and hypercapnia    Stage 4 very severe COPD by GOLD  classification    Bronchiectasis    Pulmonary nodule    RA (rheumatoid arthritis)    On chronic immunosuppression therapy    JORGITO    GERD    UTI (urinary tract infection)    Acute respiratory failure with hypoxia and hypercarbia (HCC)    Anemia    Atrial fibrillation with RVR (HCC)    Dysphagia     72 y.o. female with COPD, bronchiectasis, JORGITO, GERD, PNA, UTI.      S/S:   1. Dyspnea -currently on BiPAP/HFNC  -continue nebs, steroids, ABX  -recommend Morphine 5mg PO q 3 hours prn dyspnea     2. Dysphagia -currently on TF, speech re-eval pending  -son reports would not pursue PEG     3. Debility -recommend PT/OT evaluation if respiratory status allows     4. GOC -DNR/DNI -per discussion with patient and phone call with son  -called and spoke to Christian leung, at 11:59am, and he would like to honor LW/ACP and seek second opinion from outside hospital, son in agreement with patient's PCP seeing patient to review chart and make recommendations  -secure Epic chat sent to Dr. Gomez, PCP with request to review patient's chart/make recommendations    P: Patient sleeping at time of visit. Spoke with sonChristian, on phone, and he is considering comfort focused plan of care but would like to honor LW/ACP request of outside opinion regarding patient's condition. He is agreeable to Palliative reaching out to patient's PCP for request of opinion. PCP sent message through [employer]. All questions and concerns addressed.   Palliative Care Team will continue to follow patient. Please do not hesitate to contact us regarding further sx mgmt or GOC needs.  Makayla Roper, APRN  3/28/2023  Time spent: 35 minutes

## 2023-03-29 PROBLEM — N17.9 AKI (ACUTE KIDNEY INJURY): Status: RESOLVED | Noted: 2023-01-01 | Resolved: 2023-01-01

## 2023-03-29 PROBLEM — J18.9 PNEUMONIA OF RIGHT UPPER LOBE DUE TO INFECTIOUS ORGANISM: Status: ACTIVE | Noted: 2023-01-01

## 2023-03-29 NOTE — PROGRESS NOTES
"Palliative Care Daily Progress Note     C/C: Patient reports SOA, fatigue.     S: Medical record reviewed. Follow up visit for GOC in the context of complex medical decision making. Events noted. Patient on 4LNC at time of visit, tolerating well. Pursed lip breathing with conversation and requires frequent breaks. Patient failed repeated FEES yesterday, TF continues. Patient in and out of A-fib. Met with son, daughter, and patient's two sisters in room with patient. Patient awake and alert during conversations, appropriate.     ROS: +SOA, currently on 4LNC. +cough. +fatigue, due to SOA. +debility. +diarrhea, FMS in place. +pain, due to skin excoriation due to diarrhea. Denies anxiety, N/V.      O: Code Status:   Code Status and Medical Interventions:   Ordered at: 03/24/23 1156     Medical Intervention Limits:    NO intubation (DNI)     Level Of Support Discussed With:    Patient     Code Status (Patient has no pulse and is not breathing):    No CPR (Do Not Attempt to Resuscitate)     Medical Interventions (Patient has pulse or is breathing):    Limited Support     Release to patient:    Routine Release      Advanced Directives: Advance Directive Status: Patient has advance directive, copy in chart   Goals of Care: Ongoing.   Palliative Performance Scale Score: 30%     /72   Pulse 70   Temp 98 °F (36.7 °C) (Axillary)   Resp 26   Ht 167.6 cm (66\")   Wt 67.5 kg (148 lb 13 oz)   SpO2 98%   BMI 24.02 kg/m²     Intake/Output Summary (Last 24 hours) at 3/29/2023 1153  Last data filed at 3/29/2023 0546  Gross per 24 hour   Intake 2244.4 ml   Output 1000 ml   Net 1244.4 ml       PE:  General Appearance:    Patient laying in bed, A/C ill appearing, frail, NAD   HEENT:    NC/AT,  MM dry, NC in place, keofeed in place   Neck:   supple, trachea midline, no JVD   Lungs:      diminished in bases; WOB increased with speaking and at rest; RR 22-24 on exam, NC 4LNC    Heart:    RRR, normal S1 and S2, no M/R/G,  on " monitor   Abdomen:     Normal bowel sounds, soft, nontender, nondistended   G/U:   Deferred   MSK/Extremities:   Wasting, no edema   Pulses:   Pulses palpable and equal bilaterally   Skin:   Warm, dry   Neurologic:   A/O x3, cooperative,    Psych:   calm, appropriate       Meds: Reviewed and changes noted    Labs:   Results from last 7 days   Lab Units 03/29/23  0134   WBC 10*3/mm3 10.85*   HEMOGLOBIN g/dL 8.9*   HEMATOCRIT % 33.1*   PLATELETS 10*3/mm3 116*     Results from last 7 days   Lab Units 03/29/23  0200   SODIUM mmol/L 147*   POTASSIUM mmol/L 4.5   CHLORIDE mmol/L 107   CO2 mmol/L 34.0*   BUN mg/dL 36*   CREATININE mg/dL 0.86   GLUCOSE mg/dL 134*   CALCIUM mg/dL 8.9     Results from last 7 days   Lab Units 03/29/23  0200 03/28/23  0527 03/27/23  0423   SODIUM mmol/L 147*   < > 142   POTASSIUM mmol/L 4.5   < > 4.5   CHLORIDE mmol/L 107   < > 100   CO2 mmol/L 34.0*   < > 33.0*   BUN mg/dL 36*   < > 42*   CREATININE mg/dL 0.86   < > 1.02*   CALCIUM mg/dL 8.9   < > 7.9*   BILIRUBIN mg/dL  --   --  0.2   ALK PHOS U/L  --   --  43   ALT (SGPT) U/L  --   --  15   AST (SGOT) U/L  --   --  12   GLUCOSE mg/dL 134*   < > 416*    < > = values in this interval not displayed.     Imaging Results (Last 72 Hours)     Procedure Component Value Units Date/Time    SLP FEES - Fiberoptic Endo Eval Swallow [[id number]] Resulted: 03/28/23 1130     Updated: 03/28/23 1130    Narrative:      This procedure was auto-finalized with no dictation required.    XR Chest 1 View [[id number]] Collected: 03/28/23 0845     Updated: 03/28/23 0852    Narrative:      XR CHEST 1 VW    Date of Exam: 3/28/2023 4:28 AM EDT    Indication: respiratory failure.    Comparison: 3/27/2023    Findings:  PICC line tip remains in the proximal SVC. Feeding tube is seen below the left hemidiaphragm. Heart is in the upper range of normal size. The vasculature appears mildly cephalized, but stable. Skinfold shadows are superimposed over both right and left    lateral chest, and allowing for this, no pneumothorax is seen. Mild bibasilar interstitial changes are stable.      Impression:      Impression:  No new chest disease.    Electronically Signed: Nathanael Gravesd    3/28/2023 8:49 AM EDT    Workstation ID: [id number]    XR Chest 1 View [[id number]] Collected: 03/27/23 0827     Updated: 03/27/23 0832    Narrative:      XR CHEST 1 VW    Date of Exam: 3/27/2023 5:10 AM EDT    Indication: respiratory failure.    Comparison: March 26, 2023    Findings:  There is a feeding tube in the stomach. There is a right-sided PICC line with tip in superior vena cava. There are small bilateral pleural effusions. There is bilateral basilar atelectasis.      Impression:      Impression:  Bilateral basilar atelectasis. Small bilateral pleural effusions. Stable chest.    Electronically Signed: Carlton Ralph    3/27/2023 8:29 AM EDT    Workstation ID: [id number]                Diagnostics: Reviewed    A:   AoC respiratory failure with hypoxia and hypercapnia    Stage 4 very severe COPD by GOLD classification    Bronchiectasis    Pulmonary nodule    RA (rheumatoid arthritis)    On chronic immunosuppression therapy    JORGITO    GERD    UTI (urinary tract infection)    Acute respiratory failure with hypoxia and hypercarbia (HCC)    Anemia    Atrial fibrillation with RVR (HCC)    Dysphagia     72 y.o. female with COPD, bronchiectasis, JORGITO, GERD, PNA, UTI.      S/S:   1. Dyspnea -currently on NC  -continue nebs, steroids, ABX  -recommend Morphine 5mg PO q 3 hours prn dyspnea     2. Dysphagia -currently on TF, speech re-eval with continued NPO recommendation  -discussed comfort diet versus PEG     3. Debility -recommend PT/OT evaluation if respiratory status allows     4. GOC -DNR/DNI -per discussion with patient   -met with patient, son, daughter, and patient's two sisters to discuss patient's condition and options for continued full treatment versus comfort focused plan of care, discussed comfort diet versus  PEG  -secure Epic chat sent to Dr. Gomez yesterday, PCP with request to review patient's chart/make recommendations per family request, no reply at this time    P: Patient alert and oriented today, requesting family meeting to discuss GOC. Met with patient and family to discuss patient's condition and upcoming decisions including comfort diet versus PEG, continued Full Treatment versus comfort focused plan of care. Patient and family verbalizes understanding of overall condition. Patient is wanting to know if Oncology could be consulted to ensure pulmonary nodule not treatable. Did discuss that patient's functional status would greatly hinder any treatment options. Patient and family considering options. Emotional support provided throughout conversation. Discussed hospice services as well.    Palliative Care Team will continue to follow patient. Please do not hesitate to contact us regarding further sx mgmt or GOC needs.  Makayla Roper, APRN  3/29/2023  Time spent: 35 minutes

## 2023-03-29 NOTE — CASE MANAGEMENT/SOCIAL WORK
Continued Stay Note  [facility]     Patient Name: Sharmila Delarosa  MRN: [id number]  Today's Date: 3/29/2023    Admit Date: 3/21/2023    Plan: Ongoing   Discharge Plan     Row Name 03/29/23 1612       Plan    Plan Ongoing    Plan Comments [facility] met with patient and family at bedside. Family denied any discharge planning needs at this time. Family explained they are still deciding on GOC. Palliative following. CM continues to following for discharge planning needs               Discharge Codes    No documentation.               Expected Discharge Date and Time     Expected Discharge Date Expected Discharge Time    Mar 31, 2023             Marce Ferrell) CANDIDO Bhakta

## 2023-03-29 NOTE — PROGRESS NOTES
NN spoke with pt at BS.  Pt alert and able to answer questions appropriately. Pt O2 sat 98% on 40% bipap currently, home O2 use 2 L. Deep breathing exercises encouraged. Patient currently with tube feedings and pallaitive following.  No new concerns or questions voiced at this time.  NN will continue to follow as needed.       Per current GOLD Standards, please consider:  LAMA in place (Spiriva Respimat), No LABA/ICS in place, Outpatient PFT, Rehab as appropriate, Palliative Care consult,  Annual LDCT per current screening guidelines (age 50-80 years old, smoking history of 20 pack years or more or has quit within past 15 years)      Patient has been scheduled for a hospital follow up with [facility] for 4/19/2023 @ 2:30 pm with DINO Mendiola.

## 2023-03-29 NOTE — PLAN OF CARE
Problem: Restraint, Nonviolent  Goal: Absence of Harm or Injury  Outcome: Ongoing, Progressing  Intervention: Implement Least Restrictive Safety Strategies  Recent Flowsheet Documentation  Taken 3/29/2023 0600 by Apurva Navarro RN  Medical Device Protection: tubing secured  Less Restrictive Alternative:  • calming techniques promoted  • coping techniques promoted  De-Escalation Techniques: stimulation decreased  Diversional Activities: television  Taken 3/29/2023 0400 by Apurva Navarro RN  Medical Device Protection: tubing secured  Less Restrictive Alternative:  • calming techniques promoted  • coping techniques promoted  De-Escalation Techniques: stimulation decreased  Diversional Activities: television  Taken 3/29/2023 0200 by Apurva Navarro RN  Medical Device Protection: tubing secured  Less Restrictive Alternative:  • calming techniques promoted  • coping techniques promoted  De-Escalation Techniques: stimulation decreased  Diversional Activities: television  Taken 3/29/2023 0057 by Apurva Navarro RN  Medical Device Protection: tubing secured  Less Restrictive Alternative:  • calming techniques promoted  • coping techniques promoted  De-Escalation Techniques: stimulation decreased  Diversional Activities: television  Taken 3/29/2023 0000 by Apurva Navarro RN  Medical Device Protection: tubing secured  Less Restrictive Alternative:  • calming techniques promoted  • coping techniques promoted  De-Escalation Techniques: stimulation decreased  Diversional Activities: television  Taken 3/28/2023 2200 by Apurva Navarro RN  Medical Device Protection: tubing secured  Less Restrictive Alternative:  • calming techniques promoted  • coping techniques promoted  De-Escalation Techniques:  • reoriented  • verbally redirected  Diversional Activities: television  Taken 3/28/2023 2000 by Apurva Navarro RN  Medical Device Protection: tubing secured  Less Restrictive Alternative:  • calming techniques  promoted  • coping techniques promoted  De-Escalation Techniques: reoriented  Diversional Activities: television  Intervention: Protect Dignity, Rights, and Personal Wellbeing  Recent Flowsheet Documentation  Taken 3/28/2023 2000 by Apurva Navarro RN  Trust Relationship/Rapport:  • care explained  • choices provided  • thoughts/feelings acknowledged  Intervention: Protect Skin and Joint Integrity  Recent Flowsheet Documentation  Taken 3/29/2023 0600 by Apurva Navarro RN  Body Position:  • turned  • right  Taken 3/29/2023 0400 by Apurva Navarro RN  Body Position:  • turned  • left  Taken 3/29/2023 0200 by Apurva Navarro RN  Body Position:  • turned  • right  Taken 3/29/2023 0000 by Apurva Navarro RN  Body Position:  • turned  • left  Taken 3/28/2023 2200 by Apurva Navarro RN  Body Position:  • turned  • right  Taken 3/28/2023 2000 by Apurva Navarro RN  Body Position: position changed independently   Goal Outcome Evaluation:   Patient rested well overnight, disoriented to time and occasinally place. FMS in place. Bipap tolerated overnight. Patient in afibrvr overnight, 5mg iv metoprolol given x2, mag replaced.

## 2023-03-29 NOTE — PROGRESS NOTES
[facility]  Inpatient Progress Note      Chief Complaint/Reason for consult: PAF         Subjective  Afebrile overnight, denies dyspnea on BiPAP this morning, denies chest pain. Did have multiple episodes of PAF overnight       Vital Sign Min/Max for last 24 hours  Temp  Min: 97 °F (36.1 °C)  Max: 98.3 °F (36.8 °C)   BP  Min: 95/44  Max: 137/76   Pulse  Min: 63  Max: 121   Resp  Min: 20  Max: 28   SpO2  Min: 85 %  Max: 100 %   Flow (L/min)  Min: 50  Max: 55      Intake/Output Summary (Last 24 hours) at 3/29/2023 0836  Last data filed at 3/29/2023 0546  Gross per 24 hour   Intake 2658.4 ml   Output 1000 ml   Net 1658.4 ml           Gen: well developed, white female, lying in bed, comfortable appearing  HEENT: MMM, sclerae anicteric, conjunctivae normal, NGT in place  CV: regular rate, regular rhythm, III/VI systolic murmur at RUSB and base, normal S1, S2. 2+ radial pulses  Pulm: BiPAP, normal work of breathing, scattered rhonchi  Abd: soft, nontender, nondistended  Ext: normal bulk for age, normal tone, no dependent edema  Neuro: Awake and alert, face symmetrical, upper extremities in restraints     Tele:  Multiple episodes of Afib with RVR overnight, currently NSR    Results Review (reviewed the patient's recent labs in the electronic medical record):     EKG:  3/27 - NSR, normal ECG, normal QT/QTc     Radiology:   XR Chest 1 View    Result Date: 3/28/2023  Impression: No new chest disease. Electronically Signed: Nathanael Macias  3/28/2023 8:49 AM EDT  Workstation ID: [id number]    3/23/23 - Transthoracic Echo Complete   •  Left ventricular ejection fraction appears to be 51 - 55%.  •  Left ventricular diastolic function is consistent with (grade I) impaired relaxation.  •  Moderate tricuspid valve regurgitation is present. Estimated right ventricular systolic pressure from tricuspid regurgitation is markedly elevated (>55 mmHg).  •  The hepatic veins are dilated consistent with elevated right  atrial pressure.  •  There is moderate thickening of the aortic valve. The aortic valve appears trileaflet. Trace aortic valve regurgitation is present. Mild aortic valve stenosis is present.     Lab Results   Component Value Date    WBC 10.85 (H) 03/29/2023    HGB 8.9 (L) 03/29/2023    HCT 33.1 (L) 03/29/2023    .4 (H) 03/29/2023     (L) 03/29/2023     Lab Results   Component Value Date    GLUCOSE 134 (H) 03/29/2023    CALCIUM 8.9 03/29/2023     (H) 03/29/2023    K 4.5 03/29/2023    CO2 34.0 (H) 03/29/2023     03/29/2023    BUN 36 (H) 03/29/2023    CREATININE 0.86 03/29/2023    BCR 41.9 (H) 03/29/2023    ANIONGAP 6.0 03/29/2023     Assessment     Paroxysmal atrial fibrillation, currently sinus rhythm   - transitioned to amiodarone per NG, will increase to TID with more episodes overnight   - continue metoprolol   - no AC with anemia and potential bleeding    Palliative care following, ongoing discussions with patient's family regarding treatment goals. Currently pursuing 2nd opinion from outside hospital reportedly to honor LW/ACP documents.     MAMTA Gomez MD  3/29/2023  08:36 EDT

## 2023-03-29 NOTE — PROGRESS NOTES
Intensive Care Follow-up     Hospital:  LOS: 7 days   Ms. Sharmila Delarosa, 72 y.o. female is followed for:   Acute on chronic respiratory failure with hypoxia and hypercapnia (HCC)          History of present illness:   Sharmila Delarosa is a 72 year-old female with PMH of former tobacco abuse, COPD, chronic hypoxic and hypercarbic respiratory failure on 2L NC at home, bronchiectasis, LLL pulmonary nodule, rheumatoid arthritis on chronic suppression therapy with prednisone, MTX, and Plaquenil, right breast cancer s/p lumpectomy and adjuvant chemo & XRT; currently on tamoxifen (2015), and GERD that was admitted on 3/21/23 for acute on chronic respiratory failure with hypoxia and hypercarbia. She has been in the ICU since admission on Bipap/HFNC, however, she has not been improving with oxygen requirements. SLP is following and FEES 3/24 showed no aspiration with nectar thick liquids. Following FEES she was given a diet, but unfortunately did have immediate desaturation requiring bipap to recover. Feeding was again trialed on 3/25 with same results. Went into A. Fib with RVR on 3/25. Home metoprolol was started but did not aid in conversion. Ultimately placed on Amiodarone drip which did convert her to NSR.       Subjective   Interval History:  Patient continues on intermittent BiPAP and nasal cannula.  Currently afebrile and normotensive.  On amiodarone for episodes of atrial fibrillation.  Patient remains on Solu-Medrol with the plan steroid taper already in place.             The patient's past medical, surgical and social history were reviewed and updated in Epic as appropriate.       Objective     Infusions:     Medications:  amiodarone, 200 mg, Nasogastric, TID  budesonide, 0.5 mg, Nebulization, BID - RT  heparin (porcine), 5,000 Units, Subcutaneous, Q8H  insulin detemir, 10 Units, Subcutaneous, Daily  insulin regular, 0-7 Units, Subcutaneous, Q6H  ipratropium-albuterol, 3 mL, Nebulization, Q4H -  RT  methylPREDNISolone sodium succinate, 40 mg, Intravenous, Q12H  metoprolol tartrate, 25 mg, Nasogastric, Q12H  pantoprazole, 40 mg, Intravenous, Q AM  [START ON 4/4/2023] predniSONE, 30 mg, Nasogastric, Daily With Breakfast  [START ON 3/30/2023] predniSONE, 40 mg, Nasogastric, BID With Meals  [START ON 4/2/2023] predniSONE, 60 mg, Nasogastric, Daily With Breakfast  sertraline, 100 mg, Nasogastric, Daily  sodium chloride, 10 mL, Intravenous, Q12H      I reviewed the patient's medications.    Vital Sign Min/Max for last 24 hours  Temp  Min: 97 °F (36.1 °C)  Max: 98.1 °F (36.7 °C)   BP  Min: 95/44  Max: 137/76   Pulse  Min: 63  Max: 149   Resp  Min: 22  Max: 28   SpO2  Min: 85 %  Max: 100 %   Flow (L/min)  Min: 2  Max: 55       Input/Output for last 24 hour shift  03/28 0701 - 03/29 0700  In: 2935.2 [I.V.:684.9]  Out: 1000 [Urine:800]      GENERAL : NAD, conversant  RESPIRATORY/THORAX : normal respiratory effort and no intercostal retractions, Coarse crackles bilaterally  CARDIOVASCULAR : Normal S1/S2, RRR. 1+ lower ext edema.  GASTROINTESTINAL : Soft, NT/ND. BS x 4 normoactive. No hepatosplenomegaly.  MUSCULOSKELETAL : No cyanosis, clubbing, or ischemia  NEUROLOGICAL: Awake answering questions, moving all extremities.    Results from last 7 days   Lab Units 03/29/23  0134 03/28/23 0527 03/27/23 0423   WBC 10*3/mm3 10.85* 11.37* 9.27   HEMOGLOBIN g/dL 8.9* 8.3* 8.1*   PLATELETS 10*3/mm3 116* 114* 132*     Results from last 7 days   Lab Units 03/29/23  0200 03/28/23 0527 03/27/23  0423   SODIUM mmol/L 147* 148* 142   POTASSIUM mmol/L 4.5 4.8 4.5   CO2 mmol/L 34.0* 37.0* 33.0*   BUN mg/dL 36* 40* 42*   CREATININE mg/dL 0.86 0.86 1.02*   MAGNESIUM mg/dL 2.4 1.9 2.2  2.2   PHOSPHORUS mg/dL 2.5 2.5 2.0*  2.0*   GLUCOSE mg/dL 134* 220* 416*     Estimated Creatinine Clearance: 64.9 mL/min (by C-G formula based on SCr of 0.86 mg/dL).    Results from last 7 days   Lab Units 03/23/23  0431   PH, ARTERIAL pH units 7.314*    PCO2, ARTERIAL mm Hg 87.2*   PO2 ART mm Hg 61.1*       I reviewed the patient's new clinical results.  I reviewed the patient's new imaging results/reports including actual images and agree with reports.         Assessment & Plan   Impression        AoC respiratory failure with hypoxia and hypercapnia    Stage 4 very severe COPD by GOLD classification    Bronchiectasis    Pulmonary nodule    RA (rheumatoid arthritis)    On chronic immunosuppression therapy    JORGITO    GERD    UTI (urinary tract infection)    Acute respiratory failure with hypoxia and hypercarbia (HCC)    Anemia    Atrial fibrillation with RVR (HCC)    Dysphagia       Plan        72 year-old female with PMH of former tobacco abuse, COPD, chronic hypoxic and hypercarbic respiratory failure on 2L NC at home, bronchiectasis, LLL pulmonary nodule, rheumatoid arthritis on chronic suppression therapy with prednisone, MTX, and Plaquenil, right breast cancer s/p lumpectomy and adjuvant chemo & XRT; currently on tamoxifen (2015), and GERD.  The patient was admitted on 3/21/2023 with acute on chronic hypoxic and hypercapnic respiratory failure with a CT scan of the chest on admission showing a right upper lobe infiltrate consistent with pneumonia.  Speech evaluation was performed on 3/24/2023 with no signs of aspiration.  She then developed complications related to atrial fibrillation and had to be placed on amiodarone on 3/25/2023 eventually converted over to p.o.  Patient completed a 7-day course of Zosyn on 3/28/2023.    -Continue supplemental oxygen with BiPAP on nasal cannula wean to saturation greater than 88%  -Continue steroid taper as previously ordered  -Aggressive pulmonary toilet  -Start nebs with Brovana, budesonide, albuterol as needed, and hypertonic saline nebs twice daily  -We will start percussion vest to help with airway clearance in the setting of bronchiectasis  -Continue tube feeds  -Palliative care following and appears recommendations.   Patient has made very poor progression has an FEV1 of 23% on her last set of PFTs in 2022.  Even though she is making some improvements, she may not fully recover and ultimately if she were to worsen any mechanical ventilation at some point it is very likely that she would not come off the ventilator.  She is now a DNR/DNI    I conducted multidisciplinary rounds in the plan of care was discussed with the multidisciplinary team at that time. In attendance at multidisciplinary rounds was clinical pharmacist, dietitian, nursing staff, and case management.    I discussed the patient's findings and my recommendations with patient and nursing staff       Elías Martin,   Pulmonary, Critical care and Sleep Medicine

## 2023-03-30 NOTE — PROGRESS NOTES
[facility] Cardiology  Inpatient Progress Note      Chief Complaint/Reason for consult: PAF         Subjective  Longer Afib episode yesterday during the day and almost 2 hours at night. SR this morning and denies pain complaints       Vital Sign Min/Max for last 24 hours  Temp  Min: 97.8 °F (36.6 °C)  Max: 98.5 °F (36.9 °C)   BP  Min: 101/82  Max: 135/67   Pulse  Min: 65  Max: 154   Resp  Min: 16  Max: 24   SpO2  Min: 91 %  Max: 100 %   Flow (L/min)  Min: 2  Max: 4      Intake/Output Summary (Last 24 hours) at 3/30/2023 0958  Last data filed at 3/30/2023 0600  Gross per 24 hour   Intake 2348 ml   Output 1375 ml   Net 973 ml           Gen: well developed, white female, lying in bed, comfortable appearing  HEENT: MMM, sclerae anicteric, conjunctivae normal, NGT in place  CV: regular rate, regular rhythm, III/VI systolic murmur at RUSB and base, normal S1, S2. 2+ radial pulses  Pulm: NC oxygen, normal work of breathing, no wheezes or rhonchi  Abd: soft, nontender, nondistended  Ext: normal bulk for age, normal tone, no dependent edema  Neuro: Awake and alert, face symmetrical, upper extremities in restraints     Tele:  Multiple episodes of Afib with RVR overnight, currently NSR since ~10:10pm 3/29    Results Review (reviewed the patient's recent labs in the electronic medical record):     EKG:  3/27 - NSR, normal ECG, normal QT/QTc     Radiology:   No radiology results for the last day  3/23/23 - Transthoracic Echo Complete   •  Left ventricular ejection fraction appears to be 51 - 55%.  •  Left ventricular diastolic function is consistent with (grade I) impaired relaxation.  •  Moderate tricuspid valve regurgitation is present. Estimated right ventricular systolic pressure from tricuspid regurgitation is markedly elevated (>55 mmHg).  •  The hepatic veins are dilated consistent with elevated right atrial pressure.  •  There is moderate thickening of the aortic valve. The aortic valve appears  trileaflet. Trace aortic valve regurgitation is present. Mild aortic valve stenosis is present.     Lab Results   Component Value Date    WBC 10.85 (H) 03/29/2023    HGB 8.9 (L) 03/29/2023    HCT 33.1 (L) 03/29/2023    .4 (H) 03/29/2023     (L) 03/29/2023     Lab Results   Component Value Date    GLUCOSE 134 (H) 03/29/2023    CALCIUM 8.9 03/29/2023     (H) 03/29/2023    K 4.5 03/29/2023    CO2 34.0 (H) 03/29/2023     03/29/2023    BUN 36 (H) 03/29/2023    CREATININE 0.86 03/29/2023    BCR 41.9 (H) 03/29/2023    ANIONGAP 6.0 03/29/2023     Assessment     Paroxysmal atrial fibrillation, currently sinus rhythm   - required IV amio bolus and PRN metoprolol yesterday due to sustained episode   - transitioned to amiodarone per NG, continue TID dosing for now   - continue metoprolol   - no AC with anemia and potential bleeding    Palliative care following, ongoing discussions with patient's family regarding treatment goals.     MAMTA Gomez MD  3/30/2023  09:58 EDT

## 2023-03-30 NOTE — CONSULTS
Clinical Nutrition     Nutrition Support Assessment  Reason for Visit: Follow-up protocol, EN      Patient Name: Sharmila Delarosa  YOB: 1950  MRN: [id number]  Date of Encounter: 03/29/23 23:37 EDT  Admission date: 3/21/2023    Comments: No stool reported 3/28. Transitioned to Peptamen 1.5. Note Prosource order not in place 2/2 drug/drug interaction alert. Leave for now as less GI Distress w this formula. When Impact Peptide becomes available - need to substitute     Nutrition Assessment   Admission Diagnosis:  Acute respiratory failure with hypoxia and hypercarbia (HCC) [J96.01, J96.02]      Problem List:    AoC respiratory failure with hypoxia and hypercapnia    Stage 4 very severe COPD by GOLD classification    Bronchiectasis    Pulmonary nodule    RA (rheumatoid arthritis)    On chronic immunosuppression therapy    GERD    UTI (urinary tract infection)    Acute respiratory failure with hypoxia and hypercarbia (HCC)    Anemia    Atrial fibrillation with RVR (HCC)    Dysphagia    Pneumonia of right upper lobe due to infectious organism        PMH:  She  has a past medical history of Anxiety, Breast cancer (HCC) (2015), Depression, Drug therapy, GERD (3/21/2023), radiation therapy, On chronic immunosuppression therapy (3/21/2023), and RA (rheumatoid arthritis) (3/21/2023).    PSH: She  has a past surgical history that includes Breast lumpectomy and Breast biopsy (Right, 2015).      Applicable Nutrition Concerns:   Skin:  Oral:  GI:      Applicable Interval History:   (3/23) SLP eval - NPO  (3/24) SLP FEES - soft textures/chopped meats/nectar thick liquids;  Small bore NG tube placed for EN  Per KUB at [facility]  (3/28) SLP rec NPO - for comfort consider 3 3oz snacks. Full comfort diet soft or pureed    Reported/Observed/Food/Nutrition Related History:     3/29  Diarrhea better. Transitioned to Peptamen 1.5 Pt now DNR/DNI    3/27 RN reports pt has not stopped having diarrhea since tube  feeding started, NP held it at current rate of 50 ml/hr. Pt is till requiring BiAP does have periods of tolerating HFNC, amio restarted by cardiology. SLP to see for FEES.    RN reports patient had PO diet initiated yesterday, but whenever pt takes a bit or sip of something she coughs and her oxygen sats decrease. Pt to be NPO for now. A small bore feeding tube was placed yesterday and EN will be initiated today (pt in agreement per palliative care notes). Note NKFA.    Pt has been requiring both Bipap and HFNC. RD recommended feeding tube be advanced to post-pyloric given use of Bipap. If unable to advance, will need to hold EN during Bipap use.      Labs    Labs Reviewed: Yes elevated NA+ noted    Results from last 7 days   Lab Units 03/29/23  0200 03/28/23  0527 03/27/23  0423 03/24/23  0641 03/23/23  0229   GLUCOSE mg/dL 134* 220* 416*   < > 112*   BUN mg/dL 36* 40* 42*   < > 35*   CREATININE mg/dL 0.86 0.86 1.02*   < > 1.10*   SODIUM mmol/L 147* 148* 142   < > 146*   CHLORIDE mmol/L 107 108* 100   < > 99   POTASSIUM mmol/L 4.5 4.8 4.5   < > 4.2   PHOSPHORUS mg/dL 2.5 2.5 2.0*  2.0*   < > 3.6   MAGNESIUM mg/dL 2.4 1.9 2.2  2.2   < > 2.1   ALT (SGPT) U/L  --   --  15  --  16    < > = values in this interval not displayed.       Results from last 7 days   Lab Units 03/28/23  0527 03/27/23  0423 03/26/23  0811 03/23/23  0229   ALBUMIN g/dL  --  2.9*  --  3.0*   IONIZED CALCIUM mmol/L 1.32 1.17 1.09*  --    CHOLESTEROL mg/dL  --  148  --   --    TRIGLYCERIDES mg/dL  --  267*  --   --        Results from last 7 days   Lab Units 03/29/23  2324 03/29/23  1718 03/29/23  1122 03/29/23  0616 03/28/23  2340 03/28/23  1707   GLUCOSE mg/dL 189* 147* 155* 172* 108 164*     No results found for: HGBA1C            Medications    Medications Reviewed: Yes  Pertinent: insulin, steroid, protonix  Infusion:   PRN:     Intake/Ouptut 24 hrs (0701 - 0700)   I&O's Reviewed: Yes   Intake & Output (last day)       03/29 0701  03/30  "0700    I.V. (mL/kg)     Other 750    NG/    IV Piggyback     Total Intake(mL/kg) 1504 (22.3)    Urine (mL/kg/hr) 575 (0.5)    Stool 200    Total Output 775    Net +729         Urine Unmeasured Occurrence 1 x      No stool recorded for 3/28 (x 3 on 3/26, x 4 on 3/27)    Anthropometrics     Admission Height 167.6 cm (66\") Documented at 03/21/2023 2138   Admission Weight 59 kg (130 lb) Documented at 03/21/2023 2138          Height: Height: 167.6 cm (66\")  Last Filed Weight: Weight: 67.5 kg (148 lb 13 oz) (03/29/23 0400)  Method: Weight Method: Bed scale  BMI: BMI (Calculated): 24  BMI classification: Normal: 18.5-24.9kg/m2    Weight Weight (kg) Weight (lbs) Weight Method VISIT REPORT   3/26/2023 64 kg 141 lb 1.5 oz Bed scale -   3/25/2023 63.7 kg 140 lb 6.9 oz Bed scale -   3/24/2023 63.2 kg 139 lb 5.3 oz Bed scale -   3/24/2023 61.2 kg 134 lb 14.7 oz Bed scale -   3/23/2023 61 kg 134 lb 7.7 oz Bed scale -   3/23/2023 59.421 kg 131 lb - -   3/23/2023 59.7 kg 131 lb 9.8 oz Bed scale -   3/21/2023 58.968 kg 130 lb Estimated -     UBW:  Weight Change:  Nutrition Focused Physical Exam     Date:   Unable to perform exam due to: Potential for patient discomfort, Defer pending indication, d/t respiratory status *     Needs Assessment   Date: 3/25, 3/27    Height used: 167.6 cm  Weights used: 64 kg      Estimated Calorie needs: ~ 1700 calories daily  Method: 12-25 Kcals/KG  For critical illness = 768-1600 kcal/kg  Method:  MSJ = 1177 x 1.25 = 1471    Estimated Protein needs: ~83 g protein daily (1.3 gm/kg)  Method: 1.2-1.5 g/Kg =  64-96 kg    Estimated Fluid needs:  To correct hypernatremia    Current Nutrition Prescription     PO: NPO Diet NPO Type: Tube Feeding    Intake: Insufficient data NPO since 3/22    EN: Peptamen 1.5  Goal Rate: 50 ml/hr  Water Flushes: 50 ml/hr  Modular: Prosource -no carb 1/day (order not in place 2/2 drug/drug interaction alert)  Route: ND  Tube: Small bore    At goal over: 20Hrs/day    Rx " will supply w Prosource  Goal Volume 1000 mL/day     Flush Volume 1000 mL/day     Energy 1560 Kcal/day 98 % Est Need   Protein 83 g/day 100 % Est Need   Fiber 0 g/day     Water in   mL     Total Water 1770 mL     Meet DRI Yes      Note this rx w/o Prosource supplies 1500 Kcal 88% est need and 68 g PRO 82% est need.  Prev rx Fibersource HN 75 ml/hr Water at 25 ml/hr over 20 hr to supply 1800 Kcal 81 g PRO, 23 g Fiber, 1215 ml Water in EN 1715 total ml Water.    --------------------------------------------------------------------------  Product/Rate verified at bedside: Yes  Infusing Rate at time of visit: Peptamen 1.5  50 ml/hr Water at 50 ml/hr.    Average Delivery from Chartin Day: including transition from Fibersource HN at 10:00  Volume  mL/day   % Goal Vol.   Flush Volume 977 mL/day     Energy 1798 Kcal/day 106 % Est Need   Protein 82 g/day 99 % Est Need   Fiber 3.9 g/day     Water in   mL     Total Water 1950 mL     Meet DRI Yes          Nutrition Diagnosis     Date: 3/27 Updated: 3/29  Problem Inadequate energy intake/protein intake   Etiology A/C Resp failure/ stage 4 COPD   Signs/Symptoms HFNC/BiPAP/NPO , failed FEES   Status: ongoing EN support in place    Goal:   General: Nutrition support treatment, Palliative care  PO: Advace diet as medically feasible/appropriate  EN/PN: Maintain EN    Nutrition Intervention      Follow treatment progress, Care plan reviewed, Nutrition support order placed for Prosource      Monitoring/Evaluation:   Per protocol, I&O, Pertinent labs, EN delivery/tolerance, Weight, Symptoms, POC/GOC, Swallow function      Purnima Dominguez RD  Time Spent: 35 min

## 2023-03-30 NOTE — PROGRESS NOTES
Intensive Care Follow-up     Hospital:  LOS: 8 days   Ms. Sharmila Delarosa, 72 y.o. female is followed for:   Acute on chronic respiratory failure with hypoxia and hypercapnia (HCC)            History of present illness:   Sharmila Delarosa is a 72 year-old female with PMH of former tobacco abuse, COPD, chronic hypoxic and hypercarbic respiratory failure on 2L NC at home, bronchiectasis, LLL pulmonary nodule, rheumatoid arthritis on chronic suppression therapy with prednisone, MTX, and Plaquenil, right breast cancer s/p lumpectomy and adjuvant chemo & XRT; currently on tamoxifen (2015), and GERD that was admitted on 3/21/23 for acute on chronic respiratory failure with hypoxia and hypercarbia. She has been in the ICU since admission on Bipap/HFNC, however, she has not been improving with oxygen requirements. SLP is following and FEES 3/24 showed no aspiration with nectar thick liquids. Following FEES she was given a diet, but unfortunately did have immediate desaturation requiring bipap to recover. Feeding was again trialed on 3/25 with same results. Went into A. Fib with RVR on 3/25. Home metoprolol was started but did not aid in conversion. Ultimately placed on Amiodarone drip which did convert her to NSR.       Subjective   Interval History:  Patient currently on 4 L nasal cannula.  Episodes of atrial fibrillation yesterday but these are now resolved.  No other acute complaints.             The patient's past medical, surgical and social history were reviewed and updated in Epic as appropriate.       Objective     Infusions:     Medications:  amiodarone, 200 mg, Nasogastric, TID  arformoterol, 15 mcg, Nebulization, BID - RT  budesonide, 0.5 mg, Nebulization, BID - RT  heparin (porcine), 5,000 Units, Subcutaneous, Q8H  insulin detemir, 10 Units, Subcutaneous, Daily  insulin regular, 0-7 Units, Subcutaneous, Q6H  metoprolol tartrate, 25 mg, Nasogastric, Q12H  pantoprazole, 40 mg, Intravenous, Q AM  [START ON  4/4/2023] predniSONE, 30 mg, Nasogastric, Daily With Breakfast  predniSONE, 40 mg, Nasogastric, BID With Meals  [START ON 4/2/2023] predniSONE, 60 mg, Nasogastric, Daily With Breakfast  sertraline, 100 mg, Nasogastric, Daily  sodium chloride, 10 mL, Intravenous, Q12H  sodium chloride, 4 mL, Nebulization, BID - RT      I reviewed the patient's medications.    Vital Sign Min/Max for last 24 hours  Temp  Min: 97.8 °F (36.6 °C)  Max: 98.5 °F (36.9 °C)   BP  Min: 103/61  Max: 133/59   Pulse  Min: 65  Max: 147   Resp  Min: 16  Max: 24   SpO2  Min: 91 %  Max: 99 %   Flow (L/min)  Min: 2  Max: 4       Input/Output for last 24 hour shift  03/29 0701 - 03/30 0700  In: 2348   Out: 1375 [Urine:1075]      GENERAL : NAD, conversant  RESPIRATORY/THORAX : normal respiratory effort and no intercostal retractions, Coarse crackles bilaterally  CARDIOVASCULAR : Normal S1/S2, RRR. 1+ lower ext edema.  GASTROINTESTINAL : Soft, NT/ND. BS x 4 normoactive. No hepatosplenomegaly.  MUSCULOSKELETAL : No cyanosis, clubbing, or ischemia  NEUROLOGICAL: alert and oriented to person, place and time  PSYCHOLOGICAL : Appropriate affect    Results from last 7 days   Lab Units 03/29/23  0134 03/28/23 0527 03/27/23  0423   WBC 10*3/mm3 10.85* 11.37* 9.27   HEMOGLOBIN g/dL 8.9* 8.3* 8.1*   PLATELETS 10*3/mm3 116* 114* 132*     Results from last 7 days   Lab Units 03/29/23  0200 03/28/23  0527 03/27/23  0423   SODIUM mmol/L 147* 148* 142   POTASSIUM mmol/L 4.5 4.8 4.5   CO2 mmol/L 34.0* 37.0* 33.0*   BUN mg/dL 36* 40* 42*   CREATININE mg/dL 0.86 0.86 1.02*   MAGNESIUM mg/dL 2.4 1.9 2.2  2.2   PHOSPHORUS mg/dL 2.5 2.5 2.0*  2.0*   GLUCOSE mg/dL 134* 220* 416*     Estimated Creatinine Clearance: 64.9 mL/min (by C-G formula based on SCr of 0.86 mg/dL).          I reviewed the patient's new clinical results.  I reviewed the patient's new imaging results/reports including actual images and agree with reports.         Assessment & Plan   Impression         AoC respiratory failure with hypoxia and hypercapnia    Stage 4 very severe COPD by GOLD classification    Bronchiectasis    Pulmonary nodule    RA (rheumatoid arthritis)    On chronic immunosuppression therapy    GERD    UTI (urinary tract infection)    Acute respiratory failure with hypoxia and hypercarbia (HCC)    Anemia    Atrial fibrillation with RVR (HCC)    Dysphagia    Pneumonia of right upper lobe due to infectious organism       Plan        72 year-old female with PMH of former tobacco abuse, COPD, chronic hypoxic and hypercarbic respiratory failure on 2L NC at home, bronchiectasis, LLL pulmonary nodule, rheumatoid arthritis on chronic suppression therapy with prednisone, MTX, and Plaquenil, right breast cancer s/p lumpectomy and adjuvant chemo & XRT; currently on tamoxifen (2015), and GERD.  The patient was admitted on 3/21/2023 with acute on chronic hypoxic and hypercapnic respiratory failure with a CT scan of the chest on admission showing a right upper lobe infiltrate consistent with pneumonia.  Speech evaluation was performed on 3/24/2023 with no signs of aspiration.  She then developed complications related to atrial fibrillation and had to be placed on amiodarone on 3/25/2023 eventually converted over to p.o.  Patient completed a 7-day course of Zosyn on 3/28/2023.     -Continue supplemental oxygen with BiPAP on nasal cannula wean to saturation greater than 88%  -Continue steroid taper as previously ordered  -Aggressive pulmonary toilet  -Continue Brovana, budesonide, albuterol as needed, and hypertonic saline nebs twice daily  -Continue percussion vest to help with airway clearance in the setting of bronchiectasis  -Continue tube feeds  -Palliative care following and appears recommendations.  Patient has made very poor progression has an FEV1 of 23% on her last set of PFTs in 2022.  Even though she is making some improvements, she may not fully recover and ultimately if she were to worsen any  mechanical ventilation at some point it is very likely that she would not come off the ventilator.  She is now a DNR/DNI.  Ultimately I think that the patient makes very little progression a palliative approach is the best option at this point as there is very little we can offer to improve her lung function or overall functional status.  -Patient is okay to be transferred to the floor    I conducted multidisciplinary rounds in the plan of care was discussed with the multidisciplinary team at that time. In attendance at multidisciplinary rounds was clinical pharmacist, dietitian, nursing staff, and case management.    I discussed the patient's findings and my recommendations with patient and nursing staff       Elías Martin, DO  Pulmonary, Critical care and Sleep Medicine

## 2023-03-30 NOTE — CASE MANAGEMENT/SOCIAL WORK
Continued Stay Note  [facility]     Patient Name: Sharmila Delarosa  MRN: [id number]  Today's Date: 3/30/2023    Admit Date: 3/21/2023    Plan: Ongoing   Discharge Plan     Row Name 03/30/23 0947       Plan    Plan Ongoing      Plan Comments Ms. Delarosa is now on continuous oxygen at 4Lpm. Palliative care is following for goals of care. Her code status has been changed to DNR/DNI. Case management will continue to follow Ms. Delarosa's progress and provide for her a safe discharge plan.               Discharge Codes    No documentation.               Expected Discharge Date and Time     Expected Discharge Date Expected Discharge Time    Mar 31, 2023             Iliana Reid RN

## 2023-03-30 NOTE — PLAN OF CARE
Goal Outcome Evaluation:           Progress: no change  Outcome Evaluation: Pt on 4L NC. Very lethargic. NG tube in left nare and FMS in place. Strict NPO. PRN meds to control a-fib rhythm. Will continue plan of care.

## 2023-03-30 NOTE — SIGNIFICANT NOTE
03/30/23 1118   SLP Deferred Reason   SLP Deferred Reason Routine  (Patient deferred pending GOC decisions.)

## 2023-03-30 NOTE — PLAN OF CARE
Problem: Adjustment to Illness COPD (Chronic Obstructive Pulmonary Disease)  Goal: Optimal Chronic Illness Coping  Intervention: Support and Optimize Psychosocial Response  Recent Flowsheet Documentation  Taken 3/30/2023 1543 by Marissa Stearns RN  Supportive Measures: (palliative IDT: RN, SW, APRN, DO) other (see comments)  Family/Support System Care:   presence promoted   caregiver stress acknowledged   self-care encouraged   support provided   involvement promoted   Goal Outcome Evaluation:  Plan of Care Reviewed With: patient, sibling        Progress: no change  Outcome Evaluation: Pt is alert and oriented. 2 sisters at bedside visiting with pt and discussing memories. Pt is currently npo and has keofeed in place. Pt has not made any decisions regarding swallowing. Pt reports breathing is better now that afib under control. Pt still using plb at rest. Pt denies pain except for occasional headache. Prn tylenol effective.  Pt is no cpr/intubation.  Continue goals discussions chacorta regarding swallowing.

## 2023-03-30 NOTE — PROGRESS NOTES
NN spoke with patient and sister at .  Breathing treatment in progress.  Deep breathing exercises encouraged.  Patient continues with tube feedings.  No other questions or concerns at this time.  NN continues to follow.      Per current GOLD Standards, please consider:  LAMA in place (Spiriva Respimat), No LABA/ICS in place, Outpatient PFT, Rehab as appropriate, Palliative Care consult,  Annual LDCT per current screening guidelines (age 50-80 years old, smoking history of 20 pack years or more or has quit within past 15 years)      Patient has been scheduled for a hospital follow up with [facility] for 4/19/2023 @ 2:30 pm with DINO Mendiola.

## 2023-03-30 NOTE — PLAN OF CARE
Goal Outcome Evaluation:  Plan of Care Reviewed With: patient        Progress: no change       Pt. Rested well throughout the night. Remained on 4 LPM via NC.  VSS, no acute changed. HR 70-80 a majority of the night, infrequent runs of RVR until scheduled amio and metoprolol were given.

## 2023-03-31 PROBLEM — J96.90 RESPIRATORY FAILURE: Status: ACTIVE | Noted: 2023-01-01

## 2023-03-31 NOTE — PROGRESS NOTES
[facility] Medicine Services  PROGRESS NOTE    Patient Name: Sharmila Delarosa  : 1950  MRN: [id number]    Date of Admission: 3/21/2023  Primary Care Physician: Bala Gomez MD    Subjective   Subjective     CC:  F/U respiratory failure    HPI:  Patient seen this morning, on BiPAP. Keeps eyes closed but able to answer simple questions. BiPAP removed so I could talk to her, and she desaturated to 80% on 6 liters. Remains tachycardic.     ROS:  Currently denies chest pain, SOA, cough    Objective   Objective     Vital Signs:   Temp:  [96.4 °F (35.8 °C)-98.1 °F (36.7 °C)] 97.8 °F (36.6 °C)  Heart Rate:  [] 117  Resp:  [20-35] 26  BP: (109-138)/(57-80) 109/63  Flow (L/min):  [4-6] 6     Physical Exam:  Gen-no acute distress, frail appearance   CV-tachycardic, S1 S2 normal, no m/r/g  Resp-coarse breath sounds bilaterally, no wheezes, on BiPAP  Abd-soft, NT, ND, +BS  Ext-trace BLE edema  Neuro-keeps eyes closed but able to answer most questions appropriately, no focal deficits    Results Reviewed:  LAB RESULTS:      Lab 23  0134 23  0527 23  0423 23  0103 23  1828 23  1225 23  0811 23  0513   WBC 10.85* 11.37* 9.27  --   --   --  4.75 7.60   HEMOGLOBIN 8.9* 8.3* 8.1* 8.3* 8.6*   < > 7.8* 8.9*   HEMATOCRIT 33.1* 30.9* 30.0* 31.1* 31.4*   < > 27.4* 32.9*   PLATELETS 116* 114* 132*  --   --   --  126* 143   .4* 103.7* 103.8*  --   --   --  100.0* 102.5*    < > = values in this interval not displayed.         Lab 23  0200 23  0527 23  0423 23  1828 23  0811 23  0513   SODIUM 147* 148* 142  --  150* 148*   POTASSIUM 4.5 4.8 4.5 5.0 3.6 4.6   CHLORIDE 107 108* 100  --  106 104   CO2 34.0* 37.0* 33.0*  --  33.0* 41.0*   ANION GAP 6.0 3.0* 9.0  --  11.0 3.0*   BUN 36* 40* 42*  --  40* 38*   CREATININE 0.86 0.86 1.02*  --  0.84 0.88   EGFR 72.8 72.8 59.3*  --  74.9 70.8   GLUCOSE 134* 220* 416*  --   212* 89   CALCIUM 8.9 8.5* 7.9*  --  7.8* 9.0   IONIZED CALCIUM  --  1.32 1.17  --  1.09*  --    MAGNESIUM 2.4 1.9 2.2  2.2  --  1.6 1.9   PHOSPHORUS 2.5 2.5 2.0*  2.0*  --  2.6 4.3         Lab 03/27/23  0423   TOTAL PROTEIN 5.2*   ALBUMIN 2.9*   GLOBULIN 2.3   ALT (SGPT) 15   AST (SGOT) 12   BILIRUBIN 0.2   ALK PHOS 43             Lab 03/27/23 0423   CHOLESTEROL 148   TRIGLYCERIDES 267*             Brief Urine Lab Results  (Last result in the past 365 days)      Color   Clarity   Blood   Leuk Est   Nitrite   Protein   CREAT   Urine HCG        03/21/23 2228 Yellow   Cloudy   Trace   Trace   Negative   30 mg/dL (1+)                 Microbiology Results Abnormal     Procedure Component Value - Date/Time    Blood Culture - Blood, Arm, Right [[id number]]  (Normal) Collected: 03/21/23 2140    Lab Status: Final result Specimen: Blood from Arm, Right Updated: 03/26/23 2200     Blood Culture No growth at 5 days    Blood Culture - Blood, Hand, Left [[id number]]  (Normal) Collected: 03/21/23 2135    Lab Status: Final result Specimen: Blood from Hand, Left Updated: 03/26/23 2200     Blood Culture No growth at 5 days    MRSA Screen, PCR (Inpatient) - Swab, Nares [[id number]]  (Normal) Collected: 03/22/23 0400    Lab Status: Final result Specimen: Swab from Nares Updated: 03/22/23 1114     MRSA PCR Negative    Narrative:      The negative predictive value of this diagnostic test is high and should only be used to consider de-escalating anti-MRSA therapy. A positive result may indicate colonization with MRSA and must be correlated clinically.  MRSA Negative    Legionella Antigen, Urine - Urine, Urine, Catheter In/Out [[id number]]  (Normal) Collected: 03/21/23 2228    Lab Status: Final result Specimen: Urine, Catheter In/Out Updated: 03/22/23 0943     LEGIONELLA ANTIGEN, URINE Negative    S. Pneumo Ag Urine or CSF - Urine, Urine, Catheter In/Out [[id number]]  (Normal) Collected: 03/21/23 2228    Lab Status: Final result Specimen:  Urine, Catheter In/Out Updated: 03/22/23 0943     Strep Pneumo Ag Negative    COVID PRE-OP / PRE-PROCEDURE SCREENING ORDER (NO ISOLATION) - Swab, Nasopharynx [[id number]]  (Normal) Collected: 03/21/23 2152    Lab Status: Final result Specimen: Swab from Nasopharynx Updated: 03/21/23 2256    Narrative:      The following orders were created for panel order COVID PRE-OP / PRE-PROCEDURE SCREENING ORDER (NO ISOLATION) - Swab, Nasopharynx.  Procedure                               Abnormality         Status                     ---------                               -----------         ------                     Respiratory Panel PCR w/...[[id number]]  Normal              Final result                 Please view results for these tests on the individual orders.    Respiratory Panel PCR w/COVID-19(SARS-CoV-2) DANETTE/RENETTA/NIKKI/PAD/COR/MAD/LAVONNE In-House, NP Swab in UTM/VTM, 3-4 HR TAT - Swab, Nasopharynx [[id number]]  (Normal) Collected: 03/21/23 2152    Lab Status: Final result Specimen: Swab from Nasopharynx Updated: 03/21/23 2256     ADENOVIRUS, PCR Not Detected     Coronavirus 229E Not Detected     Coronavirus HKU1 Not Detected     Coronavirus NL63 Not Detected     Coronavirus OC43 Not Detected     COVID19 Not Detected     Human Metapneumovirus Not Detected     Human Rhinovirus/Enterovirus Not Detected     Influenza A PCR Not Detected     Influenza B PCR Not Detected     Parainfluenza Virus 1 Not Detected     Parainfluenza Virus 2 Not Detected     Parainfluenza Virus 3 Not Detected     Parainfluenza Virus 4 Not Detected     RSV, PCR Not Detected     Bordetella pertussis pcr Not Detected     Bordetella parapertussis PCR Not Detected     Chlamydophila pneumoniae PCR Not Detected     Mycoplasma pneumo by PCR Not Detected    Narrative:      In the setting of a positive respiratory panel with a viral infection PLUS a negative procalcitonin without other underlying concern for bacterial infection, consider observing off antibiotics or  discontinuation of antibiotics and continue supportive care. If the respiratory panel is positive for atypical bacterial infection (Bordetella pertussis, Chlamydophila pneumoniae, or Mycoplasma pneumoniae), consider antibiotic de-escalation to target atypical bacterial infection.          No radiology results from the last 24 hrs    Results for orders placed during the hospital encounter of 03/21/23    Adult Transthoracic Echo Complete W/ Cont if Necessary Per Protocol    Interpretation Summary  •  Left ventricular ejection fraction appears to be 51 - 55%.  •  Left ventricular diastolic function is consistent with (grade I) impaired relaxation.  •  Moderate tricuspid valve regurgitation is present. Estimated right ventricular systolic pressure from tricuspid regurgitation is markedly elevated (>55 mmHg).  •  The hepatic veins are dilated consistent with elevated right atrial pressure.  •  There is moderate thickening of the aortic valve. The aortic valve appears trileaflet. Trace aortic valve regurgitation is present. Mild aortic valve stenosis is present.      Current medications:  Scheduled Meds:arformoterol, 15 mcg, Nebulization, BID - RT  budesonide, 0.5 mg, Nebulization, BID - RT  pantoprazole, 40 mg, Intravenous, Q AM  predniSONE, 40 mg, Nasogastric, BID With Meals  sodium chloride, 10 mL, Intravenous, Q12H  sodium chloride, 4 mL, Nebulization, BID - RT      Continuous Infusions:amiodarone, 1 mg/min, Last Rate: 1 mg/min (03/31/23 1013)   Followed by  amiodarone, 0.5 mg/min      PRN Meds:.•  acetaminophen  •  ipratropium-albuterol  •  sodium chloride    Assessment & Plan   Assessment & Plan     Active Hospital Problems    Diagnosis  POA   • **AoC respiratory failure with hypoxia and hypercapnia [J96.21, J96.22]  Yes   • Pneumonia of right upper lobe due to infectious organism [J18.9]  Yes   • Atrial fibrillation with RVR (HCC) [I48.91]  Yes   • Dysphagia [R13.10]  Yes   • Anemia [D64.9]  Yes   • Acute  respiratory failure with hypoxia and hypercarbia (HCC) [J96.01, J96.02]  Yes   • RA (rheumatoid arthritis) [M06.9]  Yes   • On chronic immunosuppression therapy [D84.9]  Yes   • GERD [K21.9]  Yes   • UTI (urinary tract infection) [N39.0]  Yes   • Stage 4 very severe COPD by GOLD classification [J44.9]  Yes   • Pulmonary nodule [R91.1]  Yes   • Bronchiectasis [J47.9]  Yes      Resolved Hospital Problems    Diagnosis Date Resolved POA   • JORGITO [N17.9] 03/29/2023 Yes        Brief Hospital Course to date:  Sharmila Delarosa is a 72 y.o. female with hx of COPD on 2 liters at home, former tobacco abuse, bronchiectasis, LLL pulmonary nodule, GERD, rheumatoid arthritis, and breast cancer who was admitted to [facility] ICU on 3/21/23 due to acute on chronic respiratory failure. CTA chest on admission showed a RUL PNA as well as a more lobular pleural based mass in the inferior aspect of the RUL measuring 4 x 3.2 cm, could be consolidative PNA vs soft tissue neoplasm; areas of mucous plugging noted in the RLL as well as bronchiectasis and bronchiolitis; miliary type nodularity throughout both lungs most likley representing infectious bronchiolitis vs atypical infection/fungal infection. She was treated with IV steroids and ATBx (Vanc/Zosyn), BiPAP and HFNC. Has not made any improvements in her O2 requirements. Initially passed her swallow evaluation but then after nectar thick liquids were given, she had a suspected aspiration event with desaturation requiring BiPAP to recover; had another episode of desaturation the next day with repeated food trial. She then went into Afib with RVR on 3/25/23. Placed on Amiodarone drip. Cardiology has been following. Palliative Care was consulted on 3/23/23 and continues to follow for goals of care discussion. Transferred to telemetry and hospitalist service assumed care on 3/31/23.     *All problems are new to me today.     Acute on chronic respiratory failure with hypoxia and hypercapnia   Stage  IV COPD  Bronchiectasis with exacerbation  Aspiration PNA  --Was on Vanc/Zosyn initially, deescalated to Zosyn only to complete course  --Negative blood cultures, urinary antigens, negative MRSA PCR  --s/p IV steroids, transitioned to PO prednisone  --Remains on BiPAP for the most part, unable to tolerate 6 liters this morning with desaturations into the low 80's  --Continue nebs and percussion vest as recommended by Pulmonary    Dysphagia with recurrent aspiration  --SLP following  --NPO  --On tube feeds, Nutrition following    Afib with RVR  --Cardiology following  --Remains on IV Amiodarone  --In setting of anemia, no anticoagulation recommended    Diarrhea  --Started with TF's  --Afebrile  --Rectal tube in place to help manage    Sepsis, POA  UTI, POA  --s/p course of Zosyn, no urine culture had been sent on admission    JORGITO  --Resolved with IV fluids    Rheumatoid arthritis  --On chronic prednisone, Plaquenil    Anemia  --Folate, B12 normal  --FOBT negative  --s/p 2 units PRBC on 3/23/23    Pulmonary nodule, RUL  --Seen on CTA chest  --Outpatient follow up if desired by patient/family    Goals of care  --Palliative Care following, despite aggressive treatment she remains dependent on BiPAP  --She is DNR/DNI  --Family considering comfort measures    Total time spent: Time Spent: Time Spent: 50 minutes  Time spent includes time reviewing chart, face-to-face time, counseling patient/family/caregiver, ordering medications/tests/procedures, communicating with other health care professionals, documenting clinical information in the electronic health record, and coordination of care.     Expected Discharge Location and Transportation: TBD  Expected Discharge TBD  Expected Discharge Date and Time     Expected Discharge Date Expected Discharge Time    Apr 3, 2023            DVT prophylaxis:  Mechanical DVT prophylaxis orders are present.     AM-PAC 6 Clicks Score (PT): 9 (03/29/23 2000)    CODE STATUS:   Code Status and  Medical Interventions:   Ordered at: 03/31/23 1408     Level Of Support Discussed With:    Health Care Surrogate     Code Status (Patient has no pulse and is not breathing):    No CPR (Do Not Attempt to Resuscitate)     Medical Interventions (Patient has pulse or is breathing):    Comfort Measures     Release to patient:    Routine Release       Beth Saravia MD  03/31/23

## 2023-03-31 NOTE — CASE MANAGEMENT/SOCIAL WORK
Continued Stay Note  [facility]     Patient Name: Sharmila Delarosa  MRN: [id number]  Today's Date: 3/31/2023    Admit Date: 3/21/2023    Plan: TBD   Discharge Plan     Row Name 03/31/23 1133       Plan    Plan TBD    Plan Comments Discussed patient in MDR.  Patient remains on BiPAP.  Discharge plan is undetermined at this time.  Palliative assisting.  CM will continue to follow.    Final Discharge Disposition Code 30 - still a patient               Discharge Codes    No documentation.               Expected Discharge Date and Time     Expected Discharge Date Expected Discharge Time    Apr 3, 2023             Rosangela Parra RN

## 2023-03-31 NOTE — DISCHARGE PLACEMENT REQUEST
"Sima Donald (72 y.o. Female)     Date of Birth   1950    Social Security Number       Address   [address] [place] [address]  [facility] [address]    Home Phone   [phone]    MRN   [id number]       Episcopal   None    Marital Status                               Admission Date   3/21/23    Admission Type   Emergency    Admitting Provider   Beth Saravia MD    Attending Provider   Beth Saravia MD    Department, Room/Bed   [facility] 6B, N629/1       Discharge Date       Discharge Disposition       Discharge Destination                               Attending Provider: Beth Saravia MD    Allergies: Cephalosporins    Isolation: None   Infection: None   Code Status: No CPR    Ht: 167.6 cm (66\")   Wt: 67.1 kg (148 lb)    Admission Cmt: None   Principal Problem: AoC respiratory failure with hypoxia and hypercapnia [J96.21,J96.22]                 Active Insurance as of 3/21/2023     Primary Coverage     Payor Plan Insurance Group Employer/Plan Group    HUMANA MEDICARE REPLACEMENT HUMANA MEDICARE REPLACEMENT [id number]     Payor Plan Address Payor Plan Phone Number Payor Plan Fax Number Effective Dates    [address] [address] [phone]  1/1/2018 - None Entered    [facility] [id number]       Subscriber Name Subscriber Birth Date Member ID       SIMA DONALD 1950 [id number]                 Emergency Contacts      (Rel.) Home Phone Work Phone Mobile Phone    SYLVIE DONALD (Son) [phone] -- [phone]            Emergency Contact Information     Name Relation Home Work Mobile    SYLVIE DONALD Son [phone]          Insurance Information                HUMANA MEDICARE REPLACEMENT/HUMANA MEDICARE REPLACEMENT Phone: [phone]    Subscriber: Sima Donald Subscriber#: [id number]    Group#: [id number] Precert#: [id number]             History & Physical      Justice Kraft MD at 03/21/23 2230          Intensive Care Admission " Note     Acute on chronic respiratory failure with hypoxia and hypercapnia (HCC)    History of Present Illness     Sharmila Delarosa is a 72 y.o. female who presents to [facility] ED 03/21/23 with respiratory failure.     Patient has a PMH of former smoker, COPD / chronic respiratory failure on home O2 (2L), bronchiectasis, LLL pulmonary nodule, RA, chronic immunosuppression therapy on prednisone, MTX, & Plaquenil, right breast CA s/p lumpectomy with adjuvant chemo & XRT (2015), and GERD.  She completed the Pfizer COVID-19 vaccination series + booster dose on 4/10/22.    Per report, this afternoon patient's son returned home from work when he found her slumped over in the recliner poorly responsive, prompting call to EMS. Upon their arrival she was noted to be hypoxic with SpO2 70% on RA and was placed on NRB mask with improvement in oxygenation and mentation.  She was brought to our ED where upon arrival ABG demonstrated pH 7.37 / pCO2 93.2 / HCO3 54.6.  Labs were notable for HS troponin 42, proBNP 3834, K 3.1, sCr 1.23, BUN 24, lactate 2.5, WBC 27.74, Hgb 8.6 and Hct 32.1. UA with 4+ bacteria, trace leukocytes, and 3-5 WBC. Respiratory PCR testing w/COVID-19 negative. CTH and CTA chest are currently pending.  In the ED she received a DuoNeb and 125 mg IV Solu-Medrol. Due to septic concern BC x2 were drawn, she was initiated on IVF (1L NS), and received empiric doses of Zosyn and Azithromycin. Ms. Delarosa will be admitted to ICU for further management.     Time spent: 15 minutes  Electronically signed by Irene Gomez, MAYRA, APRN, 03/21/23, 11:01 PM EDT.     Problem List, Surgical History, Family, Social History, and ROS     Patient Active Problem List    Diagnosis    • *AoC respiratory failure with hypoxia and hypercapnia [J96.21, J96.22]    • JORGITO [N17.9]    • RA (rheumatoid arthritis) [M06.9]    • On chronic immunosuppression therapy [D84.9]    • Stage 4 very severe COPD by GOLD classification [J44.9]    •  Bronchiectasis [J47.9]    • GERD [K21.9]    • Pulmonary nodule [R91.1]    • Chronic respiratory failure with hypoxia (HCC) [J96.11]    • Former smoker [Z87.892]      Past Surgical History:   Procedure Laterality Date   • BREAST BIOPSY Right 2015    X 3 BX's   • BREAST LUMPECTOMY         Allergies   Allergen Reactions   • Cephalosporins Unknown - Low Severity     No current facility-administered medications on file prior to encounter.     Current Outpatient Medications on File Prior to Encounter   Medication Sig   • alendronate (FOSAMAX) 70 MG tablet TAKE 1 TABLET BY MOUTH ONE TIME A WEEK   • Allegra-D Allergy & Congestion  MG per 12 hr tablet Take 1 tablet by mouth Daily As Needed.   • ascorbic acid (VITAMIN C) 100 MG tablet Take 100 mg by mouth Daily.   • budesonide (Pulmicort) 0.5 MG/2ML nebulizer solution Take 2 mL by nebulization 2 (Two) Times a Day.   • calcium carbonate-cholecalciferol 500-400 MG-UNIT tablet tablet Take 1 tablet by mouth Daily.   • esomeprazole (nexIUM) 40 MG capsule Take 40 mg by mouth Daily.   • FERROUS GLUCONATE IRON PO Take 225 mg by mouth 2 (Two) Times a Day.   • folic acid (FOLVITE) 1 MG tablet Take 1 mg by mouth Daily.   • formoterol (Perforomist) 20 MCG/2ML nebulizer solution Take 2 mL by nebulization 2 (Two) Times a Day.   • hydroxychloroquine (PLAQUENIL) 200 MG tablet Take 200 mg by mouth Daily.   • methotrexate 2.5 MG tablet Take 2.5 mg by mouth 1 (One) Time Per Week.   • metoprolol succinate XL (TOPROL-XL) 50 MG 24 hr tablet Take 50 mg by mouth Daily.   • multivitamin with minerals tablet tablet Take 1 tablet by mouth Daily.   • oxybutynin (DITROPAN) 5 MG tablet Take 5 mg by mouth 2 (Two) Times a Day.   • predniSONE (DELTASONE) 10 MG tablet Take 10 mg by mouth Daily.   • revefenacin (Yupelri) 175 MCG/3ML nebulizer solution Take 3 mL by nebulization Daily.   • sertraline (ZOLOFT) 100 MG tablet Take 100 mg by mouth Daily.   • tamoxifen (NOLVADEX) 20 MG chemo tablet Take  by  "mouth Daily.   • tiotropium bromide monohydrate (Spiriva Respimat) 2.5 MCG/ACT aerosol solution inhaler Inhale 2 puffs Daily.     MEDICATION LIST AND ALLERGIES REVIEWED.    Family History   Problem Relation Age of Onset   • Aortic stenosis Mother    • Melanoma Father    • Ovarian cancer Sister    • Breast cancer Sister    • Rectal cancer Sister    • Breast cancer Maternal Grandmother    • Breast cancer Daughter      Social History     Tobacco Use   • Smoking status: Former     Packs/day: 1.00     Years: 40.00     Pack years: 40.00     Types: Cigarettes     Quit date:      Years since quittin.2   • Smokeless tobacco: Never   Vaping Use   • Vaping Use: Never used   Substance Use Topics   • Alcohol use: Never   • Drug use: Never     Social History     Social History Narrative   • Not on file     FAMILY AND SOCIAL HISTORY REVIEWED.    Review of Systems   Unable to perform ROS as pt quite lethargic and on Bipap    Physical Exam and Clinical Information   BP 97/42 (BP Location: Left arm, Patient Position: Sitting)   Pulse 114   Temp 99 °F (37.2 °C) (Axillary)   Resp 24   Ht 167.6 cm (66\")   Wt 59 kg (130 lb)   SpO2 100%   BMI 20.98 kg/m²   Physical Exam  General Appearance: Sleepy lethargic on BiPAP, wakes up to verbal generalized weak  Head:   Pupils reactive & symmetrical B/L.  Neck:   Supple.   Lungs:   B/L Breath sounds present with decreased breath sounds on bases, no wheezing heard, right side crackles.   Heart: S1 and S2 present, no murmur  Abdomen: Soft, nontender, no guarding or rigidity, bowel sounds positive.  Extremities:  no cyanosis or clubbing,  no edema, warm to touch.  Pulses: Positive and symmetric.  Neurologic:  Moving all four extremities. Generalized weak. Not able to participate in full neurological exam  Psychological: lethargic    Results from last 7 days   Lab Units 23  2129   WBC 10*3/mm3 27.74*   HEMOGLOBIN g/dL 8.6*   PLATELETS 10*3/mm3 251     Results from last 7 days "   Lab Units 03/21/23  2129   SODIUM mmol/L 145   POTASSIUM mmol/L 3.1*   CO2 mmol/L >50.0*   BUN mg/dL 24*   CREATININE mg/dL 1.23*   GLUCOSE mg/dL 164*     Estimated Creatinine Clearance: 39.6 mL/min (A) (by C-G formula based on SCr of 1.23 mg/dL (H)).      Results from last 7 days   Lab Units 03/21/23  2152   PH, ARTERIAL pH units 7.376   PCO2, ARTERIAL mm Hg 93.2*   PO2 ART mm Hg 173.0*     Lab Results   Component Value Date    LACTATE 2.5 (C) 03/21/2023        Images: CXR reviewed and showed consolidative patchy infiltrates in the right lung field.  There are some opacities in the left lingula as well.  No pneumothorax, no pleural effusion.    I reviewed the patient's results and images.     CT chest pending.       Impression     AoC respiratory failure with hypoxia and hypercapnia    JORGITO    Stage 4 very severe COPD by GOLD classification    Bronchiectasis    RA (rheumatoid arthritis)    On chronic immunosuppression therapy    Pulmonary nodule    GERD    Plan/Recommendations     72-year-old female has past medical history of smoking with very severe COPD with FEV1 of 23% and 0.56 L,  significant bronchiectasis, 2-3 L supplemental oxygen at home chronically, rheumatoid arthritis on chronic prednisone therapy, methotrexate and Plaquenil, previous history of right breast cancer status postlumpectomy with adjuvant chemo and XRT and GERD.  Patient lives with her son.  History is obtained from patient's son who states that patient has been feeling tired for 2 days.  He has been getting progressively groggy and lethargic.  States that generally she hacks and coughs but has not been doing that for past couple of days.  No fevers.  No other sick contacts.  She ate her breakfast earlier today morning but when patient's son came back from work he found her slumped over.  Oxygen saturations were in the 60% range on 3 L nasal cannula oxygen.  Patient apparently also had episode of stool incontinence.  EMS was called and  patient was brought into ER on nonrebreather mask.  In the ER patient underwent further work-up and blood gas showed severe chronic hypercapnia with PCO2 of 93 but pH of 7.37.  Negative troponin, mildly elevated proBNP level, BUN of 24, mild lactic acidosis of 2.5, WBC count of 27k.  Chest x-ray suggestive of right-sided pneumonia.  Due to increased work of breathing patient was placed on BiPAP and seems to be tolerating that well.  Received 2 L of fluid boluses and despite that borderline hypotensive so we were asked to admit patient to intensive care unit.  Patient on BiPAP, tolerating okay.  Wakes up to verbal command and following some simple commands but remains quite lethargic.    1.  Admit to intensive care unit with severe hypoxemic and hypercapnic respiratory failure and hypotension with likely ongoing sepsis from pneumonia   2.  Respiratory viral panel negative.  Will send urine Legionella antigen as well as strep antigen.  Check MRSA screen.  No recent hospitalization.  No antibiotic use but patient is immune compromised with rheumatoid arthritis and on immunosuppressive drugs.  Will cover broadly with vancomycin and Zosyn for now.  Aspiration is another possibility and should be covered with current antibiotics.  Blood cultures have been obtained.  Urinalysis is also concerning for UTI and should be covered with Zosyn therapy as well.  Will adjust antibiotics based on culture data.  3.  DuoNeb nebulizers every 4 hours.  Pulmicort nebulizer twice a day.  We will increase her steroid dosing to Solu-Medrol 20 mg every 12 for now and monitor status closely.  4.  Has received 2 L of fluid boluses.  We will continue normal saline at 75 mL/h.  Monitor urine output and electrolytes closely.  Monitor renal function closely.  Replace electrolytes per ICU protocol.  Blood pressure is borderline.  We will continue IV fluids.  Use boluses as needed.  BNP is elevated which could be secondary to underlying pulmonary  hypertension.  5.  CTA chest and CT head pending and we will follow-up on those results.  6.  Needs med reconciliation.  7.  GI prophylaxis and DVT prophylaxis.  8.  Patient is anemic and hemoglobin slightly worse compared to September and currently at 8.6.  No acute bleed noted.  MCV is elevated.  Will check vitamin B12 level and iron studies.  9.  Check thyroid studies.  10.  Get EKG.  Echocardiogram in the morning.  11.  N.p.o. for now.    Labs in the morning.    Met with patient's son and updated him about current condition and overall guarded prognosis.  He verbalized understanding.  Discussed that if patient gets any further worse then may need to be intubated and mechanically ventilated.  He wants to keep patient full code as they have not had discussions in the past regarding CODE STATUS.  Discussed that if she gets on mechanical ventilation then due to her underlying severe lung disease it will very difficult to get her off  the ventilator.  He was able to understand that.  We will continue ongoing discussions regarding goals of care.    Time spent Critical care 40 min (exclusive of procedure time)  including high complexity decision making to assess, manipulate, and support vital organ system failure in this individual who has impairment of one or more vital organ systems such that there is a high probability of imminent or life threatening deterioration in the patient’s condition.    Justice Kraft MD, [facility]  Pulmonary and Critical Care Medicine     CC: Bala Gomez MD    Electronically signed by Justice Kraft MD at 03/22/23 0003

## 2023-03-31 NOTE — PROGRESS NOTES
[facility]  Inpatient Progress Note      Chief Complaint/Reason for consult: PAF         Subjective  More time spent in afib since yesterday afternoon despite amio bolus, less interactive with me today       Vital Sign Min/Max for last 24 hours  Temp  Min: 96.4 °F (35.8 °C)  Max: 98.1 °F (36.7 °C)   BP  Min: 109/63  Max: 138/73   Pulse  Min: 70  Max: 144   Resp  Min: 20  Max: 35   SpO2  Min: 87 %  Max: 100 %   Flow (L/min)  Min: 4  Max: 6      Intake/Output Summary (Last 24 hours) at 3/31/2023 1327  Last data filed at 3/31/2023 0617  Gross per 24 hour   Intake 2912 ml   Output 700 ml   Net 2212 ml           Gen: ill appearing white female, lying in bed, somnolent comfortable appearing  HEENT: MMM, sclerae anicteric, conjunctivae normal, NGT in place  CV: tachycardia, regular rhythm, III/VI systolic murmur at RUSB and base, normal S1, S2. 2+ radial pulses  Pulm: BiPAP oxygen, no wheezes or rhonchi  Abd: soft, nontender, nondistended  Ext: normal bulk for age, normal tone, no dependent edema  Neuro: Awake and alert, face symmetrical, upper extremities in restraints     Tele:  PAF with longer episodes, now appears to be in ST vs. Atrial flutter    Results Review (reviewed the patient's recent labs in the electronic medical record):     EKG:  3/27 - NSR, normal ECG, normal QT/QTc     Radiology:   No radiology results for the last day  3/23/23 - Transthoracic Echo Complete   •  Left ventricular ejection fraction appears to be 51 - 55%.  •  Left ventricular diastolic function is consistent with (grade I) impaired relaxation.  •  Moderate tricuspid valve regurgitation is present. Estimated right ventricular systolic pressure from tricuspid regurgitation is markedly elevated (>55 mmHg).  •  The hepatic veins are dilated consistent with elevated right atrial pressure.  •  There is moderate thickening of the aortic valve. The aortic valve appears trileaflet. Trace aortic valve regurgitation is  present. Mild aortic valve stenosis is present.     Lab Results   Component Value Date    WBC 10.85 (H) 03/29/2023    HGB 8.9 (L) 03/29/2023    HCT 33.1 (L) 03/29/2023    .4 (H) 03/29/2023     (L) 03/29/2023     Lab Results   Component Value Date    GLUCOSE 134 (H) 03/29/2023    CALCIUM 8.9 03/29/2023     (H) 03/29/2023    K 4.5 03/29/2023    CO2 34.0 (H) 03/29/2023     03/29/2023    BUN 36 (H) 03/29/2023    CREATININE 0.86 03/29/2023    BCR 41.9 (H) 03/29/2023    ANIONGAP 6.0 03/29/2023     Assessment     Paroxysmal atrial fibrillation   - continue IV amiodarone for now   - continue metoprolol   - no AC with anemia and potential bleeding    Palliative care following, ongoing discussions with patient's family regarding treatment goals.     MAMTA Gomez MD  3/31/2023  13:27 EDT

## 2023-03-31 NOTE — PROGRESS NOTES
Daily chart review complete.  Documentation of patient with increased O2 needs and HR overnight.  She remains on Keofeed with palliative care following for support in decision making.  BS visit deferred this date. No other questions or concerns at this time.  NN continues to follow.    Per current GOLD Standards, please consider:  LAMA in place (Spiriva Respimat), No LABA/ICS in place, Outpatient PFT, Rehab as appropriate, Palliative Care consult,  Annual LDCT per current screening guidelines (age 50-80 years old, smoking history of 20 pack years or more or has quit within past 15 years)      Patient has been scheduled for a hospital follow up with [facility] for 4/19/2023 @ 2:30 pm with DINO Mendiola.

## 2023-03-31 NOTE — CONSULTS
Clinical Nutrition     Nutrition Support Assessment  Reason for Visit: Follow-up protocol, EN      Patient Name: Sharmila Delarosa  YOB: 1950  MRN: [id number]  Date of Encounter: 03/31/23 10:20 EDT  Admission date: 3/21/2023    Comments:     Tolerating EN at goal - FMS remains in place, stool looks loose but not watery.     [facility] today with family - noted inpatient hospice consult.     Continue EN at current rate/goal - will readjust as appropriate pending outcome of [facility]    Nutrition Assessment   Admission Diagnosis:  Acute respiratory failure with hypoxia and hypercarbia (HCC) [J96.01, J96.02]      Problem List:    [facility] respiratory failure with hypoxia and hypercapnia    Stage 4 very severe COPD by GOLD classification    Bronchiectasis    Pulmonary nodule    RA (rheumatoid arthritis)    On chronic immunosuppression therapy    GERD    UTI (urinary tract infection)    Acute respiratory failure with hypoxia and hypercarbia (HCC)    Anemia    Atrial fibrillation with RVR ([facility])    Dysphagia    Pneumonia of right upper lobe due to infectious organism        PMH:  She  has a past medical history of Anxiety, Breast cancer ([facility]) (2015), Depression, Drug therapy, GERD (3/21/2023), radiation therapy, On chronic immunosuppression therapy (3/21/2023), and RA (rheumatoid arthritis) (3/21/2023).    PSH: She  has a past surgical history that includes Breast lumpectomy and Breast biopsy (Right, 2015).      Applicable Nutrition Concerns:   Skin:  Oral:  GI:      Applicable Interval History:   (3/23) SLP eval - NPO  (3/24) SLP FEES - soft textures/chopped meats/nectar thick liquids;  Small bore NG tube placed for EN  Per [facility] at [facility]  (3/28) SLP rec NPO - for comfort consider 3 3oz snacks. Full comfort diet soft or pureed  EN initiated    Reported/Observed/Food/Nutrition Related History:     3/31  FMS in place - 300mL OP noted, observed ~500mL at bedside. Continued need for BiPap - unable to discuss EN  tolerance with pt. GOC today @ 2pm with Palliative.       3/29  Diarrhea better. Transitioned to Peptamen 1.5 Pt now DNR/DNI    3/27 RN reports pt has not stopped having diarrhea since tube feeding started, NP held it at current rate of 50 ml/hr. Pt is till requiring BiAP does have periods of tolerating HFNC, amio restarted by cardiology. SLP to see for FEES.    RN reports patient had PO diet initiated yesterday, but whenever pt takes a bit or sip of something she coughs and her oxygen sats decrease. Pt to be NPO for now. A small bore feeding tube was placed yesterday and EN will be initiated today (pt in agreement per palliative care notes). Note NKFA.    Pt has been requiring both Bipap and HFNC. RD recommended feeding tube be advanced to post-pyloric given use of Bipap. If unable to advance, will need to hold EN during Bipap use.      Labs    Labs Reviewed: Yes elevated NA+ noted    Results from last 7 days   Lab Units 03/29/23  0200 03/28/23  0527 03/27/23  0423   GLUCOSE mg/dL 134* 220* 416*   BUN mg/dL 36* 40* 42*   CREATININE mg/dL 0.86 0.86 1.02*   SODIUM mmol/L 147* 148* 142   CHLORIDE mmol/L 107 108* 100   POTASSIUM mmol/L 4.5 4.8 4.5   PHOSPHORUS mg/dL 2.5 2.5 2.0*  2.0*   MAGNESIUM mg/dL 2.4 1.9 2.2  2.2   ALT (SGPT) U/L  --   --  15       Results from last 7 days   Lab Units 03/28/23  0527 03/27/23  0423 03/26/23  0811   ALBUMIN g/dL  --  2.9*  --    IONIZED CALCIUM mmol/L 1.32 1.17 1.09*   CHOLESTEROL mg/dL  --  148  --    TRIGLYCERIDES mg/dL  --  267*  --        Results from last 7 days   Lab Units 03/31/23  0558 03/31/23  0015 03/30/23  1153 03/30/23  0548 03/29/23  2324 03/29/23  1718   GLUCOSE mg/dL 217* 209* 201* 134* 189* 147*     No results found for: HGBA1C            Medications    Medications Reviewed: Yes  Pertinent: insulin, steroid, protonix  Infusion:   PRN:     Intake/Ouptut 24 hrs (0701 - 0700)   I&O's Reviewed: Yes   Intake & Output (last day)       03/30 0701 03/31 0700 03/31  "0701  04/01 0700    Other 1611     NG/GT 1451     Total Intake(mL/kg) 3062 (45.6)     Urine (mL/kg/hr) 700 (0.4)     Stool      Total Output 700     Net +2362           Urine Unmeasured Occurrence 2 x           Anthropometrics     Admission Height 167.6 cm (66\") Documented at 03/21/2023 2138   Admission Weight 59 kg (130 lb) Documented at 03/21/2023 2138          Height: Height: 167.6 cm (66\")  Last Filed Weight: Weight: 67.1 kg (148 lb) (03/31/23 0600)  Method: Weight Method: Bed scale  BMI: BMI (Calculated): 23.9  BMI classification: Normal: 18.5-24.9kg/m2    Weight Weight (kg) Weight (lbs) Weight Method VISIT REPORT   3/26/2023 64 kg 141 lb 1.5 oz Bed scale -   3/25/2023 63.7 kg 140 lb 6.9 oz Bed scale -   3/24/2023 63.2 kg 139 lb 5.3 oz Bed scale -   3/24/2023 61.2 kg 134 lb 14.7 oz Bed scale -   3/23/2023 61 kg 134 lb 7.7 oz Bed scale -   3/23/2023 59.421 kg 131 lb - -   3/23/2023 59.7 kg 131 lb 9.8 oz Bed scale -   3/21/2023 58.968 kg 130 lb Estimated -     UBW:  Weight Change:  Nutrition Focused Physical Exam     Date:   Unable to perform exam due to: Potential for patient discomfort, Defer pending indication, d/t respiratory status *     Needs Assessment   Date: 3/25, 3/27    Height used: 167.6 cm  Weights used: 64 kg      Estimated Calorie needs: ~ 1700 calories daily  Method: 12-25 Kcals/KG  For critical illness = 768-1600 kcal/kg  Method:  MSJ = 1177 x 1.25 = 1471    Estimated Protein needs: ~83 g protein daily (1.3 gm/kg)  Method: 1.2-1.5 g/Kg =  64-96 kg    Estimated Fluid needs:  To correct hypernatremia    Current Nutrition Prescription     PO: NPO Diet NPO Type: Tube Feeding    Intake: Insufficient data NPO since 3/22    EN: Peptamen 1.5  Goal Rate: 50 ml/hr  Water Flushes: 50 ml/hr  Modular: None  Route: ND  Tube: Small bore    At goal over: 20Hrs/day    Rx will supply w [employer]  Goal Volume 1000 mL/day     Flush Volume 1000 mL/day     Energy 1500 Kcal/day 88 % Est Need   Protein 68 g/day 82 % Est " Need   Fiber 0 g/day     Water in   mL     Total Water 1770 mL     Meet DRI Yes        --------------------------------------------------------------------------  Product/Rate verified at bedside: Yes  Infusing Rate at time of visit: Peptamen 1.5  50 ml/hr Water at 50 ml/hr.    Average Delivery from [employer] Days: ? When pump was cleared  Volume 1299 mL/day   % Goal Vol.   Flush Volume 1406 mL/day     Energy 1949 Kcal/day 115 % Est Need   Protein 88 g/day 106 % Est Need   Fiber 0 g/day     Water in  EN 1000 mL     Total Water 2406 mL     Meet DRI Yes          Nutrition Diagnosis     Date: 3/27 Updated: 3/29, 3/31  Problem Inadequate energy intake/protein intake   Etiology A/C Resp failure/ stage 4 COPD   Signs/Symptoms HFNC/BiPAP/NPO , failed FEES   Status: ongoing EN support in place; resolved with EN    Goal:   General: Nutrition support treatment, Palliative care  PO: Advace diet as medically feasible/appropriate  EN/PN: Maintain EN    Nutrition Intervention      Follow treatment progress, Care plan reviewed, Nutrition support order placed     Continue EN at current rate/goal - will readjust as appropriate pending outcome of GOC    Will consider adding fiber if stool does not slow with change to EN formula.      Monitoring/Evaluation:   Per protocol, I&O, Pertinent labs, EN delivery/tolerance, Weight, Symptoms, POC/GOC, Swallow function      Ginger Sandoval, MS,RD,LD  Time Spent: 35 min

## 2023-03-31 NOTE — PROGRESS NOTES
Continued Stay Note  [facility]     Patient Name: Sharmila Delarosa  MRN: [id number]  Today's Date: 3/31/2023    Admit Date: 3/31/2023    Plan: Inpatient hospice   Discharge Plan     Row Name 03/31/23 1746       Plan    Plan Inpatient hospice    Plan Comments Admitted MP, a 71yo female to inpatient services this day.  Present for visit are patient and son. 10%pps. Patient noted unresponsive with relaxed face, jaw, body posture, respirations. She is mottled at knees, toes, feet. Dusky, pallor. Bipap is present and intact. RN actively listened as son relays journey to this point and comfort focused goals of care with quality of life emphasis.  Validation, support and reflective communication provided. Discussed hospice, assessment, condition, eol changes, eol signs/symptoms, food and water, medications, what to possibly expect from this point forward, decline, and self care. Plan is to transition from bipap to 6L/nc after premedication administered. Ongoing support, open communication offered and provided.  Noted son with verbalization of understanding. Patient requires inpatient hospice due to necessity of injectable medications for symptom palliation requiring frequent skilled nursing assessment and intervention. Disposition dependent on survival of hospitalization and patient becoming appropriate for a lower level of care setting.  Care coordinated with Dane RANDLE. Updated Dr. Vega.    Final Discharge Disposition Code 51 - hospice medical facility               Discharge Codes    No documentation.                     Katja Ramirez RN

## 2023-03-31 NOTE — PROGRESS NOTES
"Palliative Care Daily Progress Note     C/C: Patient does not respond to verbal stimuli.     S: Medical record reviewed. Follow up visit for GOC in the context of complex medical decision making. Events noted. Patient on BiPAP at time of visit. Patient does not respond to verbal stimuli. Patient with increased HR overnight and requiring BiPAP, appears comfortable at time of visit. RR 24-26 at time of visit.     ROS: Unable to complete full ROS secondary to sleeping and lethargy.      O: Code Status:   Code Status and Medical Interventions:   Ordered at: 03/24/23 1156     Medical Intervention Limits:    NO intubation (DNI)     Level Of Support Discussed With:    Patient     Code Status (Patient has no pulse and is not breathing):    No CPR (Do Not Attempt to Resuscitate)     Medical Interventions (Patient has pulse or is breathing):    Limited Support     Release to patient:    Routine Release      Advanced Directives: Advance Directive Status: Patient has advance directive, copy in chart   Goals of Care: Ongoing.   Palliative Performance Scale Score: 30%     /63 (BP Location: Left arm, Patient Position: Lying)   Pulse 113   Temp 97.8 °F (36.6 °C) (Axillary)   Resp 26   Ht 167.6 cm (66\")   Wt 67.1 kg (148 lb)   SpO2 98%   BMI 23.89 kg/m²     Intake/Output Summary (Last 24 hours) at 3/31/2023 1047  Last data filed at 3/31/2023 0617  Gross per 24 hour   Intake 2912 ml   Output 700 ml   Net 2212 ml       PE:  General Appearance:    Patient laying in bed, A/C ill appearing, frail,    HEENT:    NC/AT,  MM dry, BiPAP in place, keofeed in place   Neck:   supple, trachea midline, no JVD   Lungs:      diminished in bases; WOB increased at rest; RR 24-26 on exam, BiPAP in place    Heart:    RRR, normal S1 and S2, no M/R/G,  on monitor   Abdomen:     Normal bowel sounds, soft, nontender, nondistended   G/U:   Deferred   MSK/Extremities:   Wasting, no edema   Pulses:   Pulses palpable and equal bilaterally "   Skin:   Warm, dry   Neurologic:   does not respond to verbal stimuli,     Psych:   \does not respond to verbal stimuli       Meds: Reviewed and changes noted    Labs:   Results from last 7 days   Lab Units 03/29/23  0134   WBC 10*3/mm3 10.85*   HEMOGLOBIN g/dL 8.9*   HEMATOCRIT % 33.1*   PLATELETS 10*3/mm3 116*     Results from last 7 days   Lab Units 03/29/23  0200   SODIUM mmol/L 147*   POTASSIUM mmol/L 4.5   CHLORIDE mmol/L 107   CO2 mmol/L 34.0*   BUN mg/dL 36*   CREATININE mg/dL 0.86   GLUCOSE mg/dL 134*   CALCIUM mg/dL 8.9     Results from last 7 days   Lab Units 03/29/23  0200 03/28/23  0527 03/27/23  0423   SODIUM mmol/L 147*   < > 142   POTASSIUM mmol/L 4.5   < > 4.5   CHLORIDE mmol/L 107   < > 100   CO2 mmol/L 34.0*   < > 33.0*   BUN mg/dL 36*   < > 42*   CREATININE mg/dL 0.86   < > 1.02*   CALCIUM mg/dL 8.9   < > 7.9*   BILIRUBIN mg/dL  --   --  0.2   ALK PHOS U/L  --   --  43   ALT (SGPT) U/L  --   --  15   AST (SGOT) U/L  --   --  12   GLUCOSE mg/dL 134*   < > 416*    < > = values in this interval not displayed.     Imaging Results (Last 72 Hours)     Procedure Component Value Units Date/Time    SLP FEES - Fiberoptic Endo Eval Swallow [[id number]] Resulted: 03/28/23 1130     Updated: 03/28/23 1130    Narrative:      This procedure was auto-finalized with no dictation required.                Diagnostics: Reviewed    A:   AoC respiratory failure with hypoxia and hypercapnia    Stage 4 very severe COPD by GOLD classification    Bronchiectasis    Pulmonary nodule    RA (rheumatoid arthritis)    On chronic immunosuppression therapy    GERD    UTI (urinary tract infection)    Acute respiratory failure with hypoxia and hypercarbia (HCC)    Anemia    Atrial fibrillation with RVR (HCC)    Dysphagia    Pneumonia of right upper lobe due to infectious organism     72 y.o. female with COPD, bronchiectasis, JORGITO, GERD, PNA, UTI.      S/S:   1. Dyspnea -currently on BiPAP, wean off BiPAP to NC with comfort  measures  -continue nebs  -started Morphine 2mg IV q 1 hour prn dyspnea  -started Robinul 0.4mg IV q 4 hours prn excess secretions    2. Anxiety/Agitation -started Lorazepam 0.5mg IV q 4 hours prn agitation     3. Dysphagia -stopped TF, discontinue keofeed,   -ordered Palliative oral rinse     4. Debility -recommend PT/OT evaluation if respiratory status allows     5. GOC -DNR/DNI/Comfort Measures -per discussion with son and daughter  -plan to meet with son Christian at bedside at 2pm to discuss patient's condition with Dr. Gomez, patient's PCP in honor of patient's LW  -met with son, Dr. Gomez on speaker phone, and daughter on speaker phone  -family elects comfort measures with hospice consult placed    P: Called and spoke with madhu Gonzales, plan to meet at 2pm at bedside.  Met with Christian at bedside, Dr. Gomez on speaker phone, and patient's daughter on speaker phone. Appreciative of Dr. Gomez's time and discussion. Son and daughter elect comfort focused plan of care after discussion. Discussed comfort measures with discontinuation of BiPAP, IV fluids, amiodarone, TF, labs. Discussed use of medications for symptom management. Family in agreement. Hospice consult placed. RN updated on GOC. All questions and concerns addressed.   Palliative Care Team will continue to follow patient. Please do not hesitate to contact us regarding further sx mgmt or GOC needs.  Makayla Roper, APRN  3/31/2023  Time spent: 45 minutes

## 2023-03-31 NOTE — PLAN OF CARE
Goal Outcome Evaluation:  Plan of Care Reviewed With: (P) patient        Progress: (P) no change  Outcome Evaluation: (P) Patient heart rate between  early in shift. Consistently 130 bpm. EKG done, cardiologist paged, notified of HR, history, and BP. %mg IV metoprolol ordered for HR greater than 130 with repeat dose once in 15 minutes. Both dose given per order, HR remained in 130s throughout the night. Patient on BiPAP for several hours. Removed by patient, noted desaturation to 64% RA, placed on 4L NC with slow recovery into mid 80s. Oxygen increased to 6L NC. Provider notified of continued HR, increased oxygen, and continued tachypnea. No new orders recieved. Patient turned in bed, rectal tube in place.

## 2023-03-31 NOTE — DISCHARGE SUMMARY
[facility] Medicine Services  DISCHARGE TO INPATIENT HOSPICE    Patient Name: Sharmila Delarosa  : 1950  MRN: [id number]    Date of Admission: 3/21/2023  Date of Discharge:  23  Primary Care Physician: Bala Gomez MD    Consults     Date and Time Order Name Status Description    3/26/2023  8:57 AM Inpatient Cardiology Consult Completed     3/23/2023 12:34 AM Inpatient Palliative Care MD Consult Completed         Hospital Course     Presenting Problem:   Acute respiratory failure with hypoxia and hypercarbia (HCC) [J96.01, J96.02]    Active Hospital Problems    Diagnosis  POA   • **AoC respiratory failure with hypoxia and hypercapnia [J96.21, J96.22]  Yes   • Pneumonia of right upper lobe due to infectious organism [J18.9]  Yes   • Atrial fibrillation with RVR (HCC) [I48.91]  Yes   • Dysphagia [R13.10]  Yes   • Anemia [D64.9]  Yes   • Acute respiratory failure with hypoxia and hypercarbia (HCC) [J96.01, J96.02]  Yes   • RA (rheumatoid arthritis) [M06.9]  Yes   • On chronic immunosuppression therapy [D84.9]  Yes   • GERD [K21.9]  Yes   • UTI (urinary tract infection) [N39.0]  Yes   • Stage 4 very severe COPD by GOLD classification [J44.9]  Yes   • Pulmonary nodule [R91.1]  Yes   • Bronchiectasis [J47.9]  Yes      Resolved Hospital Problems    Diagnosis Date Resolved POA   • JORGITO [N17.9] 2023 Yes          Hospital Course:  Sharmila Delarosa is a 72 y.o. female with hx of COPD on 2 liters at home, former tobacco abuse, bronchiectasis, LLL pulmonary nodule, GERD, rheumatoid arthritis, and breast cancer who was admitted to [facility] ICU on 3/21/23 due to acute on chronic respiratory failure. CTA chest on admission showed a RUL PNA as well as a more lobular pleural based mass in the inferior aspect of the RUL measuring 4 x 3.2 cm, could be consolidative PNA vs soft tissue neoplasm; areas of mucous plugging noted in the RLL as well as bronchiectasis and bronchiolitis; miliary type  nodularity throughout both lungs most likley representing infectious bronchiolitis vs atypical infection/fungal infection. She was treated with IV steroids and ATBx (Vanc/Zosyn), BiPAP and HFNC. Has not made any improvements in her O2 requirements. Initially passed her swallow evaluation but then after nectar thick liquids were given, she had a suspected aspiration event with desaturation requiring BiPAP to recover; had another episode of desaturation the next day with repeated food trial. She then went into Afib with RVR on 3/25/23. Placed on Amiodarone drip. Cardiology has been following. Palliative Care was consulted on 3/23/23 and continues to follow for goals of care discussion. Transferred to telemetry and hospitalist service assumed care on 3/31/23.      Acute on chronic respiratory failure with hypoxia and hypercapnia   Stage IV COPD  Bronchiectasis with exacerbation  Aspiration PNA  --Was on Vanc/Zosyn initially, deescalated to Zosyn only to complete course  --Negative blood cultures, urinary antigens, negative MRSA PCR  --s/p IV steroids, transitioned to PO prednisone  --Remains on BiPAP for the most part, unable to tolerate 6 liters this morning with desaturations into the low 80's  --Continued nebs and percussion vest as recommended by Pulmonary     Dysphagia with recurrent aspiration  --SLP following  --NPO  --On tube feeds, Nutrition following     Afib with RVR  --Cardiology following  --Remains on IV Amiodarone  --In setting of anemia, no anticoagulation recommended     Diarrhea  --Started with TF's  --Afebrile  --Rectal tube in place to help manage     Sepsis, POA  UTI, POA  --s/p course of Zosyn, no urine culture had been sent on admission     JORGITO  --Resolved with IV fluids     Rheumatoid arthritis  --On chronic prednisone, Plaquenil     Anemia  --Folate, B12 normal  --FOBT negative  --s/p 2 units PRBC on 3/23/23     Pulmonary nodule, RUL  --Seen on CTA chest  --Outpatient follow up if desired by  patient/family     Goals of care  --Palliative Care following, despite aggressive treatment she remains dependent on BiPAP  --She is DNR/DNI  --She is being admitted to inpatient hospice service today       Day of Discharge     HPI:   Patient seen this morning, on BiPAP. Keeps eyes closed but able to answer simple questions. BiPAP removed so I could talk to her, and she desaturated to 80% on 6 liters. Remains tachycardic.      ROS:  Currently denies chest pain, SOA, cough    Vital Signs:   Temp:  [96.4 °F (35.8 °C)-98.1 °F (36.7 °C)] 97.8 °F (36.6 °C)  Heart Rate:  [] 117  Resp:  [20-35] 26  BP: (109-138)/(57-80) 109/63     Physical Exam:  Gen-no acute distress, frail appearance   CV-tachycardic, S1 S2 normal, no m/r/g  Resp-coarse breath sounds bilaterally, no wheezes, on BiPAP  Abd-soft, NT, ND, +BS  Ext-trace BLE edema  Neuro-keeps eyes closed but able to answer most questions appropriately, no focal deficits    Discharge Details     Discharge Disposition:  Transfer care to inpatient Hospice at [facility]    Time Spent on Discharge:  60 minutes     Beth Saravia MD  03/31/23

## 2023-04-01 NOTE — H&P
Bala Gomez MD      HPI: ***        Past Medical History:   Diagnosis Date   • Anxiety    • Breast cancer (HCC)     Right breast    • Depression    • Drug therapy    • GERD 3/21/2023   • Hx of radiation therapy    • On chronic immunosuppression therapy 3/21/2023   • RA (rheumatoid arthritis) 3/21/2023     Past Surgical History:   Procedure Laterality Date   • BREAST BIOPSY Right 2015    X 3 BX's   • BREAST LUMPECTOMY       Current Code Status     Date Active Code Status Order ID Comments User Context       3/31/2023 1609 No CPR (Do Not Attempt to Resuscitate) [id number]  Katja Ramirez RN Inpatient      Question Answer    Code Status (Patient has no pulse and is not breathing) No CPR (Do Not Attempt to Resuscitate)    Medical Interventions (Patient has pulse or is breathing) Comfort Measures    Level Of Support Discussed With Health Care Surrogate    Release to patient Routine Release              Scheduled Meds:LORazepam, 0.5 mg, Intravenous, Q4H  Morphine, 4 mg, Intravenous, Q4H  palliative care oral rinse, , Mouth/Throat, 4x Daily    PRN Meds:.•  [DISCONTINUED] acetaminophen **OR** [DISCONTINUED] acetaminophen **OR** acetaminophen  •  bisacodyl  •  glycopyrrolate  •  ketorolac  •  LORazepam  •  Morphine  •  [DISCONTINUED] ondansetron **OR** ondansetron  •  palliative care oral rinse  •  polyvinyl alcohol  •  Scopolamine  •  sodium chloride       Allergies   Allergen Reactions   • Cephalosporins Unknown - Low Severity     Family History   Problem Relation Age of Onset   • Aortic stenosis Mother    • Melanoma Father    • Ovarian cancer Sister    • Breast cancer Sister    • Rectal cancer Sister    • Breast cancer Maternal Grandmother    • Breast cancer Daughter      Social History     Socioeconomic History   • Marital status:    Tobacco Use   • Smoking status: Former     Packs/day: 1.00     Years: 40.00     Pack years: 40.00     Types: Cigarettes     Quit date:      Years since quittin.2   •  Smokeless tobacco: Never   Vaping Use   • Vaping Use: Never used   Substance and Sexual Activity   • Alcohol use: Never   • Drug use: Never   • Sexual activity: Defer     Review of Systems - {ros master:027053}      BP (!) 74/49 (BP Location: Left arm, Patient Position: Lying)   Pulse 113   Temp 97.6 °F (36.4 °C) (Oral)   Resp 26   SpO2 97%     Intake/Output Summary (Last 24 hours) at 4/1/2023 0028  Last data filed at 3/31/2023 2125  Gross per 24 hour   Intake 952 ml   Output 650 ml   Net 302 ml     Physical Exam:    {General Physical Exam:64455}               Results from last 7 days   Lab Units 03/29/23  0134   WBC 10*3/mm3 10.85*   HEMOGLOBIN g/dL 8.9*   HEMATOCRIT % 33.1*   PLATELETS 10*3/mm3 116*     Results from last 7 days   Lab Units 03/29/23  0200 03/28/23  0527 03/27/23  0423   SODIUM mmol/L 147*   < > 142   POTASSIUM mmol/L 4.5   < > 4.5   CHLORIDE mmol/L 107   < > 100   CO2 mmol/L 34.0*   < > 33.0*   BUN mg/dL 36*   < > 42*   CREATININE mg/dL 0.86   < > 1.02*   CALCIUM mg/dL 8.9   < > 7.9*   BILIRUBIN mg/dL  --   --  0.2   ALK PHOS U/L  --   --  43   ALT (SGPT) U/L  --   --  15   AST (SGOT) U/L  --   --  12   GLUCOSE mg/dL 134*   < > 416*    < > = values in this interval not displayed.     Results from last 7 days   Lab Units 03/29/23  0200   SODIUM mmol/L 147*   POTASSIUM mmol/L 4.5   CHLORIDE mmol/L 107   CO2 mmol/L 34.0*   BUN mg/dL 36*   CREATININE mg/dL 0.86   GLUCOSE mg/dL 134*   CALCIUM mg/dL 8.9       Impression:         Plan:         Time: {Time spent:[id number]}      Keeley Vega MD  [facility]  Hospice and Palliative Care

## 2023-04-01 NOTE — SIGNIFICANT NOTE
Exam confirms with auscultation zero audible heart tones and zero audible respirations. Ms.Meridyth Delarosa was pronounced dead at 1130 4/1/23.  MD notified by Patient's RN.    Josemanuel Santos RN  Clinical House Supervisor  4/1/2023 12:48 EDT

## 2023-04-01 NOTE — DISCHARGE SUMMARY
Date of Admission: 03/31/2023   Date and Time of Death: 4/1/2023 at 11:30 AM    Hospital Course:  Sharmila Delarosa is a 72 y.o. female admitted to inpatient hospice with diagnosis of Respiratory failure [J96.90]  for symptom management. Medications were available throughout admission for symptom management and comfort. Patient continued to decline after admission as expected ultimately to their death on 4/1/2023 at 11:30 AM. Patient was pronounced dead by Clinical House Supervisor. Spiritual and psychosocial support were available to patient and family throughout admission. Patient's remains were released per [facility] protocol.    Stephanie Calvert, MSN, APRN  [facility]  Hospice and Palliative Care Nurse Practitioner  04/01/23  12:58 EDT

## 2023-04-01 NOTE — PROGRESS NOTES
Continued Stay Note  [facility]     Patient Name: Sharmila Delarosa  MRN: [id number]  Today's Date: 4/1/2023    Admit Date: 3/31/2023    Plan: IPU assessment   Discharge Plan     Row Name 04/01/23 1106       Plan    Plan IPU assessment    Plan Comments     4/1 PPS 10 % Patient is nonresponsive but appears peaceful. Remains on 6 L NC with shallow breathing. Mottling on feet and toes and cold. Upper body remains warm. Fecal management draining. Pur wick drained 650 cc. Pt will bite on swab. Pt actively dying. son notified by Sandra RANDLE. No PRNS needed.  Currently patient remains GIP for complications of EOL care requiring skilled nursing and medical management of symptoms. Discharge plan dependent on a decreased level of care and survival of this admission.                     Discharge Codes    No documentation.                     Radha Stoddard RN

## 2023-04-01 NOTE — PLAN OF CARE
Goal Outcome Evaluation:  Plan of Care Reviewed With: (P) patient        Progress: (P) no change  Outcome Evaluation: (P) patient appeared restful. No s/s of pain. Rectal tube remains in place. Low urine output this shift. Frequent oral care provided.

## 2023-04-13 LAB
QT INTERVAL: 270 MS
QTC INTERVAL: 419 MS